# Patient Record
Sex: FEMALE | Race: WHITE | NOT HISPANIC OR LATINO | Employment: OTHER | URBAN - METROPOLITAN AREA
[De-identification: names, ages, dates, MRNs, and addresses within clinical notes are randomized per-mention and may not be internally consistent; named-entity substitution may affect disease eponyms.]

---

## 2017-01-06 DIAGNOSIS — G56.03 CARPAL TUNNEL SYNDROME ON BOTH SIDES: ICD-10-CM

## 2017-01-08 RX ORDER — GABAPENTIN 300 MG/1
CAPSULE ORAL
Qty: 30 CAPSULE | Refills: 0 | OUTPATIENT
Start: 2017-01-08

## 2017-01-13 RX ORDER — FLUTICASONE PROPIONATE 50 MCG
SPRAY, SUSPENSION (ML) NASAL
Qty: 16 G | Refills: 12 | Status: SHIPPED | OUTPATIENT
Start: 2017-01-13 | End: 2018-05-08

## 2017-01-28 DIAGNOSIS — G56.03 CARPAL TUNNEL SYNDROME ON BOTH SIDES: ICD-10-CM

## 2017-01-28 RX ORDER — GABAPENTIN 300 MG/1
CAPSULE ORAL
Qty: 90 CAPSULE | Refills: 0 | Status: SHIPPED | OUTPATIENT
Start: 2017-01-28 | End: 2017-02-16 | Stop reason: SDUPTHER

## 2017-02-14 ENCOUNTER — OFFICE VISIT (OUTPATIENT)
Dept: FAMILY MEDICINE | Facility: CLINIC | Age: 32
End: 2017-02-14
Payer: COMMERCIAL

## 2017-02-14 VITALS
WEIGHT: 161.19 LBS | SYSTOLIC BLOOD PRESSURE: 110 MMHG | TEMPERATURE: 99 F | DIASTOLIC BLOOD PRESSURE: 86 MMHG | HEIGHT: 60 IN | BODY MASS INDEX: 31.64 KG/M2 | HEART RATE: 108 BPM

## 2017-02-14 DIAGNOSIS — G93.5 CHIARI MALFORMATION TYPE I: ICD-10-CM

## 2017-02-14 DIAGNOSIS — F98.8 ADD (ATTENTION DEFICIT DISORDER): Primary | ICD-10-CM

## 2017-02-14 DIAGNOSIS — G95.0 SYRINGOMYELIA: ICD-10-CM

## 2017-02-14 DIAGNOSIS — F34.1 DYSTHYMIC DISORDER: ICD-10-CM

## 2017-02-14 PROCEDURE — 99999 PR PBB SHADOW E&M-EST. PATIENT-LVL III: CPT | Mod: PBBFAC,,, | Performed by: FAMILY MEDICINE

## 2017-02-14 PROCEDURE — 99213 OFFICE O/P EST LOW 20 MIN: CPT | Mod: S$GLB,,, | Performed by: FAMILY MEDICINE

## 2017-02-14 RX ORDER — DEXTROAMPHETAMINE SACCHARATE, AMPHETAMINE ASPARTATE MONOHYDRATE, DEXTROAMPHETAMINE SULFATE AND AMPHETAMINE SULFATE 5; 5; 5; 5 MG/1; MG/1; MG/1; MG/1
20 CAPSULE, EXTENDED RELEASE ORAL EVERY MORNING
Qty: 30 CAPSULE | Refills: 0 | Status: SHIPPED | OUTPATIENT
Start: 2017-02-14 | End: 2017-05-16 | Stop reason: SDUPTHER

## 2017-02-14 RX ORDER — DEXTROAMPHETAMINE SACCHARATE, AMPHETAMINE ASPARTATE MONOHYDRATE, DEXTROAMPHETAMINE SULFATE AND AMPHETAMINE SULFATE 5; 5; 5; 5 MG/1; MG/1; MG/1; MG/1
20 CAPSULE, EXTENDED RELEASE ORAL EVERY MORNING
Qty: 30 CAPSULE | Refills: 0 | Status: SHIPPED | OUTPATIENT
Start: 2017-03-17 | End: 2017-05-16 | Stop reason: SDUPTHER

## 2017-02-14 RX ORDER — DEXTROAMPHETAMINE SACCHARATE, AMPHETAMINE ASPARTATE MONOHYDRATE, DEXTROAMPHETAMINE SULFATE AND AMPHETAMINE SULFATE 5; 5; 5; 5 MG/1; MG/1; MG/1; MG/1
20 CAPSULE, EXTENDED RELEASE ORAL EVERY MORNING
Qty: 30 CAPSULE | Refills: 0 | Status: SHIPPED | OUTPATIENT
Start: 2017-04-16 | End: 2017-05-16 | Stop reason: SDUPTHER

## 2017-02-14 NOTE — PATIENT INSTRUCTIONS
Follow with neurologist          ===========================================  In 3 months, you can EMAIL me for 3 more prescriptions.     In 6 months, feel free to see my NURSE PRACTITIONER NEGRA LOFTON for refills     Since your medication is a  controlled substance, we have to legally see patients FACE TO FACE EVERY 6 MONTHS. This is the same with any other controlled substance that we prescribe. It is for the safety of our patients and the safety of my medical license.    Please let me know if you have any side effects - including palpitations, weight loss, chest pain, elevated blood pressure. Please do not increase the dosage without discussing it with me first.   If you are female & are contemplating pregnancy or become pregnant, stop this medication. It can affect a fetus.      Of course if you are seen for another ailment in the meantime, we can discuss your medicine at that visit, which will suffice.     Please contact me if you have any other questions. Feel free to make your appointments online.  ============================================

## 2017-02-14 NOTE — PROGRESS NOTES
Subjective:      Patient ID: Can Wright is a 31 y.o. female.    Chief Complaint: Medication Management (adderall)    Presents today to discuss ADD medications. Adderall XR 20 mg daily, she was able to stop taking Lunesta    It works well for focus, concentration, completing tasks.    She follows with psychiatrist for anxiety . They prescribe lexapro 20 daily. Takes nortriptyline for insomnia. She takes clorazepate prn severe anxiety    Denies any side effects of palpitations, chest pain, shortness of breath, weight loss, decreased appetite, sweating    She states she had a recent MRI brain with her neurologist for syringomeylia & chaiari I malformation. No recnet headachse.     Current Outpatient Prescriptions on File Prior to Visit   Medication Sig    celecoxib (CELEBREX) 200 MG capsule Take 1 capsule (200 mg total) by mouth once daily. With zantac to prevent stomach ulcer, stop if stomach upset    clorazepate (TRANXENE) 7.5 MG Tab Take 7.5 mg by mouth every 8 (eight) hours as needed.    escitalopram oxalate (LEXAPRO) 20 MG tablet TAKE 1 TABLET (20 MG TOTAL) BY MOUTH ONCE DAILY.    fluticasone (FLONASE) 50 mcg/actuation nasal spray USE 2 SPRAYS IN EACH NOSTRIL DAILY    gabapentin (NEURONTIN) 300 MG capsule TAKE 1 CAPSULE (300 MG TOTAL) BY MOUTH 3 TIMES DAILY.    MICROGESTIN 1/20, 21, 1-20 mg-mcg per tablet     nortriptyline (PAMELOR) 50 MG capsule TAKE 1 CAPSULE (50 MG TOTAL) BY MOUTH EVERY EVENING.     dextroamphetamine-amphetamine (ADDERALL XR) 20 MG 24 hr capsule Take 1 capsule (20 mg total) by mouth every morning.     Past Medical History   Diagnosis Date    ADD (attention deficit disorder)     Allergy     Carpal tunnel syndrome on both sides 2012     celebrex qd with zantac, Bilateral injections Dr Talbert, [place] expensive    Chiari I malformation     Dysthymic disorder      7/2015 - she will see psychiatrist for med mgmt    Hyperlipidemia     Syringomyelia      tramadol prn,   [place], steroids worked initially, tx Celebrex, Neurontin, Tyl #3 (helps prn), Dr Samia braswell recommended surgery     No past surgical history on file.  Social History     Social History Narrative    , lives with mom - who has Fibromyalgia & macular degeneration, (dad passed young) single, no children, nosmoker ETOH 1-2 x / mo     Family History   Problem Relation Age of Onset    Hyperlipidemia Mother     Fibromyalgia Mother     Arthritis Mother     Depression Father     Cancer Maternal Uncle      liver    Arthritis Maternal Grandmother     Heart disease Maternal Grandfather      Vitals:    02/14/17 1044   BP: 110/86   Pulse: 108   Temp: 99.2 °F (37.3 °C)   Weight: 73.1 kg (161 lb 2.5 oz)   Height: 5' (1.524 m)   PainSc: 0-No pain     Objective:   Physical Exam   Cardiovascular: Normal rate, regular rhythm and normal heart sounds.    Pulmonary/Chest: Effort normal and breath sounds normal. No respiratory distress.   Psychiatric: She has a normal mood and affect. Her behavior is normal. Judgment and thought content normal.     Assessment:     1. ADD (attention deficit disorder)    2. Dysthymic disorder    3. Chiari malformation type I    4. Syringomyelia      Plan:     Orders Placed This Encounter    dextroamphetamine-amphetamine (ADDERALL XR) 20 MG 24 hr capsule    dextroamphetamine-amphetamine (ADDERALL XR) 20 MG 24 hr capsule    dextroamphetamine-amphetamine (ADDERALL XR) 20 MG 24 hr capsule       Patient Instructions   Follow with neurologist & psychiatrist for  pamelor & tranzene    ===========================================  In 3 months, you can EMAIL me for 3 more prescriptions.     In 6 months, feel free to see my NURSE PRACTITIONER NEGRA LOFTON for refills     Since your medication is a  controlled substance, we have to legally see patients FACE TO FACE EVERY 6 MONTHS. This is the same with any other controlled substance that we prescribe. It is for the safety of our patients and  the safety of my medical license.    Please let me know if you have any side effects - including palpitations, weight loss, chest pain, elevated blood pressure. Please do not increase the dosage without discussing it with me first.   If you are female & are contemplating pregnancy or become pregnant, stop this medication. It can affect a fetus.      Of course if you are seen for another ailment in the meantime, we can discuss your medicine at that visit, which will suffice.     Please contact me if you have any other questions. Feel free to make your appointments online.  ============================================

## 2017-02-14 NOTE — MR AVS SNAPSHOT
[facility]  [address] [facility], [address]  [facility] [address]  Phone: [phone]  Fax: [phone]                  Can Wright   2017 10:40 AM   Office Visit    Description:  Female : 1985   Provider:  Bertha Witt MD   Department:  [facility]           Reason for Visit     Medication Management           Diagnoses this Visit        Comments    ADD (attention deficit disorder)    -  Primary     Dysthymic disorder         Chiari malformation type I         Syringomyelia                To Do List           Goals (5 Years of Data)     None       These Medications        Disp Refills Start End    dextroamphetamine-amphetamine (ADDERALL XR) 20 MG 24 hr capsule 30 capsule 0 2017 3/16/2017    Take 1 capsule (20 mg total) by mouth every morning. - Oral    Pharmacy: [facility]/pharmacy #[id number] - [place] - [address] Ph #: [phone]       dextroamphetamine-amphetamine (ADDERALL XR) 20 MG 24 hr capsule 30 capsule 0 3/17/2017 4/15/2017    Take 1 capsule (20 mg total) by mouth every morning. - Oral    Pharmacy: [facility]/pharmacy #[id number] - [place] - [address] Ph #: [phone]       dextroamphetamine-amphetamine (ADDERALL XR) 20 MG 24 hr capsule 30 capsule 0 2017 5/15/2017    Take 1 capsule (20 mg total) by mouth every morning. - Oral    Pharmacy: [facility]/pharmacy #[id number] - [place] - [address] Ph #: [phone]         [facility]     [facility] -  Assistance  Registered nurses in the [facility] provide clinical advisement, health education, appointment booking, and other advisory services.  Call for this free service at [phone].             Medications           Message regarding Medications     Verify the changes and/or additions to your medication regime listed below are the same as discussed with your clinician today.  If any of these changes or additions are  incorrect, please notify your healthcare provider.        START taking these NEW medications        Refills    dextroamphetamine-amphetamine (ADDERALL XR) 20 MG 24 hr capsule 0    Sig: Take 1 capsule (20 mg total) by mouth every morning.    Class: Print    Route: Oral    dextroamphetamine-amphetamine (ADDERALL XR) 20 MG 24 hr capsule 0    Starting on: 3/17/2017    Sig: Take 1 capsule (20 mg total) by mouth every morning.    Class: Print    Route: Oral    dextroamphetamine-amphetamine (ADDERALL XR) 20 MG 24 hr capsule 0    Starting on: 4/16/2017    Sig: Take 1 capsule (20 mg total) by mouth every morning.    Class: Print    Route: Oral      STOP taking these medications     eszopiclone 3 mg Tab Take 3 mg by mouth every evening.    dextroamphetamine-amphetamine (ADDERALL XR) 30 MG 24 hr capsule Take 1 capsule (30 mg total) by mouth every morning.    dextroamphetamine-amphetamine (ADDERALL XR) 30 MG 24 hr capsule Take 1 capsule (30 mg total) by mouth every morning.    dextroamphetamine-amphetamine (ADDERALL XR) 30 MG 24 hr capsule Take 1 capsule (30 mg total) by mouth every morning.    dextroamphetamine-amphetamine (ADDERALL XR) 30 MG 24 hr capsule Take 1 capsule (30 mg total) by mouth every morning.    dextroamphetamine-amphetamine (ADDERALL XR) 30 MG 24 hr capsule Take 1 capsule (30 mg total) by mouth every morning.    dextroamphetamine-amphetamine (ADDERALL XR) 30 MG 24 hr capsule Take 1 capsule (30 mg total) by mouth every morning.    dextroamphetamine-amphetamine (ADDERALL XR) 30 MG 24 hr capsule Take 1 capsule (30 mg total) by mouth every morning.    dextroamphetamine-amphetamine (ADDERALL XR) 30 MG 24 hr capsule Take 1 capsule (30 mg total) by mouth every morning.    dextroamphetamine-amphetamine (ADDERALL XR) 30 MG 24 hr capsule Take 1 capsule (30 mg total) by mouth every morning.    dextroamphetamine-amphetamine (ADDERALL XR) 30 MG 24 hr capsule Take 1 capsule (30 mg total) by mouth every morning.     dextroamphetamine-amphetamine (ADDERALL XR) 30 MG 24 hr capsule Take 1 capsule (30 mg total) by mouth every morning.    dextroamphetamine-amphetamine (ADDERALL XR) 30 MG 24 hr capsule Take 1 capsule (30 mg total) by mouth every morning.    dextroamphetamine-amphetamine (ADDERALL XR) 30 MG 24 hr capsule Take 1 capsule (30 mg total) by mouth every morning.           Verify that the below list of medications is an accurate representation of the medications you are currently taking.  If none reported, the list may be blank. If incorrect, please contact your healthcare provider. Carry this list with you in case of emergency.           Current Medications     celecoxib (CELEBREX) 200 MG capsule Take 1 capsule (200 mg total) by mouth once daily. With zantac to prevent stomach ulcer, stop if stomach upset    clorazepate (TRANXENE) 7.5 MG Tab Take 7.5 mg by mouth every 8 (eight) hours as needed.    escitalopram oxalate (LEXAPRO) 20 MG tablet TAKE 1 TABLET (20 MG TOTAL) BY MOUTH ONCE DAILY.    fluticasone (FLONASE) 50 mcg/actuation nasal spray USE 2 SPRAYS IN EACH NOSTRIL DAILY    gabapentin (NEURONTIN) 300 MG capsule TAKE 1 CAPSULE (300 MG TOTAL) BY MOUTH 3 TIMES DAILY.    MICROGESTIN 1/20, 21, 1-20 mg-mcg per tablet     nortriptyline (PAMELOR) 50 MG capsule TAKE 1 CAPSULE (50 MG TOTAL) BY MOUTH EVERY EVENING.    dextroamphetamine-amphetamine (ADDERALL XR) 20 MG 24 hr capsule Take 1 capsule (20 mg total) by mouth every morning.    dextroamphetamine-amphetamine (ADDERALL XR) 20 MG 24 hr capsule Starting on Mar 17, 2017. Take 1 capsule (20 mg total) by mouth every morning.    dextroamphetamine-amphetamine (ADDERALL XR) 20 MG 24 hr capsule Starting on Apr 16, 2017. Take 1 capsule (20 mg total) by mouth every morning.           Clinical Reference Information           Your Vitals Were     BP Pulse Temp Height Weight Last Period    110/86 (BP Location: Left arm) 108 99.2 °F (37.3 °C) 5' (1.524 m) 73.1 kg (161 lb 2.5 oz)  01/31/2017 (Exact Date)    BMI                31.47 kg/m2          Blood Pressure          Most Recent Value    BP  110/86      Allergies as of 2/14/2017     Wellbutrin [Bupropion Hcl]    Augmentin [Amoxicillin-pot Clavulanate]      Immunizations Administered on Date of Encounter - 2/14/2017     None      Instructions    Follow with neurologist          ===========================================  In 3 months, you can EMAIL me for 3 more prescriptions.     In 6 months, feel free to see my NURSE PRACTITIONER NEGRA LOFTON for refills     Since your medication is a  controlled substance, we have to legally see patients FACE TO FACE EVERY 6 MONTHS. This is the same with any other controlled substance that we prescribe. It is for the safety of our patients and the safety of my medical license.    Please let me know if you have any side effects - including palpitations, weight loss, chest pain, elevated blood pressure. Please do not increase the dosage without discussing it with me first.   If you are female & are contemplating pregnancy or become pregnant, stop this medication. It can affect a fetus.      Of course if you are seen for another ailment in the meantime, we can discuss your medicine at that visit, which will suffice.     Please contact me if you have any other questions. Feel free to make your appointments online.  ============================================         Language Assistance Services     ATTENTION: Language assistance services are available, free of charge. Please call [phone].      ATENCIÓN: Si habla español, tiene a bunch disposición servicios gratuitos de asistencia lingüística. Llame al [phone].     ISAI Ý: N?u b?n nói Ti?ng Vi?t, có các d?ch v? h? tr? ngôn ng? mi?n phí dành cho b?n. G?i s? [phone].         [facility] complies with applicable Federal civil rights laws and does not discriminate on the basis of race, color, national origin, age, disability,  or sex.

## 2017-02-16 DIAGNOSIS — G56.03 CARPAL TUNNEL SYNDROME ON BOTH SIDES: ICD-10-CM

## 2017-02-17 RX ORDER — GABAPENTIN 300 MG/1
CAPSULE ORAL
Qty: 90 CAPSULE | Refills: 0 | Status: SHIPPED | OUTPATIENT
Start: 2017-02-17 | End: 2017-03-11 | Stop reason: SDUPTHER

## 2017-03-11 DIAGNOSIS — G56.03 CARPAL TUNNEL SYNDROME ON BOTH SIDES: ICD-10-CM

## 2017-03-13 RX ORDER — GABAPENTIN 300 MG/1
CAPSULE ORAL
Qty: 90 CAPSULE | Refills: 6 | Status: SHIPPED | OUTPATIENT
Start: 2017-03-13 | End: 2017-08-01 | Stop reason: SDUPTHER

## 2017-03-13 RX ORDER — NORTRIPTYLINE HYDROCHLORIDE 50 MG/1
CAPSULE ORAL
Qty: 30 CAPSULE | Refills: 1 | Status: SHIPPED | OUTPATIENT
Start: 2017-03-13 | End: 2017-04-26 | Stop reason: SDUPTHER

## 2017-03-13 RX ORDER — CELECOXIB 200 MG/1
CAPSULE ORAL
Qty: 30 CAPSULE | Refills: 9 | Status: SHIPPED | OUTPATIENT
Start: 2017-03-13 | End: 2019-12-30

## 2017-04-27 RX ORDER — NORTRIPTYLINE HYDROCHLORIDE 50 MG/1
CAPSULE ORAL
Qty: 30 CAPSULE | Refills: 12 | Status: SHIPPED | OUTPATIENT
Start: 2017-04-27 | End: 2018-01-23

## 2017-05-16 NOTE — TELEPHONE ENCOUNTER
----- Message from Swati Azevedo sent at 2017 12:36 PM CDT -----  Contact: call  [phone]  RX request - refill or new RX.  Is this a refill or new RX:  refill  RX name and strength: dextroamphetamine-amphetamine (ADDERALL XR) 20 MG 24 hr capsule ()  Directions:   Is this a 30 day or 90 day RX: 3 single month rx's    Pharmacy name and phone #: [facility]/pharmacy #[id number] - [place] - [address] [phone] (Phone)   Comments:  Please let patient if she has to  or if she can  at pharmacy

## 2017-05-17 RX ORDER — DEXTROAMPHETAMINE SACCHARATE, AMPHETAMINE ASPARTATE MONOHYDRATE, DEXTROAMPHETAMINE SULFATE AND AMPHETAMINE SULFATE 5; 5; 5; 5 MG/1; MG/1; MG/1; MG/1
20 CAPSULE, EXTENDED RELEASE ORAL EVERY MORNING
Qty: 30 CAPSULE | Refills: 0 | Status: SHIPPED | OUTPATIENT
Start: 2017-06-16 | End: 2017-07-15

## 2017-05-17 RX ORDER — DEXTROAMPHETAMINE SACCHARATE, AMPHETAMINE ASPARTATE MONOHYDRATE, DEXTROAMPHETAMINE SULFATE AND AMPHETAMINE SULFATE 5; 5; 5; 5 MG/1; MG/1; MG/1; MG/1
20 CAPSULE, EXTENDED RELEASE ORAL EVERY MORNING
Qty: 30 CAPSULE | Refills: 0 | Status: SHIPPED | OUTPATIENT
Start: 2017-05-17 | End: 2017-06-15

## 2017-05-17 RX ORDER — DEXTROAMPHETAMINE SACCHARATE, AMPHETAMINE ASPARTATE MONOHYDRATE, DEXTROAMPHETAMINE SULFATE AND AMPHETAMINE SULFATE 5; 5; 5; 5 MG/1; MG/1; MG/1; MG/1
20 CAPSULE, EXTENDED RELEASE ORAL EVERY MORNING
Qty: 30 CAPSULE | Refills: 0 | Status: SHIPPED | OUTPATIENT
Start: 2017-07-16 | End: 2018-05-08

## 2017-05-17 NOTE — TELEPHONE ENCOUNTER
Voice mail msg/pt May, June & July refills ready for .  Due next 6 month f/u appt August.  We recommend that pt schedule her August appt when picking up today's refills.    Refills placed at  for .

## 2017-06-20 RX ORDER — ESCITALOPRAM OXALATE 20 MG/1
TABLET ORAL
Qty: 30 TABLET | Refills: 11 | Status: SHIPPED | OUTPATIENT
Start: 2017-06-20 | End: 2018-06-29 | Stop reason: SDUPTHER

## 2017-08-01 DIAGNOSIS — G56.03 CARPAL TUNNEL SYNDROME ON BOTH SIDES: ICD-10-CM

## 2017-08-01 RX ORDER — GABAPENTIN 300 MG/1
CAPSULE ORAL
Qty: 90 CAPSULE | Refills: 6 | Status: SHIPPED | OUTPATIENT
Start: 2017-08-01 | End: 2018-01-19 | Stop reason: SDUPTHER

## 2018-01-19 DIAGNOSIS — G56.03 CARPAL TUNNEL SYNDROME ON BOTH SIDES: ICD-10-CM

## 2018-01-23 RX ORDER — GABAPENTIN 300 MG/1
CAPSULE ORAL
Qty: 90 CAPSULE | Refills: 0 | Status: SHIPPED | OUTPATIENT
Start: 2018-01-23 | End: 2018-02-15 | Stop reason: SDUPTHER

## 2018-01-23 RX ORDER — NORTRIPTYLINE HYDROCHLORIDE 50 MG/1
CAPSULE ORAL
Qty: 30 CAPSULE | Refills: 0 | Status: SHIPPED | OUTPATIENT
Start: 2018-01-23 | End: 2018-05-11 | Stop reason: SDUPTHER

## 2018-02-14 DIAGNOSIS — G56.03 CARPAL TUNNEL SYNDROME ON BOTH SIDES: ICD-10-CM

## 2018-02-14 RX ORDER — GABAPENTIN 300 MG/1
CAPSULE ORAL
Qty: 90 CAPSULE | Refills: 0 | OUTPATIENT
Start: 2018-02-14

## 2018-02-14 RX ORDER — NORTRIPTYLINE HYDROCHLORIDE 50 MG/1
CAPSULE ORAL
Qty: 30 CAPSULE | Refills: 0 | OUTPATIENT
Start: 2018-02-14

## 2018-02-15 DIAGNOSIS — G56.03 CARPAL TUNNEL SYNDROME ON BOTH SIDES: ICD-10-CM

## 2018-02-15 RX ORDER — GABAPENTIN 300 MG/1
CAPSULE ORAL
Qty: 90 CAPSULE | Refills: 0 | Status: SHIPPED | OUTPATIENT
Start: 2018-02-15 | End: 2018-03-11 | Stop reason: SDUPTHER

## 2018-02-15 RX ORDER — NORTRIPTYLINE HYDROCHLORIDE 50 MG/1
CAPSULE ORAL
Qty: 30 CAPSULE | Refills: 0 | OUTPATIENT
Start: 2018-02-15

## 2018-02-15 NOTE — TELEPHONE ENCOUNTER
She was initially prescribed Nortriptyline from her psychiatrist for insomnia. With the heart risks, I can evaluate her at her appt whether to continue this    Last seen by me Feb 2017. Needs EPI or PHYS with fasting labs prior. I can refill gabapentin until her appt

## 2018-02-16 NOTE — TELEPHONE ENCOUNTER
Dr. Witt's msg left as voice mail msg for pt.  Call to schedule annual exam and fasting labs (earliest available mid April).  Also, earlier EP appt if pt would like appt to discuss Nortriptyline refill now.

## 2018-03-11 DIAGNOSIS — G56.03 CARPAL TUNNEL SYNDROME ON BOTH SIDES: ICD-10-CM

## 2018-03-12 RX ORDER — GABAPENTIN 300 MG/1
CAPSULE ORAL
Qty: 90 CAPSULE | Refills: 0 | Status: SHIPPED | OUTPATIENT
Start: 2018-03-12 | End: 2018-04-04 | Stop reason: SDUPTHER

## 2018-04-04 DIAGNOSIS — G56.03 CARPAL TUNNEL SYNDROME ON BOTH SIDES: ICD-10-CM

## 2018-04-04 RX ORDER — GABAPENTIN 300 MG/1
CAPSULE ORAL
Qty: 90 CAPSULE | Refills: 0 | Status: SHIPPED | OUTPATIENT
Start: 2018-04-04 | End: 2018-05-01 | Stop reason: SDUPTHER

## 2018-04-05 DIAGNOSIS — Z00.00 ANNUAL PHYSICAL EXAM: Primary | ICD-10-CM

## 2018-04-10 ENCOUNTER — TELEPHONE (OUTPATIENT)
Dept: FAMILY MEDICINE | Facility: CLINIC | Age: 33
End: 2018-04-10

## 2018-04-10 DIAGNOSIS — Z00.00 ROUTINE GENERAL MEDICAL EXAMINATION AT A HEALTH CARE FACILITY: Primary | ICD-10-CM

## 2018-04-10 NOTE — TELEPHONE ENCOUNTER
----- Message from Swati Azevedo sent at 4/10/2018 11:05 AM CDT -----  Doctor appointment and lab have been scheduled.  Please link lab orders to the lab appointment.  Date of doctor appointment:  4/19/18  Physical or EP:  epp  Date of lab appointment:  04/16/18  Comments:

## 2018-04-28 DIAGNOSIS — G56.03 CARPAL TUNNEL SYNDROME ON BOTH SIDES: ICD-10-CM

## 2018-04-30 RX ORDER — GABAPENTIN 300 MG/1
CAPSULE ORAL
Qty: 90 CAPSULE | Refills: 0 | OUTPATIENT
Start: 2018-04-30

## 2018-04-30 NOTE — TELEPHONE ENCOUNTER
Our records indicate that you are overdue for your Annual Physical with Dr. Witt, along with fasting lab work prior. Please call our office at [phone] to schedule or you can schedule via [facility].     This appointment will need to be scheduled prior to further medication refills being sent.     If already scheduled, please disregard.

## 2018-05-01 DIAGNOSIS — G56.03 CARPAL TUNNEL SYNDROME ON BOTH SIDES: ICD-10-CM

## 2018-05-01 NOTE — TELEPHONE ENCOUNTER
Pt has upcoming annual exam 5/8/18 with fasting labs 5/4.    Orders entered and linked for routine labs.

## 2018-05-02 RX ORDER — GABAPENTIN 300 MG/1
CAPSULE ORAL
Qty: 90 CAPSULE | Refills: 0 | Status: SHIPPED | OUTPATIENT
Start: 2018-05-02 | End: 2018-05-08

## 2018-05-02 RX ORDER — GABAPENTIN 300 MG/1
300 CAPSULE ORAL 3 TIMES DAILY
Qty: 90 CAPSULE | Refills: 0 | Status: SHIPPED | OUTPATIENT
Start: 2018-05-02 | End: 2018-05-08 | Stop reason: SDUPTHER

## 2018-05-04 ENCOUNTER — LAB VISIT (OUTPATIENT)
Dept: LAB | Facility: HOSPITAL | Age: 33
End: 2018-05-04
Attending: FAMILY MEDICINE
Payer: COMMERCIAL

## 2018-05-04 DIAGNOSIS — Z00.00 ANNUAL PHYSICAL EXAM: ICD-10-CM

## 2018-05-04 LAB
ALBUMIN SERPL BCP-MCNC: 3.1 G/DL
ALP SERPL-CCNC: 68 U/L
ALT SERPL W/O P-5'-P-CCNC: 15 U/L
ANION GAP SERPL CALC-SCNC: 8 MMOL/L
AST SERPL-CCNC: 20 U/L
BASOPHILS # BLD AUTO: 0.05 K/UL
BASOPHILS NFR BLD: 0.5 %
BILIRUB SERPL-MCNC: 0.2 MG/DL
BUN SERPL-MCNC: 11 MG/DL
CALCIUM SERPL-MCNC: 9 MG/DL
CHLORIDE SERPL-SCNC: 106 MMOL/L
CHOLEST SERPL-MCNC: 210 MG/DL
CHOLEST/HDLC SERPL: 3.9 {RATIO}
CO2 SERPL-SCNC: 24 MMOL/L
CREAT SERPL-MCNC: 0.7 MG/DL
DIFFERENTIAL METHOD: ABNORMAL
EOSINOPHIL # BLD AUTO: 0.2 K/UL
EOSINOPHIL NFR BLD: 2.1 %
ERYTHROCYTE [DISTWIDTH] IN BLOOD BY AUTOMATED COUNT: 13.2 %
EST. GFR  (AFRICAN AMERICAN): >60 ML/MIN/1.73 M^2
EST. GFR  (NON AFRICAN AMERICAN): >60 ML/MIN/1.73 M^2
GLUCOSE SERPL-MCNC: 75 MG/DL
HCT VFR BLD AUTO: 39.7 %
HDLC SERPL-MCNC: 54 MG/DL
HDLC SERPL: 25.7 %
HGB BLD-MCNC: 12.6 G/DL
IMM GRANULOCYTES # BLD AUTO: 0.04 K/UL
IMM GRANULOCYTES NFR BLD AUTO: 0.4 %
LDLC SERPL CALC-MCNC: 127.6 MG/DL
LYMPHOCYTES # BLD AUTO: 4 K/UL
LYMPHOCYTES NFR BLD: 37.6 %
MCH RBC QN AUTO: 28.1 PG
MCHC RBC AUTO-ENTMCNC: 31.7 G/DL
MCV RBC AUTO: 89 FL
MONOCYTES # BLD AUTO: 0.5 K/UL
MONOCYTES NFR BLD: 4.9 %
NEUTROPHILS # BLD AUTO: 5.8 K/UL
NEUTROPHILS NFR BLD: 54.5 %
NONHDLC SERPL-MCNC: 156 MG/DL
NRBC BLD-RTO: 0 /100 WBC
PLATELET # BLD AUTO: 423 K/UL
PMV BLD AUTO: 10.2 FL
POTASSIUM SERPL-SCNC: 4.3 MMOL/L
PROT SERPL-MCNC: 6.8 G/DL
RBC # BLD AUTO: 4.48 M/UL
SODIUM SERPL-SCNC: 138 MMOL/L
TRIGL SERPL-MCNC: 142 MG/DL
WBC # BLD AUTO: 10.63 K/UL

## 2018-05-04 PROCEDURE — 80061 LIPID PANEL: CPT

## 2018-05-04 PROCEDURE — 85025 COMPLETE CBC W/AUTO DIFF WBC: CPT

## 2018-05-04 PROCEDURE — 80053 COMPREHEN METABOLIC PANEL: CPT

## 2018-05-04 PROCEDURE — 36415 COLL VENOUS BLD VENIPUNCTURE: CPT | Mod: PO

## 2018-05-08 ENCOUNTER — OFFICE VISIT (OUTPATIENT)
Dept: FAMILY MEDICINE | Facility: CLINIC | Age: 33
End: 2018-05-08
Payer: COMMERCIAL

## 2018-05-08 VITALS
WEIGHT: 177.5 LBS | HEIGHT: 61 IN | TEMPERATURE: 99 F | RESPIRATION RATE: 20 BRPM | BODY MASS INDEX: 33.51 KG/M2 | SYSTOLIC BLOOD PRESSURE: 110 MMHG | DIASTOLIC BLOOD PRESSURE: 80 MMHG

## 2018-05-08 DIAGNOSIS — G95.0 SYRINGOMYELIA: ICD-10-CM

## 2018-05-08 DIAGNOSIS — F34.1 DYSTHYMIC DISORDER: ICD-10-CM

## 2018-05-08 DIAGNOSIS — F90.0 ATTENTION DEFICIT HYPERACTIVITY DISORDER (ADHD), PREDOMINANTLY INATTENTIVE TYPE: ICD-10-CM

## 2018-05-08 DIAGNOSIS — G56.03 CARPAL TUNNEL SYNDROME ON BOTH SIDES: ICD-10-CM

## 2018-05-08 DIAGNOSIS — Z28.39 IMMUNIZATION DEFICIENCY: ICD-10-CM

## 2018-05-08 DIAGNOSIS — Z00.00 ROUTINE GENERAL MEDICAL EXAMINATION AT A HEALTH CARE FACILITY: Primary | ICD-10-CM

## 2018-05-08 DIAGNOSIS — G93.5 CHIARI MALFORMATION TYPE I: ICD-10-CM

## 2018-05-08 PROCEDURE — 90471 IMMUNIZATION ADMIN: CPT | Mod: S$GLB,,, | Performed by: FAMILY MEDICINE

## 2018-05-08 PROCEDURE — 99395 PREV VISIT EST AGE 18-39: CPT | Mod: 25,S$GLB,, | Performed by: FAMILY MEDICINE

## 2018-05-08 PROCEDURE — 99999 PR PBB SHADOW E&M-EST. PATIENT-LVL III: CPT | Mod: PBBFAC,,, | Performed by: FAMILY MEDICINE

## 2018-05-08 PROCEDURE — 90715 TDAP VACCINE 7 YRS/> IM: CPT | Mod: S$GLB,,, | Performed by: FAMILY MEDICINE

## 2018-05-08 RX ORDER — DEXTROAMPHETAMINE SACCHARATE, AMPHETAMINE ASPARTATE MONOHYDRATE, DEXTROAMPHETAMINE SULFATE AND AMPHETAMINE SULFATE 5; 5; 5; 5 MG/1; MG/1; MG/1; MG/1
20 CAPSULE, EXTENDED RELEASE ORAL EVERY MORNING
Qty: 30 CAPSULE | Refills: 0 | Status: SHIPPED | OUTPATIENT
Start: 2018-05-08 | End: 2018-08-13

## 2018-05-08 RX ORDER — DEXTROAMPHETAMINE SACCHARATE, AMPHETAMINE ASPARTATE MONOHYDRATE, DEXTROAMPHETAMINE SULFATE AND AMPHETAMINE SULFATE 5; 5; 5; 5 MG/1; MG/1; MG/1; MG/1
20 CAPSULE, EXTENDED RELEASE ORAL EVERY MORNING
Qty: 30 CAPSULE | Refills: 0 | Status: SHIPPED | OUTPATIENT
Start: 2018-07-08 | End: 2018-08-13 | Stop reason: SDUPTHER

## 2018-05-08 RX ORDER — ZOLPIDEM TARTRATE 5 MG/1
5 TABLET ORAL NIGHTLY PRN
Qty: 10 TABLET | Refills: 0 | Status: SHIPPED | OUTPATIENT
Start: 2018-05-08 | End: 2018-05-30 | Stop reason: SDUPTHER

## 2018-05-08 RX ORDER — ALPRAZOLAM 0.25 MG/1
TABLET ORAL
Qty: 10 TABLET | Refills: 0 | Status: SHIPPED | OUTPATIENT
Start: 2018-05-08 | End: 2018-06-26 | Stop reason: SDUPTHER

## 2018-05-08 RX ORDER — GABAPENTIN 300 MG/1
300 CAPSULE ORAL 3 TIMES DAILY
Qty: 90 CAPSULE | Refills: 0
Start: 2018-05-08 | End: 2019-01-30

## 2018-05-08 RX ORDER — DEXTROAMPHETAMINE SACCHARATE, AMPHETAMINE ASPARTATE MONOHYDRATE, DEXTROAMPHETAMINE SULFATE AND AMPHETAMINE SULFATE 5; 5; 5; 5 MG/1; MG/1; MG/1; MG/1
20 CAPSULE, EXTENDED RELEASE ORAL EVERY MORNING
Qty: 30 CAPSULE | Refills: 0 | Status: SHIPPED | OUTPATIENT
Start: 2018-06-08 | End: 2018-08-13 | Stop reason: SDUPTHER

## 2018-05-08 NOTE — PATIENT INSTRUCTIONS
Due for  Vaccines  - tetanus      Check out [email or web address]  - LISTEN TO HIS PODCAST ON EATING FOR ADHD    ===========================================  In 3 months, you can EMAIL me for 3 more prescriptions to send ELECTRONICALLY to your REGULAR PHARMACY    In 6 months, feel free to make an appointment with me  for refills    Since your medication is a  controlled substance, we have to legally see patients FACE TO FACE EVERY 6 MONTHS. This is the same with any other controlled substance that we prescribe. It is for the safety of our patients and the safety of my medical license.    Please let me know if you have any side effects - including palpitations, weight loss, chest pain, elevated blood pressure. Please do not increase the dosage without discussing it with me first.     If you are female & are contemplating pregnancy or become pregnant, STOP this medication. It can affect a fetus.     Of course if you are seen for another ailment in the meantime, we can discuss your medicine at that visit, which will suffice.     Please contact me if you have any other questions. Feel free to make your appointments online.  ============================================      Follow with GYN    RECOMMENDATIONS FOR FEMALES    Your #1 MEDICINE is DAILY EXERCISE - 15-20 minutes of huffing & puffing EVERY DAY.     Prevent the #1 cause of death- cardiovascular disease (HEART ATTACK & STROKE) by checking for normal blood pressure, cholesterol, sugars, & by not smoking.     VACCINES: Yearly FLU shot,     I recommend  high fiber (5 fresh fruits or vegetables daily), low fat diet and aerobic  exercise (huffing/ puffing/ sweating for 20 min straight at least 4 days a week)    Follow up yearly with mammogram (every 2 years) , fasting lipids, CMP, CBC, TSH prior.   ==============================================================

## 2018-05-08 NOTE — PROGRESS NOTES
Subjective:      Patient ID: Can Wright is a 32 y.o. female.    Chief Complaint: Annual Exam    Here today for general exam.     She is having congestion, post nasal drip, no cough, no fever.  Symptoms started a few days ago.  Using Flonase at night and DayQuil.     She is traveling to [place] with her boyfriend in the near future.  She is nervous about flying and jet lag, requesting medication    Presents today to discuss ADD medication  - adderall xr 20    It works well for focus, concentration, completing tasks.    Denies any side effects of palpitations, chest pain, shortness of breath, weight loss, decreased appetite, sweating      No results found for: HGBA1C  No results found for: MICALBCREAT  Lab Results   Component Value Date    LDLCALC 127.6 05/04/2018    LDLCALC 134.0 05/23/2012    CHOL 210 (H) 05/04/2018    HDL 54 05/04/2018    TRIG 142 05/04/2018       Lab Results   Component Value Date     05/04/2018    K 4.3 05/04/2018     05/04/2018    CO2 24 05/04/2018    GLU 75 05/04/2018    BUN 11 05/04/2018    CREATININE 0.7 05/04/2018    CALCIUM 9.0 05/04/2018    PROT 6.8 05/04/2018    ALBUMIN 3.1 (L) 05/04/2018    BILITOT 0.2 05/04/2018    ALKPHOS 68 05/04/2018    AST 20 05/04/2018    ALT 15 05/04/2018    ANIONGAP 8 05/04/2018    ESTGFRAFRICA >60.0 05/04/2018    EGFRNONAA >60.0 05/04/2018    WBC 10.63 05/04/2018    HGB 12.6 05/04/2018    HCT 39.7 05/04/2018    MCV 89 05/04/2018     (H) 05/04/2018    TSH 1.35 08/10/2011         Current Outpatient Prescriptions on File Prior to Visit   Medication Sig    celecoxib (CELEBREX) 200 MG capsule TAKE 1 CAPSULE ORALLY ONCE DAILY WITH ZANTAC TO PREVENT STOMACH ULCER, STOP IF STOMACH UPSET    escitalopram oxalate (LEXAPRO) 20 MG tablet TAKE 1 TABLET (20 MG TOTAL) BY MOUTH ONCE DAILY.    gabapentin (NEURONTIN) 300 MG capsule TAKE 1 CAPSULE (300 MG TOTAL) BY MOUTH 3 TIMES DAILY.    MICROGESTIN 1/20, 21, 1-20 mg-mcg per tablet      "nortriptyline (PAMELOR) 50 MG capsule TAKE 1 CAPSULE BY MOUTH EVERY EVENING.     Past Medical History:   Diagnosis Date    ADD (attention deficit disorder)     Allergy     Carpal tunnel syndrome on both sides 2012    celebrex qd with zantac, Bilateral injections Dr Talbert, Celebrex expensive    Chiari I malformation     Dysthymic disorder     lexapro    Hyperlipidemia     Syringomyelia     tramadol prn, Dr Lyon, steroids worked initially, tx Celebrex, Neurontin, Tyl #3 (helps prn), pamarpita, Dr Gracia recommended surgery     No past surgical history on file.  Social History     Social History Narrative    , lives with mom - who has Fibromyalgia & macular degeneration, (dad passed young) single, no children, nosmoker ETOH 1-2 x / mo     Family History   Problem Relation Age of Onset    Hyperlipidemia Mother     Fibromyalgia Mother     Arthritis Mother     Depression Father     Cancer Maternal Uncle         liver    Arthritis Maternal Grandmother     Heart disease Maternal Grandfather      Vitals:    05/08/18 1422 05/08/18 1456   BP: (!) 124/102 110/80   Resp: 20    Temp: 98.9 °F (37.2 °C)    Weight: 80.5 kg (177 lb 7.5 oz)    Height: 5' 1" (1.549 m)    PainSc:   2      Objective:   Physical Exam   Constitutional: She is oriented to person, place, and time. She appears well-developed and well-nourished. No distress.   HENT:   Head: Normocephalic and atraumatic.   Right Ear: Tympanic membrane and external ear normal.   Left Ear: Tympanic membrane and external ear normal.   Nose: Nose normal. Right sinus exhibits no maxillary sinus tenderness and no frontal sinus tenderness. Left sinus exhibits no maxillary sinus tenderness and no frontal sinus tenderness.   Mouth/Throat: Oropharynx is clear and moist and mucous membranes are normal. No oropharyngeal exudate.   Eyes: EOM are normal. Pupils are equal, round, and reactive to light.   Neck: Normal range of motion. Neck supple. No thyromegaly " present.   Cardiovascular: Normal rate, regular rhythm, normal heart sounds and intact distal pulses.    No murmur heard.  Pulmonary/Chest: Effort normal and breath sounds normal. No respiratory distress. She has no wheezes. She has no rales.   Abdominal: Soft. Bowel sounds are normal. She exhibits no distension and no mass. There is no tenderness. There is no rebound and no guarding.   Musculoskeletal: She exhibits no edema.   Lymphadenopathy:     She has no cervical adenopathy.   Neurological: She is alert and oriented to person, place, and time.   Skin: Skin is warm and dry.   Psychiatric: She has a normal mood and affect. Her behavior is normal. Judgment and thought content normal.   Nursing note and vitals reviewed.    Assessment:     1. Routine general medical examination at a health care facility    2. Attention deficit hyperactivity disorder (ADHD), predominantly inattentive type    3. Syringomyelia    4. Chiari malformation type I    5. Dysthymic disorder    6. Carpal tunnel syndrome on both sides    7. Immunization deficiency      Plan:     Orders Placed This Encounter    (In Office Administered) Tdap Vaccine    gabapentin (NEURONTIN) 300 MG capsule    dextroamphetamine-amphetamine (ADDERALL XR) 20 MG 24 hr capsule    dextroamphetamine-amphetamine (ADDERALL XR) 20 MG 24 hr capsule    dextroamphetamine-amphetamine (ADDERALL XR) 20 MG 24 hr capsule    ALPRAZolam (XANAX) 0.25 MG tablet    zolpidem (AMBIEN) 5 MG Tab     flonase twice a day    Travelling to [place] with her boyfriend & requesting medication for flight anxiety    Patient Instructions   Due for  Vaccines  - tetanus      Check out [email or web address]  - LISTEN TO HIS PODCAST ON EATING FOR ADHD    ===========================================  In 3 months, you can EMAIL me for 3 more prescriptions to send ELECTRONICALLY to your REGULAR PHARMACY    In 6 months, feel free to make an appointment with me  for refills    Since your medication is a   controlled substance, we have to legally see patients FACE TO FACE EVERY 6 MONTHS. This is the same with any other controlled substance that we prescribe. It is for the safety of our patients and the safety of my medical license.    Please let me know if you have any side effects - including palpitations, weight loss, chest pain, elevated blood pressure. Please do not increase the dosage without discussing it with me first.     If you are female & are contemplating pregnancy or become pregnant, STOP this medication. It can affect a fetus.     Of course if you are seen for another ailment in the meantime, we can discuss your medicine at that visit, which will suffice.     Please contact me if you have any other questions. Feel free to make your appointments online.  ============================================      Follow with GYN    RECOMMENDATIONS FOR FEMALES    Your #1 MEDICINE is DAILY EXERCISE - 15-20 minutes of huffing & puffing EVERY DAY.     Prevent the #1 cause of death- cardiovascular disease (HEART ATTACK & STROKE) by checking for normal blood pressure, cholesterol, sugars, & by not smoking.     VACCINES: Yearly FLU shot,     I recommend  high fiber (5 fresh fruits or vegetables daily), low fat diet and aerobic  exercise (huffing/ puffing/ sweating for 20 min straight at least 4 days a week)    Follow up yearly with mammogram (every 2 years) , fasting lipids, CMP, CBC, TSH prior.   ==============================================================                                  Answers for HPI/ROS submitted by the patient on 5/7/2018   activity change: No  unexpected weight change: No  neck pain: No  hearing loss: No  rhinorrhea: No  trouble swallowing: No  eye discharge: No  visual disturbance: No  chest tightness: No  wheezing: No  chest pain: No  palpitations: No  blood in stool: No  constipation: No  vomiting: No  diarrhea: No  polydipsia: No  polyuria: No  difficulty urinating: No  hematuria:  No  menstrual problem: No  dysuria: No  joint swelling: No  arthralgias: No  headaches: No  weakness: No  confusion: No  dysphoric mood: No

## 2018-05-08 NOTE — PROGRESS NOTES
Two patient identifiers used, allergies reviewed, vaccine confirmed.  Tdap vaccine administered IM left deltoid.  Pt tolerated well, no redness or bruising at injection site.  Pt advised to remain in clinic 15 mins following injection for observation.

## 2018-05-11 RX ORDER — NORTRIPTYLINE HYDROCHLORIDE 50 MG/1
CAPSULE ORAL
Qty: 90 CAPSULE | Refills: 1 | Status: SHIPPED | OUTPATIENT
Start: 2018-05-11 | End: 2018-10-02 | Stop reason: SDUPTHER

## 2018-05-25 DIAGNOSIS — G56.03 CARPAL TUNNEL SYNDROME ON BOTH SIDES: ICD-10-CM

## 2018-05-25 RX ORDER — GABAPENTIN 300 MG/1
CAPSULE ORAL
Qty: 90 CAPSULE | Refills: 11 | Status: SHIPPED | OUTPATIENT
Start: 2018-05-25 | End: 2019-01-30

## 2018-05-30 ENCOUNTER — PATIENT MESSAGE (OUTPATIENT)
Dept: FAMILY MEDICINE | Facility: CLINIC | Age: 33
End: 2018-05-30

## 2018-05-30 RX ORDER — ZOLPIDEM TARTRATE 5 MG/1
5 TABLET ORAL NIGHTLY PRN
Qty: 10 TABLET | Refills: 0 | Status: SHIPPED | OUTPATIENT
Start: 2018-05-30 | End: 2018-06-26 | Stop reason: SDUPTHER

## 2018-06-26 RX ORDER — ALPRAZOLAM 0.25 MG/1
TABLET ORAL
Qty: 10 TABLET | Refills: 0 | OUTPATIENT
Start: 2018-06-26

## 2018-06-26 RX ORDER — ALPRAZOLAM 0.25 MG/1
TABLET ORAL
Qty: 10 TABLET | Refills: 0 | Status: SHIPPED | OUTPATIENT
Start: 2018-06-26 | End: 2019-12-30

## 2018-06-26 RX ORDER — ZOLPIDEM TARTRATE 5 MG/1
TABLET ORAL
Qty: 10 TABLET | Refills: 0 | Status: SHIPPED | OUTPATIENT
Start: 2018-06-26 | End: 2019-12-30

## 2018-06-29 RX ORDER — ESCITALOPRAM OXALATE 20 MG/1
TABLET ORAL
Qty: 30 TABLET | Refills: 11 | Status: SHIPPED | OUTPATIENT
Start: 2018-06-29 | End: 2019-06-26 | Stop reason: SDUPTHER

## 2018-07-10 RX ORDER — ALPRAZOLAM 0.25 MG/1
TABLET ORAL
Qty: 10 TABLET | Refills: 0 | OUTPATIENT
Start: 2018-07-10

## 2018-07-10 RX ORDER — ZOLPIDEM TARTRATE 5 MG/1
TABLET ORAL
Qty: 10 TABLET | Refills: 0 | OUTPATIENT
Start: 2018-07-10

## 2018-07-26 RX ORDER — ZOLPIDEM TARTRATE 5 MG/1
TABLET ORAL
Qty: 10 TABLET | Refills: 0 | OUTPATIENT
Start: 2018-07-26

## 2018-08-06 ENCOUNTER — TELEPHONE (OUTPATIENT)
Dept: OBSTETRICS AND GYNECOLOGY | Facility: CLINIC | Age: 33
End: 2018-08-06

## 2018-08-06 NOTE — TELEPHONE ENCOUNTER
----- Message from Gaby White sent at 8/6/2018  9:55 AM CDT -----  Contact: PT Portal Request  Message below from pt via portal.         I'm an  and I found out late last night that I needed to cover a court appearance this morning. I had to cancel my appointment today at 8:30. Please let me know if you have anything else this week. Thank you.

## 2018-08-13 RX ORDER — DEXTROAMPHETAMINE SACCHARATE, AMPHETAMINE ASPARTATE MONOHYDRATE, DEXTROAMPHETAMINE SULFATE AND AMPHETAMINE SULFATE 5; 5; 5; 5 MG/1; MG/1; MG/1; MG/1
20 CAPSULE, EXTENDED RELEASE ORAL EVERY MORNING
Qty: 30 CAPSULE | Refills: 0 | Status: SHIPPED | OUTPATIENT
Start: 2018-10-13 | End: 2020-05-05 | Stop reason: SDUPTHER

## 2018-08-13 RX ORDER — DEXTROAMPHETAMINE SACCHARATE, AMPHETAMINE ASPARTATE MONOHYDRATE, DEXTROAMPHETAMINE SULFATE AND AMPHETAMINE SULFATE 5; 5; 5; 5 MG/1; MG/1; MG/1; MG/1
20 CAPSULE, EXTENDED RELEASE ORAL EVERY MORNING
Qty: 30 CAPSULE | Refills: 0 | Status: SHIPPED | OUTPATIENT
Start: 2018-09-13 | End: 2018-10-12

## 2018-08-13 RX ORDER — DEXTROAMPHETAMINE SACCHARATE, AMPHETAMINE ASPARTATE MONOHYDRATE, DEXTROAMPHETAMINE SULFATE AND AMPHETAMINE SULFATE 5; 5; 5; 5 MG/1; MG/1; MG/1; MG/1
20 CAPSULE, EXTENDED RELEASE ORAL EVERY MORNING
Qty: 30 CAPSULE | Refills: 0 | Status: SHIPPED | OUTPATIENT
Start: 2018-08-13 | End: 2018-09-12

## 2018-10-02 RX ORDER — NORTRIPTYLINE HYDROCHLORIDE 50 MG/1
CAPSULE ORAL
Qty: 90 CAPSULE | Refills: 1 | Status: SHIPPED | OUTPATIENT
Start: 2018-10-02 | End: 2019-02-13 | Stop reason: SDUPTHER

## 2018-12-27 RX ORDER — GABAPENTIN 300 MG/1
CAPSULE ORAL
Qty: 90 CAPSULE | Refills: 1 | Status: SHIPPED | OUTPATIENT
Start: 2018-12-27 | End: 2019-01-30 | Stop reason: SDUPTHER

## 2019-01-30 RX ORDER — GABAPENTIN 300 MG/1
CAPSULE ORAL
Qty: 90 CAPSULE | Refills: 1 | Status: SHIPPED | OUTPATIENT
Start: 2019-01-30 | End: 2019-03-21 | Stop reason: SDUPTHER

## 2019-02-13 RX ORDER — NORTRIPTYLINE HYDROCHLORIDE 50 MG/1
CAPSULE ORAL
Qty: 90 CAPSULE | Refills: 1 | Status: SHIPPED | OUTPATIENT
Start: 2019-02-13 | End: 2019-06-17 | Stop reason: SDUPTHER

## 2019-03-21 RX ORDER — GABAPENTIN 300 MG/1
CAPSULE ORAL
Qty: 90 CAPSULE | Refills: 1 | Status: SHIPPED | OUTPATIENT
Start: 2019-03-21 | End: 2019-05-16 | Stop reason: SDUPTHER

## 2019-05-16 RX ORDER — GABAPENTIN 300 MG/1
CAPSULE ORAL
Qty: 90 CAPSULE | Refills: 0 | Status: SHIPPED | OUTPATIENT
Start: 2019-05-16 | End: 2019-06-05 | Stop reason: SDUPTHER

## 2019-06-05 RX ORDER — GABAPENTIN 300 MG/1
CAPSULE ORAL
Qty: 90 CAPSULE | Refills: 0 | Status: SHIPPED | OUTPATIENT
Start: 2019-06-05 | End: 2019-06-26 | Stop reason: SDUPTHER

## 2019-06-17 ENCOUNTER — TELEPHONE (OUTPATIENT)
Dept: PRIMARY CARE CLINIC | Facility: CLINIC | Age: 34
End: 2019-06-17

## 2019-06-17 DIAGNOSIS — Z00.00 ANNUAL PHYSICAL EXAM: Primary | ICD-10-CM

## 2019-06-17 RX ORDER — NORTRIPTYLINE HYDROCHLORIDE 50 MG/1
CAPSULE ORAL
Qty: 90 CAPSULE | Refills: 0 | Status: SHIPPED | OUTPATIENT
Start: 2019-06-17 | End: 2019-08-19 | Stop reason: SDUPTHER

## 2019-06-26 RX ORDER — GABAPENTIN 300 MG/1
CAPSULE ORAL
Qty: 90 CAPSULE | Refills: 1 | Status: SHIPPED | OUTPATIENT
Start: 2019-06-26 | End: 2019-08-07 | Stop reason: SDUPTHER

## 2019-06-26 RX ORDER — ESCITALOPRAM OXALATE 20 MG/1
TABLET ORAL
Qty: 30 TABLET | Refills: 1 | Status: SHIPPED | OUTPATIENT
Start: 2019-06-26 | End: 2019-09-04 | Stop reason: SDUPTHER

## 2019-06-26 NOTE — TELEPHONE ENCOUNTER
Phone msg left for pt Gabapentin and Escitalopram refills have been authorized.  Pt has upcoming annual exam 8/29/19.  Fasting labs should be drawn prior to annual exam.  Call [phone] to schedule fasting labs at least 3 days prior to appt.

## 2019-08-07 RX ORDER — GABAPENTIN 300 MG/1
300 CAPSULE ORAL 3 TIMES DAILY
Qty: 90 CAPSULE | Refills: 1 | Status: SHIPPED | OUTPATIENT
Start: 2019-08-07 | End: 2019-09-26 | Stop reason: SDUPTHER

## 2019-08-20 RX ORDER — NORTRIPTYLINE HYDROCHLORIDE 50 MG/1
CAPSULE ORAL
Qty: 90 CAPSULE | Refills: 0 | Status: SHIPPED | OUTPATIENT
Start: 2019-08-20 | End: 2019-10-14 | Stop reason: SDUPTHER

## 2019-09-04 RX ORDER — ESCITALOPRAM OXALATE 20 MG/1
20 TABLET ORAL DAILY
Qty: 30 TABLET | Refills: 0 | Status: SHIPPED | OUTPATIENT
Start: 2019-09-04 | End: 2019-09-30 | Stop reason: SDUPTHER

## 2019-09-04 NOTE — TELEPHONE ENCOUNTER
Phone msg left for pt Lexaprox refill has been authorized. Time for medication follow up appt, no labs needed.  Call [phone] to schedule.

## 2019-09-26 RX ORDER — GABAPENTIN 300 MG/1
CAPSULE ORAL
Qty: 90 CAPSULE | Refills: 0 | Status: SHIPPED | OUTPATIENT
Start: 2019-09-26 | End: 2019-10-14 | Stop reason: SDUPTHER

## 2019-09-26 NOTE — TELEPHONE ENCOUNTER
Phone msg left for pt Gabapentin refill authorized, but pt past due for annual exam and fasting labs.  Call [phone] to schedule both appts.

## 2019-09-30 RX ORDER — ESCITALOPRAM OXALATE 20 MG/1
TABLET ORAL
Qty: 30 TABLET | Refills: 0 | Status: SHIPPED | OUTPATIENT
Start: 2019-09-30 | End: 2019-10-30 | Stop reason: SDUPTHER

## 2019-10-14 RX ORDER — NORTRIPTYLINE HYDROCHLORIDE 50 MG/1
CAPSULE ORAL
Qty: 90 CAPSULE | Refills: 3 | Status: SHIPPED | OUTPATIENT
Start: 2019-10-14 | End: 2020-06-03 | Stop reason: SDUPTHER

## 2019-10-14 RX ORDER — GABAPENTIN 300 MG/1
CAPSULE ORAL
Qty: 90 CAPSULE | Refills: 3 | Status: SHIPPED | OUTPATIENT
Start: 2019-10-14 | End: 2019-12-26

## 2019-10-30 RX ORDER — ESCITALOPRAM OXALATE 20 MG/1
TABLET ORAL
Qty: 90 TABLET | Refills: 2 | Status: SHIPPED | OUTPATIENT
Start: 2019-10-30 | End: 2020-05-15

## 2019-12-26 RX ORDER — GABAPENTIN 300 MG/1
CAPSULE ORAL
Qty: 90 CAPSULE | Refills: 3 | Status: SHIPPED | OUTPATIENT
Start: 2019-12-26 | End: 2020-04-02

## 2019-12-26 NOTE — TELEPHONE ENCOUNTER
----- Message from Radha Hector sent at 12/26/2019 12:13 PM CST -----  Contact: self   Doctor appointment and lab have been scheduled.  Please link lab orders to the lab appointment.  Date of doctor appointment:  12/31  Date of lab appointment:  12/31  Physical or EP: physical  Comments:

## 2019-12-30 ENCOUNTER — DOCUMENTATION ONLY (OUTPATIENT)
Dept: PRIMARY CARE CLINIC | Facility: CLINIC | Age: 34
End: 2019-12-30

## 2019-12-30 NOTE — PROGRESS NOTES
Patient Review of Clinical Information     Problems   This clinical information was not verified.   Medications   This clinical information was not verified, but there are updates pending review:   No Longer Applicable Medications Start Date Reported By  Comments   ALPRAZolam 0.25 MG tablet 6/26/2018 Can Wright No rx   celecoxib 200 MG capsule 3/13/2017 Can Wright Stomach issues   Microgestin 1/20 (21) 1-20 mg-mcg per tablet 5/13/2015 Can Wright No rx currently   zolpidem 5 MG Tab 6/26/2018 Can Wright No rx   Allergies   This clinical information was not verified.

## 2019-12-31 ENCOUNTER — LAB VISIT (OUTPATIENT)
Dept: LAB | Facility: HOSPITAL | Age: 34
End: 2019-12-31

## 2019-12-31 DIAGNOSIS — Z00.00 ANNUAL PHYSICAL EXAM: ICD-10-CM

## 2019-12-31 LAB
ALBUMIN SERPL BCP-MCNC: 3.6 G/DL (ref 3.5–5.2)
ALP SERPL-CCNC: 109 U/L (ref 55–135)
ALT SERPL W/O P-5'-P-CCNC: 16 U/L (ref 10–44)
ANION GAP SERPL CALC-SCNC: 9 MMOL/L (ref 8–16)
AST SERPL-CCNC: 19 U/L (ref 10–40)
BASOPHILS # BLD AUTO: 0.07 K/UL (ref 0–0.2)
BASOPHILS NFR BLD: 0.7 % (ref 0–1.9)
BILIRUB SERPL-MCNC: 0.4 MG/DL (ref 0.1–1)
BUN SERPL-MCNC: 13 MG/DL (ref 6–20)
CALCIUM SERPL-MCNC: 9.9 MG/DL (ref 8.7–10.5)
CHLORIDE SERPL-SCNC: 102 MMOL/L (ref 95–110)
CHOLEST SERPL-MCNC: 273 MG/DL (ref 120–199)
CHOLEST/HDLC SERPL: 4.6 {RATIO} (ref 2–5)
CO2 SERPL-SCNC: 26 MMOL/L (ref 23–29)
CREAT SERPL-MCNC: 0.8 MG/DL (ref 0.5–1.4)
DIFFERENTIAL METHOD: ABNORMAL
EOSINOPHIL # BLD AUTO: 0.1 K/UL (ref 0–0.5)
EOSINOPHIL NFR BLD: 1.3 % (ref 0–8)
ERYTHROCYTE [DISTWIDTH] IN BLOOD BY AUTOMATED COUNT: 13.2 % (ref 11.5–14.5)
EST. GFR  (AFRICAN AMERICAN): >60 ML/MIN/1.73 M^2
EST. GFR  (NON AFRICAN AMERICAN): >60 ML/MIN/1.73 M^2
GLUCOSE SERPL-MCNC: 84 MG/DL (ref 70–110)
HCT VFR BLD AUTO: 44.7 % (ref 37–48.5)
HDLC SERPL-MCNC: 59 MG/DL (ref 40–75)
HDLC SERPL: 21.6 % (ref 20–50)
HGB BLD-MCNC: 13.7 G/DL (ref 12–16)
IMM GRANULOCYTES # BLD AUTO: 0.05 K/UL (ref 0–0.04)
IMM GRANULOCYTES NFR BLD AUTO: 0.5 % (ref 0–0.5)
LDLC SERPL CALC-MCNC: 155 MG/DL (ref 63–159)
LYMPHOCYTES # BLD AUTO: 3.8 K/UL (ref 1–4.8)
LYMPHOCYTES NFR BLD: 36.9 % (ref 18–48)
MCH RBC QN AUTO: 27.7 PG (ref 27–31)
MCHC RBC AUTO-ENTMCNC: 30.6 G/DL (ref 32–36)
MCV RBC AUTO: 91 FL (ref 82–98)
MONOCYTES # BLD AUTO: 0.6 K/UL (ref 0.3–1)
MONOCYTES NFR BLD: 5.5 % (ref 4–15)
NEUTROPHILS # BLD AUTO: 5.7 K/UL (ref 1.8–7.7)
NEUTROPHILS NFR BLD: 55.1 % (ref 38–73)
NONHDLC SERPL-MCNC: 214 MG/DL
NRBC BLD-RTO: 0 /100 WBC
PLATELET # BLD AUTO: 409 K/UL (ref 150–350)
PMV BLD AUTO: 9.9 FL (ref 9.2–12.9)
POTASSIUM SERPL-SCNC: 4.6 MMOL/L (ref 3.5–5.1)
PROT SERPL-MCNC: 7.5 G/DL (ref 6–8.4)
RBC # BLD AUTO: 4.94 M/UL (ref 4–5.4)
SODIUM SERPL-SCNC: 137 MMOL/L (ref 136–145)
TRIGL SERPL-MCNC: 295 MG/DL (ref 30–150)
WBC # BLD AUTO: 10.34 K/UL (ref 3.9–12.7)

## 2019-12-31 PROCEDURE — 36415 COLL VENOUS BLD VENIPUNCTURE: CPT | Mod: PN

## 2019-12-31 PROCEDURE — 80061 LIPID PANEL: CPT

## 2019-12-31 PROCEDURE — 85025 COMPLETE CBC W/AUTO DIFF WBC: CPT

## 2019-12-31 PROCEDURE — 80053 COMPREHEN METABOLIC PANEL: CPT

## 2020-01-02 ENCOUNTER — TELEPHONE (OUTPATIENT)
Dept: PRIMARY CARE CLINIC | Facility: CLINIC | Age: 35
End: 2020-01-02

## 2020-01-02 NOTE — TELEPHONE ENCOUNTER
Pt cancelled her PHYS appt , but did have labs drawn.     I last saw her May 2018    Please offer Follow Up Med appt to review her labs, or can offer PHYS since pap smear is due

## 2020-01-02 NOTE — TELEPHONE ENCOUNTER
LMOR labs drawn 12/31/19, but annual exam appt was cancelled.  Please call our office to reschedule your appointment so labs can be discussed.

## 2020-03-12 RX ORDER — GABAPENTIN 300 MG/1
CAPSULE ORAL
Qty: 90 CAPSULE | Refills: 3 | OUTPATIENT
Start: 2020-03-12

## 2020-03-20 ENCOUNTER — PATIENT MESSAGE (OUTPATIENT)
Dept: PRIMARY CARE CLINIC | Facility: CLINIC | Age: 35
End: 2020-03-20

## 2020-04-02 RX ORDER — GABAPENTIN 300 MG/1
CAPSULE ORAL
Qty: 90 CAPSULE | Refills: 3 | Status: SHIPPED | OUTPATIENT
Start: 2020-04-02 | End: 2020-06-03 | Stop reason: SDUPTHER

## 2020-05-04 ENCOUNTER — TELEPHONE (OUTPATIENT)
Dept: PRIMARY CARE CLINIC | Facility: CLINIC | Age: 35
End: 2020-05-04

## 2020-05-04 DIAGNOSIS — Z00.00 ANNUAL PHYSICAL EXAM: Primary | ICD-10-CM

## 2020-05-04 DIAGNOSIS — Z11.4 SCREENING FOR HIV WITHOUT PRESENCE OF RISK FACTORS: ICD-10-CM

## 2020-05-04 NOTE — TELEPHONE ENCOUNTER
----- Message from Lele Lyon sent at 5/4/2020 11:37 AM CDT -----  Contact: Can- [phone]  Caller is requesting an earlier appointment than what we can offer.   Did you offer to schedule the next available appt and put the patient on the wait list:  Yes  When is the first available appointment: 8/19/2020  Preference of timeframe to be scheduled:  June at the latest  Symptoms: ADHD refill and physical  Would the patient prefer a call back or a response via [facility]:  Call back (both)  Additional Information:  Pt needs to be seen earlier than August. Please call to schedule.

## 2020-05-04 NOTE — TELEPHONE ENCOUNTER
Spoke with patient and advised that she will need to keep her annual exam in August.  She can have an earlier virtual visit with one of Dr. Witt's colleagues to discuss the resumption of her adderall.  Virtual visit scheduled for tomorrow with Dr. Lovell.  Fasting lab appointment also scheduled for August.

## 2020-05-05 ENCOUNTER — TELEPHONE (OUTPATIENT)
Dept: PRIMARY CARE CLINIC | Facility: CLINIC | Age: 35
End: 2020-05-05

## 2020-05-05 ENCOUNTER — OFFICE VISIT (OUTPATIENT)
Dept: PRIMARY CARE CLINIC | Facility: CLINIC | Age: 35
End: 2020-05-05
Payer: MEDICAID

## 2020-05-05 DIAGNOSIS — F90.0 ATTENTION DEFICIT HYPERACTIVITY DISORDER (ADHD), PREDOMINANTLY INATTENTIVE TYPE: Primary | ICD-10-CM

## 2020-05-05 PROCEDURE — 99213 PR OFFICE/OUTPT VISIT, EST, LEVL III, 20-29 MIN: ICD-10-PCS | Mod: 95,,, | Performed by: FAMILY MEDICINE

## 2020-05-05 PROCEDURE — 99213 OFFICE O/P EST LOW 20 MIN: CPT | Mod: 95,,, | Performed by: FAMILY MEDICINE

## 2020-05-05 RX ORDER — DEXTROAMPHETAMINE SACCHARATE, AMPHETAMINE ASPARTATE MONOHYDRATE, DEXTROAMPHETAMINE SULFATE AND AMPHETAMINE SULFATE 5; 5; 5; 5 MG/1; MG/1; MG/1; MG/1
20 CAPSULE, EXTENDED RELEASE ORAL EVERY MORNING
Qty: 30 CAPSULE | Refills: 0 | Status: SHIPPED | OUTPATIENT
Start: 2020-05-05 | End: 2020-05-29 | Stop reason: SDUPTHER

## 2020-05-05 NOTE — TELEPHONE ENCOUNTER
This patient will need a follow up appointment in one month with Dr. Witt, her PCP for follow up of ADD and physical exam, including pap.  thanks

## 2020-05-05 NOTE — PROGRESS NOTES
Subjective:       Patient ID: Can Wright is a 34 y.o. female.    Chief Complaint: No chief complaint on file.    The patient location is: home  The chief complaint leading to consultation is: refill ADD medication  Visit type: audiovisual  Total time spent with patient: 20min  Each patient to whom he or she provides medical services by telemedicine is:  (1) informed of the relationship between the physician and patient and the respective role of any other health care provider with respect to management of the patient; and (2) notified that he or she may decline to receive medical services by telemedicine and may withdraw from such care at any time.    Notes: .Patient presents for followup of previously well-documented diagnosis of attention deficit disorder.  She has not been taking her Adderall for several months and would like to restart medication at her previous dosage.  She denies any known side effects from the medication in the past.  She is an , presently working from home    Review of Systems   Constitutional: Negative for activity change, appetite change, fatigue and unexpected weight change.   Respiratory: Negative for chest tightness and shortness of breath.    Cardiovascular: Negative for chest pain, palpitations and leg swelling.   Neurological: Negative for dizziness, tremors, weakness, light-headedness and headaches.   Psychiatric/Behavioral: Negative for agitation, confusion, decreased concentration, self-injury and sleep disturbance. The patient is not nervous/anxious and is not hyperactive.        Objective:      Physical Exam   Constitutional: She is oriented to person, place, and time. She appears well-developed and well-nourished. No distress.   Eyes: Pupils are equal, round, and reactive to light. EOM are normal.   Neurological: She is alert and oriented to person, place, and time.   Psychiatric: She has a normal mood and affect. Her behavior is normal. Judgment and thought  content normal.       Assessment:       1. Attention deficit hyperactivity disorder (ADHD), predominantly inattentive type        Plan:       1.  Restart medication  2.  She will need f/u with PCP in one month for follow up and physical exam, including pap

## 2020-05-15 RX ORDER — ESCITALOPRAM OXALATE 20 MG/1
TABLET ORAL
Qty: 90 TABLET | Refills: 0 | Status: SHIPPED | OUTPATIENT
Start: 2020-05-15 | End: 2020-08-12

## 2020-05-29 RX ORDER — DEXTROAMPHETAMINE SACCHARATE, AMPHETAMINE ASPARTATE MONOHYDRATE, DEXTROAMPHETAMINE SULFATE AND AMPHETAMINE SULFATE 5; 5; 5; 5 MG/1; MG/1; MG/1; MG/1
20 CAPSULE, EXTENDED RELEASE ORAL EVERY MORNING
Qty: 30 CAPSULE | Refills: 0 | Status: SHIPPED | OUTPATIENT
Start: 2020-05-29 | End: 2020-06-30 | Stop reason: SDUPTHER

## 2020-06-30 RX ORDER — DEXTROAMPHETAMINE SACCHARATE, AMPHETAMINE ASPARTATE MONOHYDRATE, DEXTROAMPHETAMINE SULFATE AND AMPHETAMINE SULFATE 5; 5; 5; 5 MG/1; MG/1; MG/1; MG/1
20 CAPSULE, EXTENDED RELEASE ORAL EVERY MORNING
Qty: 30 CAPSULE | Refills: 0 | Status: SHIPPED | OUTPATIENT
Start: 2020-07-30 | End: 2020-07-06 | Stop reason: SDUPTHER

## 2020-06-30 RX ORDER — DEXTROAMPHETAMINE SACCHARATE, AMPHETAMINE ASPARTATE MONOHYDRATE, DEXTROAMPHETAMINE SULFATE AND AMPHETAMINE SULFATE 5; 5; 5; 5 MG/1; MG/1; MG/1; MG/1
20 CAPSULE, EXTENDED RELEASE ORAL EVERY MORNING
Qty: 30 CAPSULE | Refills: 0 | Status: SHIPPED | OUTPATIENT
Start: 2020-08-29 | End: 2020-07-06 | Stop reason: SDUPTHER

## 2020-06-30 RX ORDER — DEXTROAMPHETAMINE SACCHARATE, AMPHETAMINE ASPARTATE MONOHYDRATE, DEXTROAMPHETAMINE SULFATE AND AMPHETAMINE SULFATE 5; 5; 5; 5 MG/1; MG/1; MG/1; MG/1
20 CAPSULE, EXTENDED RELEASE ORAL EVERY MORNING
Qty: 30 CAPSULE | Refills: 0 | Status: SHIPPED | OUTPATIENT
Start: 2020-06-30 | End: 2020-07-06 | Stop reason: SDUPTHER

## 2020-07-03 DIAGNOSIS — F90.0 ATTENTION DEFICIT HYPERACTIVITY DISORDER (ADHD), PREDOMINANTLY INATTENTIVE TYPE: Primary | ICD-10-CM

## 2020-07-06 NOTE — TELEPHONE ENCOUNTER
LOV 05/05/2020 (Dr. Area - ADHD)  Appointment scheduled for 08/19/2020    Spoke with the pharmacist, they advised that the prescriptions must have the Dx code on the Rx to be filled since the pt has Medicaid.     Please resend Rx(s)

## 2020-07-07 DIAGNOSIS — F90.0 ATTENTION DEFICIT HYPERACTIVITY DISORDER (ADHD), PREDOMINANTLY INATTENTIVE TYPE: ICD-10-CM

## 2020-07-07 RX ORDER — DEXTROAMPHETAMINE SACCHARATE, AMPHETAMINE ASPARTATE MONOHYDRATE, DEXTROAMPHETAMINE SULFATE AND AMPHETAMINE SULFATE 5; 5; 5; 5 MG/1; MG/1; MG/1; MG/1
20 CAPSULE, EXTENDED RELEASE ORAL EVERY MORNING
Qty: 30 CAPSULE | Refills: 0 | Status: SHIPPED | OUTPATIENT
Start: 2020-08-05 | End: 2020-07-07 | Stop reason: SDUPTHER

## 2020-07-07 RX ORDER — DEXTROAMPHETAMINE SACCHARATE, AMPHETAMINE ASPARTATE MONOHYDRATE, DEXTROAMPHETAMINE SULFATE AND AMPHETAMINE SULFATE 5; 5; 5; 5 MG/1; MG/1; MG/1; MG/1
20 CAPSULE, EXTENDED RELEASE ORAL EVERY MORNING
Qty: 30 CAPSULE | Refills: 0 | Status: SHIPPED | OUTPATIENT
Start: 2020-08-05 | End: 2020-09-04

## 2020-07-07 RX ORDER — DEXTROAMPHETAMINE SACCHARATE, AMPHETAMINE ASPARTATE MONOHYDRATE, DEXTROAMPHETAMINE SULFATE AND AMPHETAMINE SULFATE 5; 5; 5; 5 MG/1; MG/1; MG/1; MG/1
20 CAPSULE, EXTENDED RELEASE ORAL EVERY MORNING
Qty: 30 CAPSULE | Refills: 0 | Status: SHIPPED | OUTPATIENT
Start: 2020-07-07 | End: 2020-08-06

## 2020-07-07 RX ORDER — DEXTROAMPHETAMINE SACCHARATE, AMPHETAMINE ASPARTATE MONOHYDRATE, DEXTROAMPHETAMINE SULFATE AND AMPHETAMINE SULFATE 5; 5; 5; 5 MG/1; MG/1; MG/1; MG/1
20 CAPSULE, EXTENDED RELEASE ORAL EVERY MORNING
Qty: 30 CAPSULE | Refills: 0 | Status: SHIPPED | OUTPATIENT
Start: 2020-09-04 | End: 2020-07-07 | Stop reason: SDUPTHER

## 2020-07-07 RX ORDER — DEXTROAMPHETAMINE SACCHARATE, AMPHETAMINE ASPARTATE MONOHYDRATE, DEXTROAMPHETAMINE SULFATE AND AMPHETAMINE SULFATE 5; 5; 5; 5 MG/1; MG/1; MG/1; MG/1
20 CAPSULE, EXTENDED RELEASE ORAL EVERY MORNING
Qty: 30 CAPSULE | Refills: 0 | Status: SHIPPED | OUTPATIENT
Start: 2020-09-04 | End: 2020-10-19 | Stop reason: SDUPTHER

## 2020-07-07 RX ORDER — DEXTROAMPHETAMINE SACCHARATE, AMPHETAMINE ASPARTATE MONOHYDRATE, DEXTROAMPHETAMINE SULFATE AND AMPHETAMINE SULFATE 5; 5; 5; 5 MG/1; MG/1; MG/1; MG/1
20 CAPSULE, EXTENDED RELEASE ORAL EVERY MORNING
Qty: 30 CAPSULE | Refills: 0 | Status: SHIPPED | OUTPATIENT
Start: 2020-07-07 | End: 2020-07-07 | Stop reason: SDUPTHER

## 2020-07-14 LAB — HPV MRNA E6/E7: NOT DETECTED

## 2020-07-16 LAB — HPV MRNA E6/E7: NOT DETECTED

## 2020-08-05 ENCOUNTER — PATIENT OUTREACH (OUTPATIENT)
Dept: ADMINISTRATIVE | Facility: HOSPITAL | Age: 35
End: 2020-08-05

## 2020-08-05 DIAGNOSIS — Z11.59 NEED FOR HEPATITIS C SCREENING TEST: Primary | ICD-10-CM

## 2020-08-05 NOTE — PROGRESS NOTES
Pre-visit chart review completed.  Immunizations reviewed and updated.    Patient due for the following    Hepatitis C Screening     HIV Screening     Cervical Cancer Screening       Labs scheduled  Hep c ordered and linked to upcoming lab appointment.

## 2020-08-12 RX ORDER — ESCITALOPRAM OXALATE 20 MG/1
TABLET ORAL
Qty: 90 TABLET | Refills: 3 | Status: SHIPPED | OUTPATIENT
Start: 2020-08-12 | End: 2021-07-22

## 2020-08-14 ENCOUNTER — LAB VISIT (OUTPATIENT)
Dept: LAB | Facility: HOSPITAL | Age: 35
End: 2020-08-14
Attending: FAMILY MEDICINE
Payer: MEDICAID

## 2020-08-14 DIAGNOSIS — Z11.4 SCREENING FOR HIV WITHOUT PRESENCE OF RISK FACTORS: ICD-10-CM

## 2020-08-14 DIAGNOSIS — Z00.00 ANNUAL PHYSICAL EXAM: ICD-10-CM

## 2020-08-14 DIAGNOSIS — Z11.59 NEED FOR HEPATITIS C SCREENING TEST: ICD-10-CM

## 2020-08-14 LAB
ALBUMIN SERPL BCP-MCNC: 3.6 G/DL (ref 3.5–5.2)
ALP SERPL-CCNC: 82 U/L (ref 55–135)
ALT SERPL W/O P-5'-P-CCNC: 12 U/L (ref 10–44)
ANION GAP SERPL CALC-SCNC: 9 MMOL/L (ref 8–16)
AST SERPL-CCNC: 16 U/L (ref 10–40)
BASOPHILS # BLD AUTO: 0.05 K/UL (ref 0–0.2)
BASOPHILS NFR BLD: 0.4 % (ref 0–1.9)
BILIRUB SERPL-MCNC: 0.4 MG/DL (ref 0.1–1)
BUN SERPL-MCNC: 9 MG/DL (ref 6–20)
CALCIUM SERPL-MCNC: 9.3 MG/DL (ref 8.7–10.5)
CHLORIDE SERPL-SCNC: 103 MMOL/L (ref 95–110)
CHOLEST SERPL-MCNC: 243 MG/DL (ref 120–199)
CHOLEST/HDLC SERPL: 5.1 {RATIO} (ref 2–5)
CO2 SERPL-SCNC: 24 MMOL/L (ref 23–29)
CREAT SERPL-MCNC: 0.8 MG/DL (ref 0.5–1.4)
DIFFERENTIAL METHOD: ABNORMAL
EOSINOPHIL # BLD AUTO: 0.1 K/UL (ref 0–0.5)
EOSINOPHIL NFR BLD: 1.1 % (ref 0–8)
ERYTHROCYTE [DISTWIDTH] IN BLOOD BY AUTOMATED COUNT: 13.4 % (ref 11.5–14.5)
EST. GFR  (AFRICAN AMERICAN): >60 ML/MIN/1.73 M^2
EST. GFR  (NON AFRICAN AMERICAN): >60 ML/MIN/1.73 M^2
GLUCOSE SERPL-MCNC: 93 MG/DL (ref 70–110)
HCT VFR BLD AUTO: 43.3 % (ref 37–48.5)
HDLC SERPL-MCNC: 48 MG/DL (ref 40–75)
HDLC SERPL: 19.8 % (ref 20–50)
HGB BLD-MCNC: 13.6 G/DL (ref 12–16)
IMM GRANULOCYTES # BLD AUTO: 0.05 K/UL (ref 0–0.04)
IMM GRANULOCYTES NFR BLD AUTO: 0.4 % (ref 0–0.5)
LDLC SERPL CALC-MCNC: 133.4 MG/DL (ref 63–159)
LYMPHOCYTES # BLD AUTO: 3.1 K/UL (ref 1–4.8)
LYMPHOCYTES NFR BLD: 27 % (ref 18–48)
MCH RBC QN AUTO: 27.6 PG (ref 27–31)
MCHC RBC AUTO-ENTMCNC: 31.4 G/DL (ref 32–36)
MCV RBC AUTO: 88 FL (ref 82–98)
MONOCYTES # BLD AUTO: 0.5 K/UL (ref 0.3–1)
MONOCYTES NFR BLD: 4.2 % (ref 4–15)
NEUTROPHILS # BLD AUTO: 7.6 K/UL (ref 1.8–7.7)
NEUTROPHILS NFR BLD: 66.9 % (ref 38–73)
NONHDLC SERPL-MCNC: 195 MG/DL
NRBC BLD-RTO: 0 /100 WBC
PLATELET # BLD AUTO: 441 K/UL (ref 150–350)
PMV BLD AUTO: 9.9 FL (ref 9.2–12.9)
POTASSIUM SERPL-SCNC: 4 MMOL/L (ref 3.5–5.1)
PROT SERPL-MCNC: 7.8 G/DL (ref 6–8.4)
RBC # BLD AUTO: 4.92 M/UL (ref 4–5.4)
SODIUM SERPL-SCNC: 136 MMOL/L (ref 136–145)
TRIGL SERPL-MCNC: 308 MG/DL (ref 30–150)
WBC # BLD AUTO: 11.39 K/UL (ref 3.9–12.7)

## 2020-08-14 PROCEDURE — 80061 LIPID PANEL: CPT

## 2020-08-14 PROCEDURE — 80053 COMPREHEN METABOLIC PANEL: CPT

## 2020-08-14 PROCEDURE — 36415 COLL VENOUS BLD VENIPUNCTURE: CPT | Mod: PO

## 2020-08-14 PROCEDURE — 86703 HIV-1/HIV-2 1 RESULT ANTBDY: CPT

## 2020-08-14 PROCEDURE — 86803 HEPATITIS C AB TEST: CPT

## 2020-08-14 PROCEDURE — 85025 COMPLETE CBC W/AUTO DIFF WBC: CPT

## 2020-08-17 LAB — HIV 1+2 AB+HIV1 P24 AG SERPL QL IA: NEGATIVE

## 2020-08-18 LAB — HCV AB SERPL QL IA: NEGATIVE

## 2020-10-05 ENCOUNTER — PATIENT MESSAGE (OUTPATIENT)
Dept: PRIMARY CARE CLINIC | Facility: CLINIC | Age: 35
End: 2020-10-05

## 2020-10-05 RX ORDER — GABAPENTIN 300 MG/1
300 CAPSULE ORAL 3 TIMES DAILY
Qty: 90 CAPSULE | Refills: 3 | Status: SHIPPED | OUTPATIENT
Start: 2020-10-05 | End: 2021-01-11 | Stop reason: SDUPTHER

## 2020-10-05 NOTE — TELEPHONE ENCOUNTER
AV 05/05/2020 ADHD  LAV 05/08/2018    Spoke with Elina at [facility].  Medicaid does not cover gabapentin, it's $88.

## 2020-10-19 DIAGNOSIS — F90.0 ATTENTION DEFICIT HYPERACTIVITY DISORDER (ADHD), PREDOMINANTLY INATTENTIVE TYPE: ICD-10-CM

## 2020-10-20 RX ORDER — DEXTROAMPHETAMINE SACCHARATE, AMPHETAMINE ASPARTATE MONOHYDRATE, DEXTROAMPHETAMINE SULFATE AND AMPHETAMINE SULFATE 5; 5; 5; 5 MG/1; MG/1; MG/1; MG/1
20 CAPSULE, EXTENDED RELEASE ORAL EVERY MORNING
Qty: 30 CAPSULE | Refills: 0 | Status: SHIPPED | OUTPATIENT
Start: 2020-10-20 | End: 2020-11-19

## 2020-10-23 ENCOUNTER — PATIENT OUTREACH (OUTPATIENT)
Dept: ADMINISTRATIVE | Facility: HOSPITAL | Age: 35
End: 2020-10-23

## 2020-10-23 NOTE — PROGRESS NOTES
Pre-visit chart review completed.  Immunizations reviewed and updated.    Patient due for the following    Cervical Cancer Screening     Influenza Vaccine (1)

## 2020-10-23 NOTE — PROGRESS NOTES
Pre-visit chart review completed.  Immunizations reviewed and updated.    Patient due for the following    Cervical Cancer Screening     Influenza Vaccine (1)        My [facility] msg sent to patient regarding gyn exam

## 2020-11-03 ENCOUNTER — OFFICE VISIT (OUTPATIENT)
Dept: PRIMARY CARE CLINIC | Facility: CLINIC | Age: 35
End: 2020-11-03
Payer: MEDICAID

## 2020-11-03 VITALS
SYSTOLIC BLOOD PRESSURE: 135 MMHG | BODY MASS INDEX: 35.88 KG/M2 | DIASTOLIC BLOOD PRESSURE: 84 MMHG | HEIGHT: 61 IN | TEMPERATURE: 98 F | OXYGEN SATURATION: 98 % | HEART RATE: 96 BPM | WEIGHT: 190.06 LBS

## 2020-11-03 DIAGNOSIS — G93.5 CHIARI MALFORMATION TYPE I: ICD-10-CM

## 2020-11-03 DIAGNOSIS — Z00.00 ROUTINE GENERAL MEDICAL EXAMINATION AT A HEALTH CARE FACILITY: Primary | ICD-10-CM

## 2020-11-03 DIAGNOSIS — F90.0 ATTENTION DEFICIT HYPERACTIVITY DISORDER (ADHD), PREDOMINANTLY INATTENTIVE TYPE: ICD-10-CM

## 2020-11-03 DIAGNOSIS — G95.0 SYRINGOMYELIA: ICD-10-CM

## 2020-11-03 DIAGNOSIS — F34.1 DYSTHYMIC DISORDER: ICD-10-CM

## 2020-11-03 PROCEDURE — 99214 OFFICE O/P EST MOD 30 MIN: CPT | Mod: PBBFAC,PN | Performed by: FAMILY MEDICINE

## 2020-11-03 PROCEDURE — 99395 PREV VISIT EST AGE 18-39: CPT | Mod: S$PBB,,, | Performed by: FAMILY MEDICINE

## 2020-11-03 PROCEDURE — 99999 PR PBB SHADOW E&M-EST. PATIENT-LVL IV: ICD-10-PCS | Mod: PBBFAC,,, | Performed by: FAMILY MEDICINE

## 2020-11-03 PROCEDURE — 99395 PR PREVENTIVE VISIT,EST,18-39: ICD-10-PCS | Mod: S$PBB,,, | Performed by: FAMILY MEDICINE

## 2020-11-03 PROCEDURE — 99999 PR PBB SHADOW E&M-EST. PATIENT-LVL IV: CPT | Mod: PBBFAC,,, | Performed by: FAMILY MEDICINE

## 2020-11-03 RX ORDER — DEXTROAMPHETAMINE SACCHARATE, AMPHETAMINE ASPARTATE MONOHYDRATE, DEXTROAMPHETAMINE SULFATE AND AMPHETAMINE SULFATE 5; 5; 5; 5 MG/1; MG/1; MG/1; MG/1
20 CAPSULE, EXTENDED RELEASE ORAL EVERY MORNING
Qty: 30 CAPSULE | Refills: 0 | Status: SHIPPED | OUTPATIENT
Start: 2020-12-20 | End: 2021-01-18

## 2020-11-03 RX ORDER — DEXTROAMPHETAMINE SACCHARATE, AMPHETAMINE ASPARTATE MONOHYDRATE, DEXTROAMPHETAMINE SULFATE AND AMPHETAMINE SULFATE 5; 5; 5; 5 MG/1; MG/1; MG/1; MG/1
20 CAPSULE, EXTENDED RELEASE ORAL EVERY MORNING
Qty: 30 CAPSULE | Refills: 0 | Status: SHIPPED | OUTPATIENT
Start: 2021-01-20 | End: 2021-01-20 | Stop reason: SDUPTHER

## 2020-11-03 RX ORDER — NORETHINDRONE ACETATE AND ETHINYL ESTRADIOL, ETHINYL ESTRADIOL AND FERROUS FUMARATE 1MG-10(24)
KIT ORAL
COMMUNITY
Start: 2020-07-15 | End: 2022-01-27 | Stop reason: ALTCHOICE

## 2020-11-03 RX ORDER — PROPRANOLOL HYDROCHLORIDE 10 MG/1
10 TABLET ORAL 2 TIMES DAILY PRN
Qty: 30 TABLET | Refills: 11 | Status: SHIPPED | OUTPATIENT
Start: 2020-11-03 | End: 2021-04-13

## 2020-11-03 RX ORDER — DEXTROAMPHETAMINE SACCHARATE, AMPHETAMINE ASPARTATE MONOHYDRATE, DEXTROAMPHETAMINE SULFATE AND AMPHETAMINE SULFATE 5; 5; 5; 5 MG/1; MG/1; MG/1; MG/1
20 CAPSULE, EXTENDED RELEASE ORAL EVERY MORNING
Qty: 30 CAPSULE | Refills: 0 | Status: SHIPPED | OUTPATIENT
Start: 2020-11-20 | End: 2020-12-20

## 2020-11-03 NOTE — PROGRESS NOTES
Subjective:      Patient ID: Can Wright is a 35 y.o. female.    Chief Complaint: Annual Exam    Here today for general exam.     Also for ADD medication  - Adderall XR 20mg qd    It works well for focus, concentration, completing tasks.    Denies any side effects of palpitations, chest pain, shortness of breath, weight loss, decreased appetite, sweating    Since her last visit with me, she has seen gynecologist Dr. Zamora at     For syringomyelia, she follows with Neurologist & is tx with gabapentin & nortriptyline    Denies any nausea vomiting constipation diarrhea, blood in stool,    With spaghetti, occassianal heartburn      Current Outpatient Medications:     dextroamphetamine-amphetamine (ADDERALL XR) 20 MG 24 hr capsule, Take 1 capsule (20 mg total) by mouth every morning. ICD-10: F90.0 - Attention deficit hyperactivity disorder (ADHD), predominantly inattentive type, Disp: 30 capsule, Rfl: 0    escitalopram oxalate (LEXAPRO) 20 MG tablet, TAKE 1 TABLET BY MOUTH EVERY DAY, Disp: 90 tablet, Rfl: 3    gabapentin (NEURONTIN) 300 MG capsule, Take 1 capsule (300 mg total) by mouth 3 (three) times daily., Disp: 90 capsule, Rfl: 3    norethindrone-e.estradioL-iron (LO LOESTRIN FE) 1 mg-10 mcg (24)/10 mcg (2) Tab, , Disp: , Rfl:     nortriptyline (PAMELOR) 50 MG capsule, Take 1 capsule (50 mg total) by mouth every evening., Disp: 90 capsule, Rfl: 3    [START ON 11/20/2020] dextroamphetamine-amphetamine (ADDERALL XR) 20 MG 24 hr capsule, Take 1 capsule (20 mg total) by mouth every morning., Disp: 30 capsule, Rfl: 0    [START ON 12/20/2020] dextroamphetamine-amphetamine (ADDERALL XR) 20 MG 24 hr capsule, Take 1 capsule (20 mg total) by mouth every morning., Disp: 30 capsule, Rfl: 0    [START ON 1/20/2021] dextroamphetamine-amphetamine (ADDERALL XR) 20 MG 24 hr capsule, Take 1 capsule (20 mg total) by mouth every morning., Disp: 30 capsule, Rfl: 0    propranoloL (INDERAL) 10 MG tablet, Take 1  tablet (10 mg total) by mouth 2 (two) times daily as needed (anxiety)., Disp: 30 tablet, Rfl: 11    No results found for: HGBA1C  No results found for: MICALBCREAT  Lab Results   Component Value Date    LDLCALC 133.4 08/14/2020    LDLCALC 155.0 12/31/2019    CHOL 243 (H) 08/14/2020    HDL 48 08/14/2020    TRIG 308 (H) 08/14/2020       Lab Results   Component Value Date     08/14/2020    K 4.0 08/14/2020     08/14/2020    CO2 24 08/14/2020    GLU 93 08/14/2020    BUN 9 08/14/2020    CREATININE 0.8 08/14/2020    CALCIUM 9.3 08/14/2020    PROT 7.8 08/14/2020    ALBUMIN 3.6 08/14/2020    BILITOT 0.4 08/14/2020    ALKPHOS 82 08/14/2020    AST 16 08/14/2020    ALT 12 08/14/2020    ANIONGAP 9 08/14/2020    ESTGFRAFRICA >60.0 08/14/2020    EGFRNONAA >60.0 08/14/2020    WBC 11.39 08/14/2020    HGB 13.6 08/14/2020    HGB 13.7 12/31/2019    HCT 43.3 08/14/2020    MCV 88 08/14/2020     (H) 08/14/2020    TSH 1.35 08/10/2011    HEPCAB Negative 08/14/2020       No results found for: LH, FSH, TOTALTESTOST, PROGESTERONE, ESTRADIOL, [id number], [id number], FERRITIN, IRON, TRANSFERRIN, TIBC, FESATURATED, ZINC      Past Medical History:   Diagnosis Date    ADD (attention deficit disorder)     Allergy     Carpal tunnel syndrome on both sides 2012    celebrex qd with zantac, Bilateral injections Dr Talbert, Celebrex expensive    Chiari I malformation     Dysthymic disorder     lexapro    Hyperlipidemia     Syringomyelia     tramadol prn, Dr Lyon, steroids worked initially, tx Celebrex, Neurontin, Tyl #3 (helps prn), pamelor, Dr Gracia recommended surgery     No past surgical history on file.  Social History     Social History Narrative    , boyfriend works Security has 10 yo dtr    lives with mom - who has Fibromyalgia & macular degeneration, (dad passed young) single, no children, nosmoker ETOH 1-2 x / mo    GYN Naoh     Family History   Problem Relation Age of Onset    Hyperlipidemia Mother   "   Fibromyalgia Mother     Arthritis Mother     Depression Father     Arthritis Maternal Grandmother     Heart disease Maternal Grandfather     Cancer Maternal Uncle         liver     Vitals:    11/03/20 1046 11/03/20 1058   BP: (!) 130/102 135/84   Pulse: (!) 111 96   Temp: 97.7 °F (36.5 °C)    SpO2: 98%    Weight: 86.2 kg (190 lb 0.6 oz)    Height: 5' 1" (1.549 m)    PainSc:   1      Objective:   Physical Exam  Constitutional:       Appearance: She is well-developed.   HENT:      Head: Normocephalic and atraumatic.      Nose:      Comments: Wearing mask due to current COVID 19 pandemic, Nose & mouth exam deferred  Eyes:      Pupils: Pupils are equal, round, and reactive to light.   Neck:      Musculoskeletal: Neck supple.      Thyroid: No thyromegaly.   Cardiovascular:      Rate and Rhythm: Normal rate and regular rhythm.      Heart sounds: Normal heart sounds. No murmur.   Pulmonary:      Effort: Pulmonary effort is normal.      Breath sounds: Normal breath sounds. No wheezing.   Abdominal:      General: Bowel sounds are normal. There is no distension.      Palpations: Abdomen is soft. There is no mass.      Tenderness: There is no abdominal tenderness. There is no guarding or rebound.   Lymphadenopathy:      Cervical: No cervical adenopathy.   Skin:     General: Skin is warm and dry.   Neurological:      Mental Status: She is alert and oriented to person, place, and time.   Psychiatric:         Behavior: Behavior normal.       Assessment:     1. Routine general medical examination at a health care facility    2. Attention deficit hyperactivity disorder (ADHD), predominantly inattentive type    3. Dysthymic disorder    4. Syringomyelia    5. Chiari malformation type I      Plan:     Orders Placed This Encounter    dextroamphetamine-amphetamine (ADDERALL XR) 20 MG 24 hr capsule    dextroamphetamine-amphetamine (ADDERALL XR) 20 MG 24 hr capsule    dextroamphetamine-amphetamine (ADDERALL XR) 20 MG 24 hr " capsule    propranoloL (INDERAL) 10 MG tablet     In 6 MONTHS,  feel free to make a VIRTUAL VIDEO appointment with me for refills.      ===========================================  In 3 MONTHS, you can EMAIL me for 3 more prescriptions to send ELECTRONICALLY to your REGULAR PHARMACY    Once a YEAR (every other 6 MONTHS), I would like to see you in the office to check blood pressure & perform a physical exam.    Please let me know if you have any side effects - including palpitations, weight loss, chest pain, elevated blood pressure. Please do not increase the dosage without discussing it with me first.     If you are female & are contemplating pregnancy or become pregnant, STOP this medication. It can affect a fetus.     Of course if you are seen for another ailment in the meantime, we can discuss your medicine at that visit, which will suffice.     Please contact me if you have any other questions. Feel free to make your appointments online.    Check out [email or web address]  - LISTEN TO HIS PODCAST ON EATING FOR ADHD  ============================================    Follow with GYN for female health & cancer prevention    Due for  Vaccines  - at your pharmacy    Continue medications. Follow with specialists  ==============================  RECOMMENDATIONS FOR FEMALES  ==============================  Your #1 MEDICINE is DAILY EXERCISE - 15-20 minutes of huffing & puffing EVERY DAY.     Prevent the #1 cause of death- cardiovascular disease (HEART ATTACK & STROKE) by checking for normal blood pressure, cholesterol, sugars, & by not smoking.     VACCINES: Yearly FLU shot,    I recommend  high fiber (5 fresh fruits or vegetables daily), low fat diet and aerobic  exercise (huffing/ puffing/ sweating for 20 min straight at least 4 days a week)    Follow up yearly with mammogram, fasting lipids, CMP, CBC prior.   ==============================================================      There are no Patient Instructions on file for  this visit.

## 2020-11-20 ENCOUNTER — PATIENT MESSAGE (OUTPATIENT)
Dept: PRIMARY CARE CLINIC | Facility: CLINIC | Age: 35
End: 2020-11-20

## 2020-11-23 RX ORDER — FLUTICASONE PROPIONATE 50 MCG
1 SPRAY, SUSPENSION (ML) NASAL DAILY
Qty: 16 G | Refills: 12 | Status: SHIPPED | OUTPATIENT
Start: 2020-11-23 | End: 2022-01-25

## 2020-12-21 ENCOUNTER — PATIENT OUTREACH (OUTPATIENT)
Dept: ADMINISTRATIVE | Facility: HOSPITAL | Age: 35
End: 2020-12-21

## 2020-12-21 NOTE — LETTER
AUTHORIZATION FOR RELEASE OF   CONFIDENTIAL INFORMATION    Dear Noah,    We are seeing Can Wright, date of birth 1985, in the clinic at [facility]. Bertha Witt MD is the patient's PCP. Can Wright has an outstanding lab/procedure at the time we reviewed her chart. In order to help keep her health information updated, she has authorized us to request the following medical record(s):        (  )  MAMMOGRAM                                      (  )  COLONOSCOPY      ( X )  PAP SMEAR                                          (  )  OUTSIDE LAB RESULTS     (  )  DEXA SCAN                                          (  )  EYE EXAM            (  )  FOOT EXAM                                          (  )  ENTIRE RECORD     (  )  OUTSIDE IMMUNIZATIONS                 (  )  _______________         Please fax records to [facility], Tara Berner, MD, [phone]     If you have any questions, please contact Jessica at [phone].           Patient Name: Can Wright  : 1985  Patient Phone #: [phone]

## 2020-12-23 ENCOUNTER — PATIENT OUTREACH (OUTPATIENT)
Dept: ADMINISTRATIVE | Facility: HOSPITAL | Age: 35
End: 2020-12-23

## 2021-01-08 ENCOUNTER — PATIENT OUTREACH (OUTPATIENT)
Dept: ADMINISTRATIVE | Facility: HOSPITAL | Age: 36
End: 2021-01-08

## 2021-01-20 ENCOUNTER — PATIENT MESSAGE (OUTPATIENT)
Dept: PRIMARY CARE CLINIC | Facility: CLINIC | Age: 36
End: 2021-01-20

## 2021-01-20 DIAGNOSIS — F90.0 ATTENTION DEFICIT HYPERACTIVITY DISORDER (ADHD), PREDOMINANTLY INATTENTIVE TYPE: Primary | ICD-10-CM

## 2021-01-20 RX ORDER — DEXTROAMPHETAMINE SACCHARATE, AMPHETAMINE ASPARTATE MONOHYDRATE, DEXTROAMPHETAMINE SULFATE AND AMPHETAMINE SULFATE 5; 5; 5; 5 MG/1; MG/1; MG/1; MG/1
20 CAPSULE, EXTENDED RELEASE ORAL EVERY MORNING
Qty: 30 CAPSULE | Refills: 0 | Status: SHIPPED | OUTPATIENT
Start: 2021-01-20 | End: 2021-02-17 | Stop reason: SDUPTHER

## 2021-03-18 ENCOUNTER — PATIENT MESSAGE (OUTPATIENT)
Dept: PRIMARY CARE CLINIC | Facility: CLINIC | Age: 36
End: 2021-03-18

## 2021-05-03 ENCOUNTER — PATIENT MESSAGE (OUTPATIENT)
Dept: PRIMARY CARE CLINIC | Facility: CLINIC | Age: 36
End: 2021-05-03

## 2021-05-03 RX ORDER — GABAPENTIN 400 MG/1
400 CAPSULE ORAL 3 TIMES DAILY
Qty: 90 CAPSULE | Refills: 11 | Status: SHIPPED | OUTPATIENT
Start: 2021-05-03 | End: 2022-03-09 | Stop reason: SDUPTHER

## 2021-06-03 ENCOUNTER — TELEPHONE (OUTPATIENT)
Dept: DERMATOLOGY | Facility: CLINIC | Age: 36
End: 2021-06-03

## 2021-06-07 RX ORDER — DEXTROAMPHETAMINE SACCHARATE, AMPHETAMINE ASPARTATE MONOHYDRATE, DEXTROAMPHETAMINE SULFATE AND AMPHETAMINE SULFATE 5; 5; 5; 5 MG/1; MG/1; MG/1; MG/1
20 CAPSULE, EXTENDED RELEASE ORAL EVERY MORNING
Qty: 30 CAPSULE | Refills: 0 | Status: SHIPPED | OUTPATIENT
Start: 2021-06-07 | End: 2021-07-09 | Stop reason: SDUPTHER

## 2021-10-13 ENCOUNTER — PATIENT MESSAGE (OUTPATIENT)
Dept: PRIMARY CARE CLINIC | Facility: CLINIC | Age: 36
End: 2021-10-13

## 2021-11-02 RX ORDER — NORTRIPTYLINE HYDROCHLORIDE 50 MG/1
50 CAPSULE ORAL NIGHTLY
Qty: 90 CAPSULE | Refills: 0 | Status: SHIPPED | OUTPATIENT
Start: 2021-11-02 | End: 2021-11-30

## 2021-11-02 RX ORDER — NORTRIPTYLINE HYDROCHLORIDE 50 MG/1
50 CAPSULE ORAL NIGHTLY
Qty: 90 CAPSULE | Refills: 3 | OUTPATIENT
Start: 2021-11-02

## 2021-11-09 ENCOUNTER — PATIENT MESSAGE (OUTPATIENT)
Dept: PRIMARY CARE CLINIC | Facility: CLINIC | Age: 36
End: 2021-11-09
Payer: MEDICAID

## 2021-11-09 ENCOUNTER — TELEPHONE (OUTPATIENT)
Dept: PRIMARY CARE CLINIC | Facility: CLINIC | Age: 36
End: 2021-11-09
Payer: MEDICAID

## 2021-11-09 DIAGNOSIS — F90.0 ATTENTION DEFICIT HYPERACTIVITY DISORDER (ADHD), PREDOMINANTLY INATTENTIVE TYPE: ICD-10-CM

## 2021-11-09 RX ORDER — DEXTROAMPHETAMINE SACCHARATE, AMPHETAMINE ASPARTATE MONOHYDRATE, DEXTROAMPHETAMINE SULFATE AND AMPHETAMINE SULFATE 5; 5; 5; 5 MG/1; MG/1; MG/1; MG/1
20 CAPSULE, EXTENDED RELEASE ORAL EVERY MORNING
Qty: 30 CAPSULE | Refills: 0 | OUTPATIENT
Start: 2021-11-09 | End: 2021-12-08

## 2021-11-10 ENCOUNTER — OFFICE VISIT (OUTPATIENT)
Dept: PRIMARY CARE CLINIC | Facility: CLINIC | Age: 36
End: 2021-11-10
Payer: MEDICAID

## 2021-11-10 ENCOUNTER — TELEPHONE (OUTPATIENT)
Dept: PRIMARY CARE CLINIC | Facility: CLINIC | Age: 36
End: 2021-11-10

## 2021-11-10 ENCOUNTER — PATIENT MESSAGE (OUTPATIENT)
Dept: PRIMARY CARE CLINIC | Facility: CLINIC | Age: 36
End: 2021-11-10

## 2021-11-10 VITALS — DIASTOLIC BLOOD PRESSURE: 72 MMHG | SYSTOLIC BLOOD PRESSURE: 125 MMHG

## 2021-11-10 DIAGNOSIS — F90.0 ATTENTION DEFICIT HYPERACTIVITY DISORDER (ADHD), PREDOMINANTLY INATTENTIVE TYPE: Primary | ICD-10-CM

## 2021-11-10 DIAGNOSIS — G95.0 SYRINGOMYELIA: ICD-10-CM

## 2021-11-10 DIAGNOSIS — F34.1 DYSTHYMIC DISORDER: ICD-10-CM

## 2021-11-10 DIAGNOSIS — Z00.00 ROUTINE GENERAL MEDICAL EXAMINATION AT A HEALTH CARE FACILITY: ICD-10-CM

## 2021-11-10 DIAGNOSIS — G93.5 CHIARI MALFORMATION TYPE I: ICD-10-CM

## 2021-11-10 PROCEDURE — 99213 PR OFFICE/OUTPT VISIT, EST, LEVL III, 20-29 MIN: ICD-10-PCS | Mod: 95,,, | Performed by: FAMILY MEDICINE

## 2021-11-10 PROCEDURE — 99213 OFFICE O/P EST LOW 20 MIN: CPT | Mod: 95,,, | Performed by: FAMILY MEDICINE

## 2021-11-10 RX ORDER — DEXTROAMPHETAMINE SACCHARATE, AMPHETAMINE ASPARTATE MONOHYDRATE, DEXTROAMPHETAMINE SULFATE AND AMPHETAMINE SULFATE 5; 5; 5; 5 MG/1; MG/1; MG/1; MG/1
20 CAPSULE, EXTENDED RELEASE ORAL EVERY MORNING
Qty: 30 CAPSULE | Refills: 0 | Status: SHIPPED | OUTPATIENT
Start: 2021-11-15 | End: 2021-12-08 | Stop reason: SDUPTHER

## 2021-11-30 RX ORDER — NORTRIPTYLINE HYDROCHLORIDE 50 MG/1
50 CAPSULE ORAL NIGHTLY
Qty: 90 CAPSULE | Refills: 3 | Status: SHIPPED | OUTPATIENT
Start: 2021-11-30 | End: 2022-06-23

## 2021-12-08 DIAGNOSIS — F90.0 ATTENTION DEFICIT HYPERACTIVITY DISORDER (ADHD), PREDOMINANTLY INATTENTIVE TYPE: ICD-10-CM

## 2021-12-08 RX ORDER — DEXTROAMPHETAMINE SACCHARATE, AMPHETAMINE ASPARTATE MONOHYDRATE, DEXTROAMPHETAMINE SULFATE AND AMPHETAMINE SULFATE 5; 5; 5; 5 MG/1; MG/1; MG/1; MG/1
20 CAPSULE, EXTENDED RELEASE ORAL EVERY MORNING
Qty: 30 CAPSULE | Refills: 0 | Status: SHIPPED | OUTPATIENT
Start: 2021-12-08 | End: 2022-01-10 | Stop reason: SDUPTHER

## 2021-12-10 ENCOUNTER — PATIENT MESSAGE (OUTPATIENT)
Dept: PRIMARY CARE CLINIC | Facility: CLINIC | Age: 36
End: 2021-12-10
Payer: MEDICAID

## 2021-12-13 ENCOUNTER — LAB VISIT (OUTPATIENT)
Dept: LAB | Facility: HOSPITAL | Age: 36
End: 2021-12-13
Attending: FAMILY MEDICINE
Payer: MEDICAID

## 2021-12-13 DIAGNOSIS — Z00.00 ROUTINE GENERAL MEDICAL EXAMINATION AT A HEALTH CARE FACILITY: ICD-10-CM

## 2021-12-13 LAB
ALBUMIN SERPL BCP-MCNC: 3.2 G/DL (ref 3.5–5.2)
ALP SERPL-CCNC: 66 U/L (ref 55–135)
ALT SERPL W/O P-5'-P-CCNC: 11 U/L (ref 10–44)
ANION GAP SERPL CALC-SCNC: 13 MMOL/L (ref 8–16)
AST SERPL-CCNC: 14 U/L (ref 10–40)
BASOPHILS # BLD AUTO: 0.05 K/UL (ref 0–0.2)
BASOPHILS NFR BLD: 0.4 % (ref 0–1.9)
BILIRUB SERPL-MCNC: 0.4 MG/DL (ref 0.1–1)
BUN SERPL-MCNC: 12 MG/DL (ref 6–20)
CALCIUM SERPL-MCNC: 9.1 MG/DL (ref 8.7–10.5)
CHLORIDE SERPL-SCNC: 103 MMOL/L (ref 95–110)
CHOLEST SERPL-MCNC: 226 MG/DL (ref 120–199)
CHOLEST/HDLC SERPL: 4.8 {RATIO} (ref 2–5)
CO2 SERPL-SCNC: 21 MMOL/L (ref 23–29)
CREAT SERPL-MCNC: 0.8 MG/DL (ref 0.5–1.4)
DIFFERENTIAL METHOD: ABNORMAL
EOSINOPHIL # BLD AUTO: 0.1 K/UL (ref 0–0.5)
EOSINOPHIL NFR BLD: 0.9 % (ref 0–8)
ERYTHROCYTE [DISTWIDTH] IN BLOOD BY AUTOMATED COUNT: 14.1 % (ref 11.5–14.5)
EST. GFR  (AFRICAN AMERICAN): >60 ML/MIN/1.73 M^2
EST. GFR  (NON AFRICAN AMERICAN): >60 ML/MIN/1.73 M^2
GLUCOSE SERPL-MCNC: 86 MG/DL (ref 70–110)
HCT VFR BLD AUTO: 42.2 % (ref 37–48.5)
HDLC SERPL-MCNC: 47 MG/DL (ref 40–75)
HDLC SERPL: 20.8 % (ref 20–50)
HGB BLD-MCNC: 13 G/DL (ref 12–16)
IMM GRANULOCYTES # BLD AUTO: 0.04 K/UL (ref 0–0.04)
IMM GRANULOCYTES NFR BLD AUTO: 0.3 % (ref 0–0.5)
LDLC SERPL CALC-MCNC: 121 MG/DL (ref 63–159)
LYMPHOCYTES # BLD AUTO: 3.3 K/UL (ref 1–4.8)
LYMPHOCYTES NFR BLD: 27.9 % (ref 18–48)
MCH RBC QN AUTO: 27.4 PG (ref 27–31)
MCHC RBC AUTO-ENTMCNC: 30.8 G/DL (ref 32–36)
MCV RBC AUTO: 89 FL (ref 82–98)
MONOCYTES # BLD AUTO: 0.5 K/UL (ref 0.3–1)
MONOCYTES NFR BLD: 4.6 % (ref 4–15)
NEUTROPHILS # BLD AUTO: 7.8 K/UL (ref 1.8–7.7)
NEUTROPHILS NFR BLD: 65.9 % (ref 38–73)
NONHDLC SERPL-MCNC: 179 MG/DL
NRBC BLD-RTO: 0 /100 WBC
PLATELET # BLD AUTO: 419 K/UL (ref 150–450)
PMV BLD AUTO: 9.7 FL (ref 9.2–12.9)
POTASSIUM SERPL-SCNC: 4.3 MMOL/L (ref 3.5–5.1)
PROT SERPL-MCNC: 7.3 G/DL (ref 6–8.4)
RBC # BLD AUTO: 4.74 M/UL (ref 4–5.4)
SODIUM SERPL-SCNC: 137 MMOL/L (ref 136–145)
TRIGL SERPL-MCNC: 290 MG/DL (ref 30–150)
TSH SERPL DL<=0.005 MIU/L-ACNC: 2.43 UIU/ML (ref 0.4–4)
WBC # BLD AUTO: 11.84 K/UL (ref 3.9–12.7)

## 2021-12-13 PROCEDURE — 80061 LIPID PANEL: CPT | Performed by: FAMILY MEDICINE

## 2021-12-13 PROCEDURE — 84443 ASSAY THYROID STIM HORMONE: CPT | Performed by: FAMILY MEDICINE

## 2021-12-13 PROCEDURE — 36415 COLL VENOUS BLD VENIPUNCTURE: CPT | Mod: PO | Performed by: FAMILY MEDICINE

## 2021-12-13 PROCEDURE — 85025 COMPLETE CBC W/AUTO DIFF WBC: CPT | Performed by: FAMILY MEDICINE

## 2021-12-13 PROCEDURE — 80053 COMPREHEN METABOLIC PANEL: CPT | Performed by: FAMILY MEDICINE

## 2022-01-10 DIAGNOSIS — F90.0 ATTENTION DEFICIT HYPERACTIVITY DISORDER (ADHD), PREDOMINANTLY INATTENTIVE TYPE: ICD-10-CM

## 2022-01-10 RX ORDER — DEXTROAMPHETAMINE SACCHARATE, AMPHETAMINE ASPARTATE MONOHYDRATE, DEXTROAMPHETAMINE SULFATE AND AMPHETAMINE SULFATE 5; 5; 5; 5 MG/1; MG/1; MG/1; MG/1
20 CAPSULE, EXTENDED RELEASE ORAL EVERY MORNING
Qty: 30 CAPSULE | Refills: 0 | Status: CANCELLED | OUTPATIENT
Start: 2022-01-10 | End: 2022-02-08

## 2022-01-11 NOTE — TELEPHONE ENCOUNTER
No new care gaps identified.  Powered by [employer]. Reference number: [id number].   1/10/2022 9:59:20 PM CST

## 2022-01-12 DIAGNOSIS — F90.0 ATTENTION DEFICIT HYPERACTIVITY DISORDER (ADHD), PREDOMINANTLY INATTENTIVE TYPE: ICD-10-CM

## 2022-01-12 RX ORDER — DEXTROAMPHETAMINE SACCHARATE, AMPHETAMINE ASPARTATE MONOHYDRATE, DEXTROAMPHETAMINE SULFATE AND AMPHETAMINE SULFATE 5; 5; 5; 5 MG/1; MG/1; MG/1; MG/1
20 CAPSULE, EXTENDED RELEASE ORAL EVERY MORNING
Qty: 30 CAPSULE | Refills: 0 | Status: SHIPPED | OUTPATIENT
Start: 2022-01-12 | End: 2022-02-09 | Stop reason: SDUPTHER

## 2022-01-12 RX ORDER — DEXTROAMPHETAMINE SACCHARATE, AMPHETAMINE ASPARTATE MONOHYDRATE, DEXTROAMPHETAMINE SULFATE AND AMPHETAMINE SULFATE 5; 5; 5; 5 MG/1; MG/1; MG/1; MG/1
20 CAPSULE, EXTENDED RELEASE ORAL EVERY MORNING
Qty: 30 CAPSULE | Refills: 0 | Status: CANCELLED | OUTPATIENT
Start: 2022-01-10 | End: 2022-02-08

## 2022-01-12 NOTE — TELEPHONE ENCOUNTER
No new care gaps identified.  Powered by [email or web address]. Reference number: [id number].   1/12/2022 12:17:08 PM CST

## 2022-01-20 NOTE — TELEPHONE ENCOUNTER
No new care gaps identified.  Powered by [employer]. Reference number: [id number].   1/20/2022 9:05:09 AM CST

## 2022-01-22 ENCOUNTER — PATIENT MESSAGE (OUTPATIENT)
Dept: PRIMARY CARE CLINIC | Facility: CLINIC | Age: 37
End: 2022-01-22
Payer: MEDICAID

## 2022-01-25 RX ORDER — FLUTICASONE PROPIONATE 50 MCG
SPRAY, SUSPENSION (ML) NASAL
Qty: 48 ML | Refills: 3 | Status: ON HOLD | OUTPATIENT
Start: 2022-01-25 | End: 2023-05-01 | Stop reason: HOSPADM

## 2022-01-25 NOTE — TELEPHONE ENCOUNTER
Refill Authorization Note   Can Wright  is requesting a refill authorization.  Brief Assessment and Rationale for Refill:  Approve     Medication Therapy Plan:       Medication Reconciliation Completed: No   Comments:   --->Care Gap information included below if applicable.   Orders Placed This Encounter    fluticasone propionate (FLONASE) 50 mcg/actuation nasal spray      Requested Prescriptions   Signed Prescriptions Disp Refills    fluticasone propionate (FLONASE) 50 mcg/actuation nasal spray 48 mL 3     Sig: INSTILL ONE SPRAY INTO EACH NOSTRIL ONCE DAILY       Ear, Nose, and Throat: Nasal Preparations - Corticosteroids Passed - 1/20/2022  9:04 AM        Passed - Patient is at least 18 years old        Passed - Negative Pregnancy Status Check        Passed - Valid encounter within last 15 months     Recent Visits  Date Type Provider Dept   11/10/21 Office Visit Bertha Witt MD [facility]   11/03/20 Office Visit Bertha Witt MD [facility]   Showing recent visits within past 720 days and meeting all other requirements  Future Appointments  No visits were found meeting these conditions.  Showing future appointments within next 150 days and meeting all other requirements      Future Appointments              In 2 days Bertha Witt MD [facility] - [facility], [place]                    Appointments  past 12m or future 3m with PCP    Date Provider   Last Visit   11/10/2021 Bertha Witt MD   Next Visit   1/27/2022 Bertha Witt MD   ED visits in past 90 days: 0     Note composed:12:58 PM 01/25/2022

## 2022-01-27 ENCOUNTER — OFFICE VISIT (OUTPATIENT)
Dept: PRIMARY CARE CLINIC | Facility: CLINIC | Age: 37
End: 2022-01-27
Payer: MEDICAID

## 2022-01-27 VITALS
BODY MASS INDEX: 36.8 KG/M2 | HEART RATE: 100 BPM | HEIGHT: 61 IN | SYSTOLIC BLOOD PRESSURE: 128 MMHG | OXYGEN SATURATION: 98 % | DIASTOLIC BLOOD PRESSURE: 88 MMHG | WEIGHT: 194.88 LBS | TEMPERATURE: 98 F

## 2022-01-27 DIAGNOSIS — U07.1 COVID-19 VIRUS INFECTION: ICD-10-CM

## 2022-01-27 DIAGNOSIS — F34.1 DYSTHYMIC DISORDER: ICD-10-CM

## 2022-01-27 DIAGNOSIS — F41.9 ANXIETY: ICD-10-CM

## 2022-01-27 DIAGNOSIS — G93.5 CHIARI MALFORMATION TYPE I: ICD-10-CM

## 2022-01-27 DIAGNOSIS — G44.039 EPISODIC PAROXYSMAL HEMICRANIA, NOT INTRACTABLE: ICD-10-CM

## 2022-01-27 DIAGNOSIS — G95.0 SYRINGOMYELIA: ICD-10-CM

## 2022-01-27 DIAGNOSIS — F90.2 ATTENTION DEFICIT HYPERACTIVITY DISORDER (ADHD), COMBINED TYPE: ICD-10-CM

## 2022-01-27 DIAGNOSIS — Z00.00 ROUTINE GENERAL MEDICAL EXAMINATION AT A HEALTH CARE FACILITY: Primary | ICD-10-CM

## 2022-01-27 PROCEDURE — 1160F RVW MEDS BY RX/DR IN RCRD: CPT | Mod: CPTII,,, | Performed by: FAMILY MEDICINE

## 2022-01-27 PROCEDURE — 3008F PR BODY MASS INDEX (BMI) DOCUMENTED: ICD-10-PCS | Mod: CPTII,,, | Performed by: FAMILY MEDICINE

## 2022-01-27 PROCEDURE — 99999 PR PBB SHADOW E&M-EST. PATIENT-LVL IV: CPT | Mod: PBBFAC,,, | Performed by: FAMILY MEDICINE

## 2022-01-27 PROCEDURE — 3074F SYST BP LT 130 MM HG: CPT | Mod: CPTII,,, | Performed by: FAMILY MEDICINE

## 2022-01-27 PROCEDURE — 3079F PR MOST RECENT DIASTOLIC BLOOD PRESSURE 80-89 MM HG: ICD-10-PCS | Mod: CPTII,,, | Performed by: FAMILY MEDICINE

## 2022-01-27 PROCEDURE — 99395 PR PREVENTIVE VISIT,EST,18-39: ICD-10-PCS | Mod: S$PBB,,, | Performed by: FAMILY MEDICINE

## 2022-01-27 PROCEDURE — 1160F PR REVIEW ALL MEDS BY PRESCRIBER/CLIN PHARMACIST DOCUMENTED: ICD-10-PCS | Mod: CPTII,,, | Performed by: FAMILY MEDICINE

## 2022-01-27 PROCEDURE — 3008F BODY MASS INDEX DOCD: CPT | Mod: CPTII,,, | Performed by: FAMILY MEDICINE

## 2022-01-27 PROCEDURE — 99214 OFFICE O/P EST MOD 30 MIN: CPT | Mod: PBBFAC,PN | Performed by: FAMILY MEDICINE

## 2022-01-27 PROCEDURE — 3079F DIAST BP 80-89 MM HG: CPT | Mod: CPTII,,, | Performed by: FAMILY MEDICINE

## 2022-01-27 PROCEDURE — 1159F MED LIST DOCD IN RCRD: CPT | Mod: CPTII,,, | Performed by: FAMILY MEDICINE

## 2022-01-27 PROCEDURE — 99999 PR PBB SHADOW E&M-EST. PATIENT-LVL IV: ICD-10-PCS | Mod: PBBFAC,,, | Performed by: FAMILY MEDICINE

## 2022-01-27 PROCEDURE — 3074F PR MOST RECENT SYSTOLIC BLOOD PRESSURE < 130 MM HG: ICD-10-PCS | Mod: CPTII,,, | Performed by: FAMILY MEDICINE

## 2022-01-27 PROCEDURE — 1159F PR MEDICATION LIST DOCUMENTED IN MEDICAL RECORD: ICD-10-PCS | Mod: CPTII,,, | Performed by: FAMILY MEDICINE

## 2022-01-27 PROCEDURE — 99395 PREV VISIT EST AGE 18-39: CPT | Mod: S$PBB,,, | Performed by: FAMILY MEDICINE

## 2022-01-27 RX ORDER — NORETHINDRONE ACETATE AND ETHINYL ESTRADIOL 1MG-20(21)
KIT ORAL
COMMUNITY
End: 2022-11-18

## 2022-01-27 NOTE — PATIENT INSTRUCTIONS
In 6 MONTHS,  feel free to make a VIRTUAL VIDEO appointment with me for refills.      ===========================================  In 3 MONTHS, you can EMAIL me for 3 more prescriptions to send ELECTRONICALLY to your REGULAR PHARMACY    Once a YEAR (every other 6 MONTHS), I would like to see you in the office to check blood pressure & perform a physical exam.    Please let me know if you have any side effects - including palpitations, weight loss, chest pain, elevated blood pressure. Please do not increase the dosage without discussing it with me first.     If you are female & are contemplating pregnancy or become pregnant, STOP this medication. It can affect a fetus.     Of course if you are seen for another ailment in the meantime, we can discuss your medicine at that visit, which will suffice.     Please contact me if you have any other questions. Feel free to make your appointments online.    ============================================  OTHER TIPS FOR ADD:  ============================================    FANTASTIC BOOK TO KEEP AT YOUR BEDSIDE- ADD Success Stories by Trevor Betancourt    Try stopping one of these for a week at a time & see how your brain responds -  Cut out sugar, carbohydrates, processed foods.     Spend 5- 10 min a day making a Daily To-Do list    Try the Pomodoro technique (look it up) of focused work for 25 min, then take 5 min break    Check out [email or web address]  - LISTEN TO HIS PODCAST ON EATING FOR ADHD    Get restful uninterrupted SLEEP every night     Get outside & walk/ exercise at least 10 minutes EVERY day.     On your phone, under settings, turn off ALL notifications.     Keep your phone turned face down when you're studying or working or charging it,  as not to be distracted on the task in front of you.     TRY THIS & SEE HOW PRODUCTIVE YOU ARE - Under Settings, screen time, limit social media & texting to ONE HOUR a day.     Be aware of your total screen time that your spend on your  "phone on a daily basis. If you're trying to complete a task, be aware that you were distracted the amount of time that you were on your phone.     Avoid alcohol, marijuana, vaping, tobacco, drugs - these all hit the "reward center" of your brain but ultimately depresses your brain function  ============================================  For headaches:    MAGNESIUM oxide supplementation 400 mg at bedtime  for headache prophylaxis    RIBOFLAVIN 100 mg twice daily    Increase water intake      "

## 2022-01-27 NOTE — PROGRESS NOTES
"Subjective:      Patient ID: Can Wright is a 36 y.o. female.    Chief Complaint: Annual Exam    Here today for general exam.     Presents today to discuss ADD medication - Adderall XR 20 mg qd    It works well for focus, concentration, completing tasks.    Denies any side effects of palpitations, chest pain, shortness of breath, weight loss, decreased appetite, sweating    The last time we spoke, she increase Neurontin 400 mg 3 times a day for carpal tunnel & arthritis.      Released from neuro  specialists Dr. Lyon & Samia after her symptoms resolved (syringomyelia, Chiari). She felt headaches were due to different stressful situations she was undergoing at the time.     , nortriptyline 50 mg nightly, Lexapro 20 q.d., propranolol 10 mg prn  to help with anxiety & sleep.     Had COVID 1/14 , no fever just aches & nasal sx. Every day since I'll have a headache, in & out all day, took Alleve or Excedrin migrane, on Sat it did increase to "migrane". That passed. Was having thick mucus plugs, thick green mucus, but getting better    Denies any chest pain, shortness of breath, nausea vomiting constipation diarrhea, blood in stool, heartburn      Current Outpatient Medications:     dextroamphetamine-amphetamine (ADDERALL XR) 20 MG 24 hr capsule, Take 1 capsule (20 mg total) by mouth every morning. Dx ADD F98.8, Disp: 30 capsule, Rfl: 0    EScitalopram oxalate (LEXAPRO) 20 MG tablet, TAKE 1 TABLET BY MOUTH EVERY DAY, Disp: 90 tablet, Rfl: 3    fluticasone propionate (FLONASE) 50 mcg/actuation nasal spray, INSTILL ONE SPRAY INTO EACH NOSTRIL ONCE DAILY, Disp: 48 mL, Rfl: 3    gabapentin (NEURONTIN) 400 MG capsule, Take 1 capsule (400 mg total) by mouth 3 (three) times daily., Disp: 90 capsule, Rfl: 11    norethindrone-ethinyl estradiol (JUNEL FE 1/20) 1 mg-20 mcg (21)/75 mg (7) per tablet, , Disp: , Rfl:     nortriptyline (PAMELOR) 50 MG capsule, Take 1 capsule (50 mg total) by mouth every evening., " Disp: 90 capsule, Rfl: 3    propranoloL (INDERAL) 10 MG tablet, TAKE 1 TABLET BY MOUTH TWICE A DAY AS NEEDED FOR ANXIETY, Disp: 90 tablet, Rfl: 3    No results found for: HGBA1C  No results found for: MICALBCREAT  Lab Results   Component Value Date    LDLCALC 121.0 12/13/2021    LDLCALC 133.4 08/14/2020    CHOL 226 (H) 12/13/2021    HDL 47 12/13/2021    TRIG 290 (H) 12/13/2021       Lab Results   Component Value Date     12/13/2021    K 4.3 12/13/2021     12/13/2021    CO2 21 (L) 12/13/2021    GLU 86 12/13/2021    BUN 12 12/13/2021    CREATININE 0.8 12/13/2021    CALCIUM 9.1 12/13/2021    PROT 7.3 12/13/2021    ALBUMIN 3.2 (L) 12/13/2021    BILITOT 0.4 12/13/2021    ALKPHOS 66 12/13/2021    AST 14 12/13/2021    ALT 11 12/13/2021    ANIONGAP 13 12/13/2021    ESTGFRAFRICA >60.0 12/13/2021    EGFRNONAA >60.0 12/13/2021    WBC 11.84 12/13/2021    HGB 13.0 12/13/2021    HGB 13.6 08/14/2020    HCT 42.2 12/13/2021    MCV 89 12/13/2021     12/13/2021    TSH 2.430 12/13/2021    HEPCAB Negative 08/14/2020       No results found for: LH, FSH, TOTALTESTOST, PROGESTERONE, ESTRADIOL, ATDTSMEV81CB, HAFJHRGQ89, FERRITIN, IRON, TRANSFERRIN, TIBC, FESATURATED, ZINC      Past Medical History:   Diagnosis Date    ADD (attention deficit disorder)     Allergy     Anxiety     , nortriptyline 50 mg nightly, Lexapro 20 q.d., propranolol 10 mg prn    Attention deficit hyperactivity disorder (ADHD), combined type 4/24/2014    Carpal tunnel syndrome on both sides 2012    Neurontin 400 mg 3 times a day for carpal tunnel & arthritis. Bilateral injections Dr Talbert, Celebrex expensive    Chiari I malformation     Dysthymic disorder     lexapro    Episodic paroxysmal hemicrania, not intractable 1/27/2022    Hyperlipidemia     Syringomyelia     asymptomatic, released from Dr Lyon, steroids worked initially, tx Celebrex, Neurontin, Tyl #3 (helps prn), pamhusseinor, Dr Gracia recommended surgery     History reviewed. No  "pertinent surgical history.  Social History     Social History Narrative    , boyfriend works Security has 10 yo dtr    lives with mom - who has Fibromyalgia & macular degeneration, (dad passed young) single, no children, nosmoker ETOH 1-2 x / mo    GYN Noah     Family History   Problem Relation Age of Onset    Hyperlipidemia Mother     Fibromyalgia Mother     Arthritis Mother     Depression Father     Arthritis Maternal Grandmother     Heart disease Maternal Grandfather     Cancer Maternal Uncle         liver     Vitals:    01/27/22 0930   BP: 128/88   Pulse: 100   Temp: 98.3 °F (36.8 °C)   SpO2: 98%   Weight: 88.4 kg (194 lb 14.2 oz)   Height: 5' 1" (1.549 m)   PainSc:   3     Objective:   Physical Exam  Constitutional:       Appearance: She is well-developed.   HENT:      Head: Normocephalic and atraumatic.      Nose:      Comments: Wearing mask due to current COVID 19 pandemic, Nose & mouth exam deferred  Eyes:      Pupils: Pupils are equal, round, and reactive to light.   Neck:      Thyroid: No thyromegaly.   Cardiovascular:      Rate and Rhythm: Normal rate and regular rhythm.      Heart sounds: Normal heart sounds. No murmur heard.      Pulmonary:      Effort: Pulmonary effort is normal.      Breath sounds: Normal breath sounds. No wheezing.   Abdominal:      General: Bowel sounds are normal. There is no distension.      Palpations: Abdomen is soft. There is no mass.      Tenderness: There is no abdominal tenderness. There is no guarding or rebound.   Musculoskeletal:      Cervical back: Neck supple.   Lymphadenopathy:      Cervical: No cervical adenopathy.   Skin:     General: Skin is warm and dry.   Neurological:      Mental Status: She is alert and oriented to person, place, and time.      Deep Tendon Reflexes: Abnormal reflex: .tgbadd.      Comments:   Cranial nerves III through XII grossly intact, neck is supple, Nontender Cervical spine,  Can touch chin to  chest and to both " shoulders     Psychiatric:         Behavior: Behavior normal.       Assessment:     1. Routine general medical examination at a health care facility    2. COVID-19 virus infection    3. Episodic paroxysmal hemicrania, not intractable    4. Attention deficit hyperactivity disorder (ADHD), combined type    5. Dysthymic disorder    6. Syringomyelia    7. Chiari malformation type I    8. Anxiety      Plan:        Follow with GYN for female health & cancer prevention    Due for  Vaccines  - at your pharmacy    Continue medications, stable    Follow with neurologist if symptoms flare up    ==============================  RECOMMENDATIONS FOR FEMALES  ==============================  Your #1 MEDICINE is DAILY EXERCISE - 15-20 minutes of huffing & puffing EVERY DAY.     Prevent the #1 cause of death- cardiovascular disease (HEART ATTACK & STROKE) by checking for normal blood pressure, cholesterol, sugars, & by not smoking.     VACCINES: Yearly FLU shot,   I recommend  high fiber (5 fresh fruits or vegetables daily), low fat diet and aerobic  exercise (huffing/ puffing/ sweating for 20 min straight at least 4 days a week)    Follow up yearly with mammogram, fasting lipids, CMP, CBC prior.   ==============================================================      Patient Instructions   In 6 MONTHS,  feel free to make a VIRTUAL VIDEO appointment with me for refills.      ===========================================  In 3 MONTHS, you can EMAIL me for 3 more prescriptions to send ELECTRONICALLY to your REGULAR PHARMACY    Once a YEAR (every other 6 MONTHS), I would like to see you in the office to check blood pressure & perform a physical exam.    Please let me know if you have any side effects - including palpitations, weight loss, chest pain, elevated blood pressure. Please do not increase the dosage without discussing it with me first.     If you are female & are contemplating pregnancy or become pregnant, STOP this medication. It  "can affect a fetus.     Of course if you are seen for another ailment in the meantime, we can discuss your medicine at that visit, which will suffice.     Please contact me if you have any other questions. Feel free to make your appointments online.    ============================================  OTHER TIPS FOR ADD:  ============================================    FANTASTIC BOOK TO KEEP AT YOUR BEDSIDE- ADD Success Stories by Trevor Betancourt    Try stopping one of these for a week at a time & see how your brain responds -  Cut out sugar, carbohydrates, processed foods.     Spend 5- 10 min a day making a Daily To-Do list    Try the PomodSymphogen technique (look it up) of focused work for 25 min, then take 5 min break    Check out [email or web address]  - LISTEN TO HIS PODCAST ON EATING FOR ADHD    Get restful uninterrupted SLEEP every night     Get outside & walk/ exercise at least 10 minutes EVERY day.     On your phone, under settings, turn off ALL notifications.     Keep your phone turned face down when you're studying or working or charging it,  as not to be distracted on the task in front of you.     TRY THIS & SEE HOW PRODUCTIVE YOU ARE - Under Settings, screen time, limit social media & texting to ONE HOUR a day.     Be aware of your total screen time that your spend on your phone on a daily basis. If you're trying to complete a task, be aware that you were distracted the amount of time that you were on your phone.     Avoid alcohol, marijuana, vaping, tobacco, drugs - these all hit the "reward center" of your brain but ultimately depresses your brain function  ============================================  For headaches:    MAGNESIUM oxide supplementation 400 mg at bedtime  for headache prophylaxis    RIBOFLAVIN 100 mg twice daily    Increase water intake                                  "

## 2022-02-09 DIAGNOSIS — F90.0 ATTENTION DEFICIT HYPERACTIVITY DISORDER (ADHD), PREDOMINANTLY INATTENTIVE TYPE: ICD-10-CM

## 2022-02-09 RX ORDER — DEXTROAMPHETAMINE SACCHARATE, AMPHETAMINE ASPARTATE MONOHYDRATE, DEXTROAMPHETAMINE SULFATE AND AMPHETAMINE SULFATE 5; 5; 5; 5 MG/1; MG/1; MG/1; MG/1
20 CAPSULE, EXTENDED RELEASE ORAL EVERY MORNING
Qty: 30 CAPSULE | Refills: 0 | Status: SHIPPED | OUTPATIENT
Start: 2022-02-16 | End: 2022-03-09 | Stop reason: SDUPTHER

## 2022-02-09 NOTE — TELEPHONE ENCOUNTER
No new care gaps identified.  Powered by [employer]. Reference number: [id number].   2/09/2022 9:45:56 AM CST

## 2022-02-21 ENCOUNTER — PATIENT MESSAGE (OUTPATIENT)
Dept: PRIMARY CARE CLINIC | Facility: CLINIC | Age: 37
End: 2022-02-21
Payer: MEDICAID

## 2022-02-21 PROBLEM — Z91.013 SHELLFISH ALLERGY: Status: ACTIVE | Noted: 2022-02-21

## 2022-02-21 RX ORDER — EPINEPHRINE 0.3 MG/.3ML
1 INJECTION SUBCUTANEOUS ONCE
Qty: 0.3 ML | Refills: 12 | Status: ON HOLD | OUTPATIENT
Start: 2022-02-21 | End: 2023-05-01 | Stop reason: HOSPADM

## 2022-03-14 NOTE — TELEPHONE ENCOUNTER
No new care gaps identified.  Powered by [facility] by [employer] Reference number: [id number].   3/14/2022 9:03:34 AM CDT

## 2022-03-18 RX ORDER — PROPRANOLOL HYDROCHLORIDE 10 MG/1
TABLET ORAL
Qty: 90 TABLET | Refills: 3 | Status: SHIPPED | OUTPATIENT
Start: 2022-03-18 | End: 2022-08-31

## 2022-03-18 NOTE — TELEPHONE ENCOUNTER
This Rx Request does not qualify for assessment with the ORC   Please review protocol details and the Care Due Message for extra clinical information    Reasons Rx Request may be deferred:  Indication is outside of protocol for ORC    Note composed:11:20 AM 03/18/2022

## 2022-05-05 DIAGNOSIS — F90.0 ATTENTION DEFICIT HYPERACTIVITY DISORDER (ADHD), PREDOMINANTLY INATTENTIVE TYPE: ICD-10-CM

## 2022-05-05 RX ORDER — DEXTROAMPHETAMINE SACCHARATE, AMPHETAMINE ASPARTATE MONOHYDRATE, DEXTROAMPHETAMINE SULFATE AND AMPHETAMINE SULFATE 5; 5; 5; 5 MG/1; MG/1; MG/1; MG/1
20 CAPSULE, EXTENDED RELEASE ORAL EVERY MORNING
Qty: 30 CAPSULE | Refills: 0 | Status: SHIPPED | OUTPATIENT
Start: 2022-05-05 | End: 2022-05-05 | Stop reason: SDUPTHER

## 2022-05-05 RX ORDER — DEXTROAMPHETAMINE SACCHARATE, AMPHETAMINE ASPARTATE MONOHYDRATE, DEXTROAMPHETAMINE SULFATE AND AMPHETAMINE SULFATE 5; 5; 5; 5 MG/1; MG/1; MG/1; MG/1
20 CAPSULE, EXTENDED RELEASE ORAL EVERY MORNING
Qty: 30 CAPSULE | Refills: 0 | Status: SHIPPED | OUTPATIENT
Start: 2022-05-05 | End: 2022-06-02 | Stop reason: SDUPTHER

## 2022-05-05 NOTE — TELEPHONE ENCOUNTER
No new care gaps identified.  [facility] Embedded Care Gaps. Reference number: [id number]. 5/05/2022   12:50:30 AM CDT

## 2022-06-23 RX ORDER — NORTRIPTYLINE HYDROCHLORIDE 50 MG/1
50 CAPSULE ORAL NIGHTLY
Qty: 90 CAPSULE | Refills: 2 | Status: SHIPPED | OUTPATIENT
Start: 2022-06-23 | End: 2022-11-18

## 2022-06-24 NOTE — TELEPHONE ENCOUNTER
Refill Authorization Note   Can Wright  is requesting a refill authorization.  Brief Assessment and Rationale for Refill:  Approve     Medication Therapy Plan:       Medication Reconciliation Completed: No   Comments:     No Care Gaps recommended.     Note composed:7:10 PM 06/23/2022

## 2022-06-24 NOTE — TELEPHONE ENCOUNTER
No new care gaps identified.  [facility] Embedded Care Gaps. Reference number: [id number]. 6/23/2022   7:08:35 PM CDT

## 2022-07-04 DIAGNOSIS — F90.0 ATTENTION DEFICIT HYPERACTIVITY DISORDER (ADHD), PREDOMINANTLY INATTENTIVE TYPE: ICD-10-CM

## 2022-07-04 NOTE — TELEPHONE ENCOUNTER
No new care gaps identified.  [facility] Embedded Care Gaps. Reference number: [id number]. 7/04/2022   9:20:39 AM YOSELINT

## 2022-07-05 RX ORDER — DEXTROAMPHETAMINE SACCHARATE, AMPHETAMINE ASPARTATE MONOHYDRATE, DEXTROAMPHETAMINE SULFATE AND AMPHETAMINE SULFATE 5; 5; 5; 5 MG/1; MG/1; MG/1; MG/1
20 CAPSULE, EXTENDED RELEASE ORAL EVERY MORNING
Qty: 30 CAPSULE | Refills: 0 | Status: SHIPPED | OUTPATIENT
Start: 2022-07-05 | End: 2022-07-28 | Stop reason: SDUPTHER

## 2022-07-05 NOTE — TELEPHONE ENCOUNTER
Overdue for ADD appt every 6 months  Can offer  VIRTUAL appt at the 6 months point (take BP prior to visit so I can enter it into chart)

## 2022-07-06 NOTE — TELEPHONE ENCOUNTER
My chart message sent to patient your refill request for Adderall was approved. You are due for ADD appointment  every 6 months   can offer virtual  appointment  at the 6 months point (take BP prior to visit so it can be enter into  the chart). Contact the clinic to schedule [phone].

## 2022-07-09 RX ORDER — ESCITALOPRAM OXALATE 20 MG/1
TABLET ORAL
Qty: 90 TABLET | Refills: 1 | Status: SHIPPED | OUTPATIENT
Start: 2022-07-09 | End: 2023-01-03

## 2022-07-09 NOTE — TELEPHONE ENCOUNTER
No new care gaps identified.  [facility] Embedded Care Gaps. Reference number: [id number]. 7/09/2022   12:02:47 AM DARYL

## 2022-07-09 NOTE — TELEPHONE ENCOUNTER
Refill Decision Note   Can Wright  is requesting a refill authorization.  Brief Assessment and Rationale for Refill:  Approve     Medication Therapy Plan:       Medication Reconciliation Completed: No   Comments:     No Care Gaps recommended.     Note composed:6:12 PM 07/09/2022

## 2022-07-25 ENCOUNTER — PATIENT MESSAGE (OUTPATIENT)
Dept: PRIMARY CARE CLINIC | Facility: CLINIC | Age: 37
End: 2022-07-25
Payer: MEDICAID

## 2022-07-25 RX ORDER — METHOCARBAMOL 750 MG/1
750 TABLET, FILM COATED ORAL 3 TIMES DAILY
Qty: 30 TABLET | Refills: 0 | Status: SHIPPED | OUTPATIENT
Start: 2022-07-25 | End: 2022-08-04

## 2022-07-28 ENCOUNTER — OFFICE VISIT (OUTPATIENT)
Dept: PRIMARY CARE CLINIC | Facility: CLINIC | Age: 37
End: 2022-07-28
Payer: MEDICAID

## 2022-07-28 DIAGNOSIS — F90.0 ATTENTION DEFICIT HYPERACTIVITY DISORDER (ADHD), PREDOMINANTLY INATTENTIVE TYPE: ICD-10-CM

## 2022-07-28 PROCEDURE — 99213 PR OFFICE/OUTPT VISIT, EST, LEVL III, 20-29 MIN: ICD-10-PCS | Mod: 95,,, | Performed by: FAMILY MEDICINE

## 2022-07-28 PROCEDURE — 1160F RVW MEDS BY RX/DR IN RCRD: CPT | Mod: CPTII,95,, | Performed by: FAMILY MEDICINE

## 2022-07-28 PROCEDURE — 99213 OFFICE O/P EST LOW 20 MIN: CPT | Mod: 95,,, | Performed by: FAMILY MEDICINE

## 2022-07-28 PROCEDURE — 1159F MED LIST DOCD IN RCRD: CPT | Mod: CPTII,95,, | Performed by: FAMILY MEDICINE

## 2022-07-28 PROCEDURE — 1159F PR MEDICATION LIST DOCUMENTED IN MEDICAL RECORD: ICD-10-PCS | Mod: CPTII,95,, | Performed by: FAMILY MEDICINE

## 2022-07-28 PROCEDURE — 1160F PR REVIEW ALL MEDS BY PRESCRIBER/CLIN PHARMACIST DOCUMENTED: ICD-10-PCS | Mod: CPTII,95,, | Performed by: FAMILY MEDICINE

## 2022-07-28 RX ORDER — DEXTROAMPHETAMINE SACCHARATE, AMPHETAMINE ASPARTATE MONOHYDRATE, DEXTROAMPHETAMINE SULFATE AND AMPHETAMINE SULFATE 5; 5; 5; 5 MG/1; MG/1; MG/1; MG/1
20 CAPSULE, EXTENDED RELEASE ORAL EVERY MORNING
Qty: 30 CAPSULE | Refills: 0 | Status: SHIPPED | OUTPATIENT
Start: 2022-10-05 | End: 2022-11-18

## 2022-07-28 RX ORDER — DEXTROAMPHETAMINE SACCHARATE, AMPHETAMINE ASPARTATE MONOHYDRATE, DEXTROAMPHETAMINE SULFATE AND AMPHETAMINE SULFATE 5; 5; 5; 5 MG/1; MG/1; MG/1; MG/1
20 CAPSULE, EXTENDED RELEASE ORAL EVERY MORNING
Qty: 30 CAPSULE | Refills: 0 | Status: SHIPPED | OUTPATIENT
Start: 2022-09-05 | End: 2022-11-18

## 2022-07-28 RX ORDER — DEXTROAMPHETAMINE SACCHARATE, AMPHETAMINE ASPARTATE MONOHYDRATE, DEXTROAMPHETAMINE SULFATE AND AMPHETAMINE SULFATE 5; 5; 5; 5 MG/1; MG/1; MG/1; MG/1
20 CAPSULE, EXTENDED RELEASE ORAL EVERY MORNING
Qty: 30 CAPSULE | Refills: 0 | Status: SHIPPED | OUTPATIENT
Start: 2022-08-05 | End: 2022-11-18

## 2022-07-28 NOTE — PROGRESS NOTES
Subjective:      Patient ID: Can Wright is a 36 y.o. female.    Chief Complaint: No chief complaint on file.    The patient location is: home  The chief complaint leading to consultation is: ADD med    Visit type: audiovisual    Face to Face time with patient:   20 minutes of total time spent on the encounter, which includes face to face time and non-face to face time preparing to see the patient (eg, review of tests), Obtaining and/or reviewing separately obtained history, Documenting clinical information in the electronic or other health record, Independently interpreting results (not separately reported) and communicating results to the patient/family/caregiver, or Care coordination (not separately reported).         Each patient to whom he or she provides medical services by telemedicine is:  (1) informed of the relationship between the physician and patient and the respective role of any other health care provider with respect to management of the patient; and (2) notified that he or she may decline to receive medical services by telemedicine and may withdraw from such care at any time.    Notes:   Presents today to discuss ADD medication  - Adderall XR 20 qd    It works well for focus, concentration, completing tasks.    Also taking Lexapro 20 mg daily    Denies any side effects of palpitations, chest pain, shortness of breath, weight loss, decreased appetite, sweating    Robaxin helped with low back pain, sciatica when moving.     Current Outpatient Medications   Medication Instructions     dextroamphetamine-amphetamine (ADDERALL XR) 20 MG 24 hr capsule 20 mg, Oral, Every morning              EPINEPHrine (EPIPEN) 0.3 mg, Intramuscular, Once    EScitalopram oxalate (LEXAPRO) 20 MG tablet TAKE 1 TABLET BY MOUTH EVERY DAY    fluticasone propionate (FLONASE) 50 mcg/actuation nasal spray INSTILL ONE SPRAY INTO EACH NOSTRIL ONCE DAILY    gabapentin (NEURONTIN) 400 mg, Oral, 3 times daily     methocarbamoL (ROBAXIN) 750 mg, Oral, 3 times daily    norethindrone-ethinyl estradiol (JUNEL FE 1/20) 1 mg-20 mcg (21)/75 mg (7) per tablet No dose, route, or frequency recorded.    nortriptyline (PAMELOR) 50 mg, Oral, Nightly    propranoloL (INDERAL) 10 MG tablet TAKE 1 TABLET BY MOUTH TWICE A DAY AS NEEDED FOR ANXIETY       No results found for: HGBA1C  No results found for: MICALBCREAT  Lab Results   Component Value Date    LDLCALC 121.0 12/13/2021    LDLCALC 133.4 08/14/2020    CHOL 226 (H) 12/13/2021    HDL 47 12/13/2021    TRIG 290 (H) 12/13/2021       Lab Results   Component Value Date     12/13/2021    K 4.3 12/13/2021     12/13/2021    CO2 21 (L) 12/13/2021    GLU 86 12/13/2021    BUN 12 12/13/2021    CREATININE 0.8 12/13/2021    CALCIUM 9.1 12/13/2021    PROT 7.3 12/13/2021    ALBUMIN 3.2 (L) 12/13/2021    BILITOT 0.4 12/13/2021    ALKPHOS 66 12/13/2021    AST 14 12/13/2021    ALT 11 12/13/2021    ANIONGAP 13 12/13/2021    ESTGFRAFRICA >60.0 12/13/2021    EGFRNONAA >60.0 12/13/2021    WBC 11.84 12/13/2021    HGB 13.0 12/13/2021    HGB 13.6 08/14/2020    HCT 42.2 12/13/2021    MCV 89 12/13/2021     12/13/2021    TSH 2.430 12/13/2021    HEPCAB Negative 08/14/2020       No results found for: LH, FSH, TOTALTESTOST, PROGESTERONE, ESTRADIOL, MIKLGHCF29KT, EZRPHBBN23, FERRITIN, IRON, TRANSFERRIN, TIBC, FESATURATED, ZINC      Past Medical History:   Diagnosis Date    ADD (attention deficit disorder)     Allergy     Anxiety     , nortriptyline 50 mg nightly, Lexapro 20 q.d., propranolol 10 mg prn    Attention deficit hyperactivity disorder (ADHD), combined type 04/24/2014    Carpal tunnel syndrome on both sides 2012    Neurontin 400 mg 3 times a day for carpal tunnel & arthritis. Bilateral injections Dr Talbert, Celebrex expensive    Chiari I malformation     Dysthymic disorder     lexapro    Episodic paroxysmal hemicrania, not intractable 01/27/2022    Hyperlipidemia     Shellfish  allergy     epi pen    Syringomyelia     asymptomatic, released from Dr Lyon, steroids worked initially, tx Celebrex, Neurontin, Tyl #3 (helps prn), pamelor, Dr Gracia recommended surgery     History reviewed. No pertinent surgical history.  Social History     Social History Narrative    , boyfriend works Security has 10 yo dtr    lives with mom - who has Fibromyalgia & macular degeneration, (dad passed young) single, no children, nosmoker ETOH 1-2 x / mo    GYN Noah     Family History   Problem Relation Age of Onset    Hyperlipidemia Mother     Fibromyalgia Mother     Arthritis Mother     Depression Father     Arthritis Maternal Grandmother     Heart disease Maternal Grandfather     Cancer Maternal Uncle         liver     There were no vitals filed for this visit.  Objective:   Physical Exam  Assessment:     1. Attention deficit hyperactivity disorder (ADHD), predominantly inattentive type      Plan:     Orders Placed This Encounter    dextroamphetamine-amphetamine (ADDERALL XR) 20 MG 24 hr capsule    dextroamphetamine-amphetamine (ADDERALL XR) 20 MG 24 hr capsule    dextroamphetamine-amphetamine (ADDERALL XR) 20 MG 24 hr capsule     Continue other meds    In 6 MONTHS,  feel free to make a VIRTUAL VIDEO appointment with me for refills.      ===========================================  In 3 MONTHS, you can EMAIL me for 3 more prescriptions to send ELECTRONICALLY to your REGULAR PHARMACY    Once a YEAR (every other 6 MONTHS), I would like to see you in the office to check blood pressure & perform a physical exam.    Please let me know if you have any side effects - including palpitations, weight loss, chest pain, elevated blood pressure. Please do not increase the dosage without discussing it with me first.     If you are female & are contemplating pregnancy or become pregnant, STOP this medication. It can affect a fetus.     Of course if you are seen for another ailment in the meantime, we  "can discuss your medicine at that visit, which will suffice.     Please contact me if you have any other questions. Feel free to make your appointments online.    ============================================  OTHER TIPS FOR ADD:  ============================================    FANTASTIC BOOK TO KEEP AT YOUR BEDSIDE= ADD Success Stories by Trevor Betancourt    Try stopping one of these for a week at a time & see how your brain responds -  Cut out sugar, carbohydrates, processed foods.     Spend 5- 10 min a day making a Daily To-Do list    Try the Pomodoro technique (look it up) of focused work for 25 min, then take 5 min break    Check out [email or web address]  - LISTEN TO HIS PODCAST ON EATING FOR ADHD    Get restful uninterrupted SLEEP every night     Get outside & walk/ exercise at least 10 minutes EVERY day.     On your phone, under settings, turn off ALL notifications.     Keep your phone turned face down when you're studying or working or charging it,  as not to be distracted on the task in front of you.     TRY THIS & SEE HOW PRODUCTIVE YOU ARE - Under Settings, screen time, limit social media & texting to ONE HOUR a day.     Be aware of your total screen time that your spend on your phone on a daily basis. If you're trying to complete a task, be aware that you were distracted the amount of time that you were on your phone.     Avoid alcohol, marijuana, vaping, tobacco, drugs - these all hit the "reward center" of your brain but ultimately depresses your brain function  ============================================    TELEMED  There are no Patient Instructions on file for this visit.                            Answers for HPI/ROS submitted by the patient on 7/28/2022  activity change: No  unexpected weight change: No  neck pain: No  hearing loss: No  rhinorrhea: No  trouble swallowing: No  eye discharge: No  visual disturbance: No  chest tightness: No  wheezing: No  chest pain: No  palpitations: No  blood in stool: " No  constipation: No  vomiting: No  diarrhea: No  polydipsia: No  polyuria: No  difficulty urinating: No  hematuria: No  menstrual problem: No  dysuria: No  joint swelling: No  arthralgias: No  headaches: No  weakness: No  confusion: No  dysphoric mood: No

## 2022-10-10 ENCOUNTER — TELEPHONE (OUTPATIENT)
Dept: OBSTETRICS AND GYNECOLOGY | Facility: CLINIC | Age: 37
End: 2022-10-10
Payer: MEDICAID

## 2022-10-10 DIAGNOSIS — Z34.90 PREGNANCY: Primary | ICD-10-CM

## 2022-10-18 ENCOUNTER — HOSPITAL ENCOUNTER (OUTPATIENT)
Dept: PERINATAL CARE | Facility: OTHER | Age: 37
Discharge: HOME | End: 2022-10-18
Attending: OBSTETRICS & GYNECOLOGY
Payer: MEDICAID

## 2022-10-18 ENCOUNTER — OFFICE VISIT (OUTPATIENT)
Dept: OBSTETRICS AND GYNECOLOGY | Facility: CLINIC | Age: 37
End: 2022-10-18
Payer: MEDICAID

## 2022-10-18 VITALS
BODY MASS INDEX: 37 KG/M2 | SYSTOLIC BLOOD PRESSURE: 128 MMHG | HEIGHT: 61 IN | DIASTOLIC BLOOD PRESSURE: 88 MMHG | WEIGHT: 196 LBS

## 2022-10-18 DIAGNOSIS — Z34.90 PREGNANCY: ICD-10-CM

## 2022-10-18 DIAGNOSIS — Z34.90 PREGNANCY, UNSPECIFIED GESTATIONAL AGE: Primary | ICD-10-CM

## 2022-10-18 PROCEDURE — 76801 OB US < 14 WKS SINGLE FETUS: CPT

## 2022-10-18 PROCEDURE — 87186 SC STD MICRODIL/AGAR DIL: CPT | Performed by: ADVANCED PRACTICE MIDWIFE

## 2022-10-18 PROCEDURE — 87491 CHLMYD TRACH DNA AMP PROBE: CPT | Performed by: ADVANCED PRACTICE MIDWIFE

## 2022-10-18 PROCEDURE — 1159F MED LIST DOCD IN RCRD: CPT | Mod: CPTII,,, | Performed by: ADVANCED PRACTICE MIDWIFE

## 2022-10-18 PROCEDURE — 99213 OFFICE O/P EST LOW 20 MIN: CPT | Mod: PBBFAC,25,TH | Performed by: ADVANCED PRACTICE MIDWIFE

## 2022-10-18 PROCEDURE — 76801 OB US < 14 WKS SINGLE FETUS: CPT | Mod: 26,,, | Performed by: OBSTETRICS & GYNECOLOGY

## 2022-10-18 PROCEDURE — 3079F DIAST BP 80-89 MM HG: CPT | Mod: CPTII,,, | Performed by: ADVANCED PRACTICE MIDWIFE

## 2022-10-18 PROCEDURE — 76801 US OB/GYN PROCEDURE (VIEWPOINT): ICD-10-PCS | Mod: 26,,, | Performed by: OBSTETRICS & GYNECOLOGY

## 2022-10-18 PROCEDURE — 87077 CULTURE AEROBIC IDENTIFY: CPT | Performed by: ADVANCED PRACTICE MIDWIFE

## 2022-10-18 PROCEDURE — 3079F PR MOST RECENT DIASTOLIC BLOOD PRESSURE 80-89 MM HG: ICD-10-PCS | Mod: CPTII,,, | Performed by: ADVANCED PRACTICE MIDWIFE

## 2022-10-18 PROCEDURE — 99999 PR PBB SHADOW E&M-EST. PATIENT-LVL III: CPT | Mod: PBBFAC,,, | Performed by: ADVANCED PRACTICE MIDWIFE

## 2022-10-18 PROCEDURE — 1159F PR MEDICATION LIST DOCUMENTED IN MEDICAL RECORD: ICD-10-PCS | Mod: CPTII,,, | Performed by: ADVANCED PRACTICE MIDWIFE

## 2022-10-18 PROCEDURE — 99204 OFFICE O/P NEW MOD 45 MIN: CPT | Mod: S$PBB,TH,, | Performed by: ADVANCED PRACTICE MIDWIFE

## 2022-10-18 PROCEDURE — 99999 PR PBB SHADOW E&M-EST. PATIENT-LVL III: ICD-10-PCS | Mod: PBBFAC,,, | Performed by: ADVANCED PRACTICE MIDWIFE

## 2022-10-18 PROCEDURE — 99204 PR OFFICE/OUTPT VISIT, NEW, LEVL IV, 45-59 MIN: ICD-10-PCS | Mod: S$PBB,TH,, | Performed by: ADVANCED PRACTICE MIDWIFE

## 2022-10-18 PROCEDURE — 87088 URINE BACTERIA CULTURE: CPT | Performed by: ADVANCED PRACTICE MIDWIFE

## 2022-10-18 PROCEDURE — 3074F PR MOST RECENT SYSTOLIC BLOOD PRESSURE < 130 MM HG: ICD-10-PCS | Mod: CPTII,,, | Performed by: ADVANCED PRACTICE MIDWIFE

## 2022-10-18 PROCEDURE — 3074F SYST BP LT 130 MM HG: CPT | Mod: CPTII,,, | Performed by: ADVANCED PRACTICE MIDWIFE

## 2022-10-18 PROCEDURE — 87086 URINE CULTURE/COLONY COUNT: CPT | Performed by: ADVANCED PRACTICE MIDWIFE

## 2022-10-18 PROCEDURE — 87591 N.GONORRHOEAE DNA AMP PROB: CPT | Performed by: ADVANCED PRACTICE MIDWIFE

## 2022-10-18 NOTE — PROGRESS NOTES
HISTORY OF PRESENT ILLNESS:    Can Wright is a 37 y.o. female, ,  Patient's last menstrual period was 2022.  for a routine exam complaining of amenorrhea.    Dating scan today with EGA 15lai1c, FAWAD 23    Past Medical History:   Diagnosis Date    ADD (attention deficit disorder)     Allergy     Anxiety     , nortriptyline 50 mg nightly, Lexapro 20 q.d., propranolol 10 mg prn    Attention deficit hyperactivity disorder (ADHD), combined type 2014    Carpal tunnel syndrome on both sides     Neurontin 400 mg 3 times a day for carpal tunnel & arthritis. Bilateral injections Dr Talbert, Celebrex expensive    Chiari I malformation     Dysthymic disorder     lexapro    Episodic paroxysmal hemicrania, not intractable 2022    Hyperlipidemia     Shellfish allergy     epi pen    Syringomyelia     asymptomatic, released from Dr Lyon, steroids worked initially, tx Celebrex, Neurontin, Tyl #3 (helps prn), pamarpita, Dr Gracia recommended surgery       History reviewed. No pertinent surgical history.    MEDICATIONS AND ALLERGIES:      Current Outpatient Medications:     EScitalopram oxalate (LEXAPRO) 20 MG tablet, TAKE 1 TABLET BY MOUTH EVERY DAY, Disp: 90 tablet, Rfl: 1    fluticasone propionate (FLONASE) 50 mcg/actuation nasal spray, INSTILL ONE SPRAY INTO EACH NOSTRIL ONCE DAILY, Disp: 48 mL, Rfl: 3    gabapentin (NEURONTIN) 400 MG capsule, Take 1 capsule (400 mg total) by mouth 3 (three) times daily., Disp: 90 capsule, Rfl: 11    nortriptyline (PAMELOR) 50 MG capsule, Take 1 capsule (50 mg total) by mouth every evening., Disp: 90 capsule, Rfl: 2    propranoloL (INDERAL) 10 MG tablet, TAKE 1 TABLET BY MOUTH TWICE A DAY AS NEEDED FOR ANXIETY, Disp: 60 tablet, Rfl: 1    dextroamphetamine-amphetamine (ADDERALL XR) 20 MG 24 hr capsule, Take 1 capsule (20 mg total) by mouth every morning. (Patient not taking: Reported on 10/18/2022), Disp: 30 capsule, Rfl: 0     dextroamphetamine-amphetamine (ADDERALL XR) 20 MG 24 hr capsule, Take 1 capsule (20 mg total) by mouth every morning. (Patient not taking: Reported on 10/18/2022), Disp: 30 capsule, Rfl: 0    dextroamphetamine-amphetamine (ADDERALL XR) 20 MG 24 hr capsule, Take 1 capsule (20 mg total) by mouth every morning. (Patient not taking: Reported on 10/18/2022), Disp: 30 capsule, Rfl: 0    EPINEPHrine (EPIPEN) 0.3 mg/0.3 mL AtIn, Inject 0.3 mLs (0.3 mg total) into the muscle once. for 1 dose, Disp: 0.3 mL, Rfl: 12    norethindrone-ethinyl estradiol (JUNEL FE 1/20) 1 mg-20 mcg (21)/75 mg (7) per tablet, , Disp: , Rfl:     Review of patient's allergies indicates:   Allergen Reactions    Wellbutrin [bupropion hcl] Anxiety     Worsened anxiety    Amoxicillin trihydrate      Other reaction(s): severe stomach pain    Aspirin      Other reaction(s): gli upset    Augmentin [amoxicillin-pot clavulanate] Nausea Only    Banana extract      Other reaction(s): swelling, itchy lips    Cat hair standardized allergenic extract      Other reaction(s): itchy eyes    Corn syrup      Other reaction(s): Unknown    Pollen extracts      Other reaction(s): sneezing, stuffy nose    Potassium clavulanate      Other reaction(s): severe stomach pain    Rice      Other reaction(s): Unknown    Shellfish derived      Other reaction(s): Anaphylaxis    Diamond      Other reaction(s): itchy throat       Family History   Problem Relation Age of Onset    Hyperlipidemia Mother     Fibromyalgia Mother     Arthritis Mother     Depression Father     Arthritis Maternal Grandmother     Heart disease Maternal Grandfather     Cancer Maternal Uncle         liver       Social History     Socioeconomic History    Marital status: Single   Tobacco Use    Smoking status: Former     Packs/day: 0.50     Years: 7.00     Pack years: 3.50     Types: Cigarettes     Start date: 6/2/2016    Smokeless tobacco: Never   Substance and Sexual Activity    Alcohol use: Yes      "Alcohol/week: 1.0 standard drink     Types: 1 Cans of beer per week    Drug use: No    Sexual activity: Yes     Birth control/protection: None       COMPREHENSIVE GYN HISTORY:  PAP History: Denies abnormal Paps.  Infection History: Denies STDs. Denies PID.  Benign History: Denies uterine fibroids. Denies ovarian cysts. Denies endometriosis. Denies other conditions.  Cancer History: Denies cervical cancer. Denies uterine cancer or hyperplasia. Denies ovarian cancer. Denies vulvar cancer or pre-cancer. Denies vaginal cancer or pre-cancer. Denies breast cancer. Denies colon cancer.  Sexual Activity History: Reports currently being sexually active  Menstrual History: None.  Contraception: None    ROS:  GENERAL: No weight changes. No swelling. No fatigue. No fever.  CARDIOVASCULAR: No chest pain. No shortness of breath. No leg cramps.   NEUROLOGICAL: No headaches. No vision changes.  BREASTS: No pain. No lumps. No discharge.  ABDOMEN: No pain. No nausea. No vomiting. No diarrhea. No constipation.  REPRODUCTIVE: No abnormal bleeding.   VULVA: No pain. No lesions. No itching.  VAGINA: No relaxation. No itching. No odor. No discharge. No lesions.  URINARY: No incontinence. No nocturia. No frequency. No dysuria.    /88   Ht 5' 1" (1.549 m)   Wt 88.9 kg (195 lb 15.8 oz)   LMP 08/01/2022   BMI 37.03 kg/m²     PE:  AFFECT: Alert and oriented X 3. Interactive during exam  GENERAL: Appearance well-nourished, well-developed, in no acute distress.  HEENT: WNL  TEETH: Good dentition.  LUNGS: Easy and unlabored  HEART: Regular rate and rhythm   ABDOMEN: Soft and nontender without masses or organomegally.  EXTREMITIES: No cyanosis, clubbing or edema.  SKIN: No lesions or rashes.    PROCEDURES:  UPT Positive      DIAGNOSIS:  Gyn exam  IUP with stated LMP of    PLAN:Routine prenatal care    MEDICATIONS PRESCRIBED:  PNV    LABS AND TESTS ORDERED:  New Ob Labs      NEW PREGNANCY COUNSELING  Patient was counseled today on:  - " Routine prenatal blood tests including HIV and anticipated course of prenatal care  - Prenatal vitamins and folic acid  - Weight gain, nutrition, and exercise  - Seafood and mercury  - Properly heating deli and prepared meats and avoiding unrefrigerated deli  meats, cheeses, and milk products,   - Avoiding cat litter and raw meats due to risk of Toxoplasmosis precautions   - Accuracy of the LMP-based FAWAD and the value of an early TV-u/s  - Aneuploidy and neural tube screening -- cffDNA, sequential screening, and AFP screen at 15 weeks  - OTC medication in the first trimester  - Harmful effects of smoking, etOH, and recreational drugs  - MFM u/s  at 18-20 weeks.  - Common complaints of pregnancy  - Seat belt use  - Childbirth classes and hospital facilities  - All questions were answered    TERATOLOGY COUNSELING:   Discussed indications and options for aneuploidy screening - pamphlets given and information to check cost of Materna 21     Reviewed and discussed all current medications- advised pt will consult  with Dr. Jeansonne re possible changes in medications.    - Pain and bleeding precautions given    - Return to clinic in 4 weeks    Mere Lacy NP    OB/GYN    Total time of 30 minutes, including face-to-face time and non-face-to-face time preparing to see the patient (eg, review of tests), obtaining and/or reviewing separately obtained history, documenting clinical information in the electronic or other health record, independently interpreting results, communicating results to the patient/family/caregiver, or care coordination.

## 2022-10-19 LAB
C TRACH DNA SPEC QL NAA+PROBE: NOT DETECTED
N GONORRHOEA DNA SPEC QL NAA+PROBE: NOT DETECTED

## 2022-10-20 ENCOUNTER — PATIENT MESSAGE (OUTPATIENT)
Dept: OBSTETRICS AND GYNECOLOGY | Facility: CLINIC | Age: 37
End: 2022-10-20
Payer: MEDICAID

## 2022-10-20 ENCOUNTER — TELEPHONE (OUTPATIENT)
Dept: OBSTETRICS AND GYNECOLOGY | Facility: CLINIC | Age: 37
End: 2022-10-20
Payer: MEDICAID

## 2022-10-20 LAB — BACTERIA UR CULT: ABNORMAL

## 2022-10-20 NOTE — TELEPHONE ENCOUNTER
Left VM that per Dr. Jeansonne it is OK to continue on her lexapro at her prescribed dose. Advised pt to call back if any questions or concerns.

## 2022-11-10 ENCOUNTER — PATIENT MESSAGE (OUTPATIENT)
Dept: OBSTETRICS AND GYNECOLOGY | Facility: CLINIC | Age: 37
End: 2022-11-10
Payer: MEDICAID

## 2022-11-18 ENCOUNTER — INITIAL PRENATAL (OUTPATIENT)
Dept: OBSTETRICS AND GYNECOLOGY | Facility: CLINIC | Age: 37
End: 2022-11-18
Payer: MEDICAID

## 2022-11-18 ENCOUNTER — PATIENT MESSAGE (OUTPATIENT)
Dept: ADMINISTRATIVE | Facility: OTHER | Age: 37
End: 2022-11-18
Payer: MEDICAID

## 2022-11-18 VITALS
WEIGHT: 196.63 LBS | DIASTOLIC BLOOD PRESSURE: 76 MMHG | SYSTOLIC BLOOD PRESSURE: 126 MMHG | BODY MASS INDEX: 37.16 KG/M2

## 2022-11-18 DIAGNOSIS — Z36.89 ENCOUNTER FOR FETAL ANATOMIC SURVEY: ICD-10-CM

## 2022-11-18 DIAGNOSIS — Z3A.15 15 WEEKS GESTATION OF PREGNANCY: Primary | ICD-10-CM

## 2022-11-18 PROCEDURE — 99213 PR OFFICE/OUTPT VISIT, EST, LEVL III, 20-29 MIN: ICD-10-PCS | Mod: TH,S$PBB,,

## 2022-11-18 PROCEDURE — 99999 PR PBB SHADOW E&M-EST. PATIENT-LVL III: ICD-10-PCS | Mod: PBBFAC,,,

## 2022-11-18 PROCEDURE — 99999 PR PBB SHADOW E&M-EST. PATIENT-LVL III: CPT | Mod: PBBFAC,,,

## 2022-11-18 PROCEDURE — 99213 OFFICE O/P EST LOW 20 MIN: CPT | Mod: TH,S$PBB,,

## 2022-11-18 PROCEDURE — 99213 OFFICE O/P EST LOW 20 MIN: CPT | Mod: PBBFAC,TH

## 2022-11-18 NOTE — PATIENT INSTRUCTIONS
[facility] DEL[facility]ERY PHONE NUMBER, [phone] (OPEN 24/7, LOCATED ON 6TH FLOOR OF HOSPITAL)  [address] PHONE NUMBER, [phone] (OPEN MON-FRI, 8a-5p)

## 2022-11-18 NOTE — PROGRESS NOTES
Here for initial OB appt at 15w4d. Reports bleeding gums. Discussed using sensitive toothpaste. Asking about Gabapentin, not currently taking. Also reports hx of chiari malformation type 1, no symptoms. Denies VB and cramping. Pain, bleeding, and PTL precautions given.    - getting MT21 today   - anatomy scan ordered   - connected mom ordered   F/U scheduled 4 weeks with Dr. Jeansonne

## 2022-11-22 ENCOUNTER — PATIENT MESSAGE (OUTPATIENT)
Dept: OBSTETRICS AND GYNECOLOGY | Facility: CLINIC | Age: 37
End: 2022-11-22
Payer: MEDICAID

## 2022-11-22 ENCOUNTER — PATIENT MESSAGE (OUTPATIENT)
Dept: MATERNAL FETAL MEDICINE | Facility: CLINIC | Age: 37
End: 2022-11-22
Payer: MEDICAID

## 2022-11-22 DIAGNOSIS — G93.5 CHIARI MALFORMATION TYPE I: Primary | ICD-10-CM

## 2022-11-22 DIAGNOSIS — O09.891 MEDICATION EXPOSURE DURING FIRST TRIMESTER OF PREGNANCY: ICD-10-CM

## 2022-11-22 RX ORDER — FOLIC ACID 1 MG/1
4 TABLET ORAL DAILY
Qty: 100 TABLET | Refills: 3 | Status: SHIPPED | OUTPATIENT
Start: 2022-11-22 | End: 2023-03-07

## 2022-11-23 ENCOUNTER — PATIENT MESSAGE (OUTPATIENT)
Dept: MATERNAL FETAL MEDICINE | Facility: CLINIC | Age: 37
End: 2022-11-23
Payer: MEDICAID

## 2022-11-25 ENCOUNTER — TELEPHONE (OUTPATIENT)
Dept: OBSTETRICS AND GYNECOLOGY | Facility: CLINIC | Age: 37
End: 2022-11-25
Payer: MEDICAID

## 2022-11-25 NOTE — TELEPHONE ENCOUNTER
Spoke with pt in regards to mt21 results. Pt wants gender to be a surprise spoke with her mom with gender. Pt v/u

## 2022-11-30 ENCOUNTER — PATIENT MESSAGE (OUTPATIENT)
Dept: MATERNAL FETAL MEDICINE | Facility: CLINIC | Age: 37
End: 2022-11-30
Payer: MEDICAID

## 2022-11-30 ENCOUNTER — PATIENT MESSAGE (OUTPATIENT)
Dept: OBSTETRICS AND GYNECOLOGY | Facility: CLINIC | Age: 37
End: 2022-11-30
Payer: MEDICAID

## 2022-12-19 ENCOUNTER — PATIENT MESSAGE (OUTPATIENT)
Dept: MATERNAL FETAL MEDICINE | Facility: CLINIC | Age: 37
End: 2022-12-19
Payer: MEDICAID

## 2022-12-19 PROBLEM — O09.891 MEDICATION EXPOSURE DURING FIRST TRIMESTER OF PREGNANCY: Status: ACTIVE | Noted: 2022-12-19

## 2022-12-19 NOTE — ASSESSMENT & PLAN NOTE
Patient taking adderall?     Reviewed that there is limited available evidence for determining risk of Adderall administration in pregnancy. Discussed that these medications can be continued if potential maternal benefits outweigh the potential fetal risk.     Available data from published epidemiologic studies and postmarketing repots of amphetamine use in pregnant women have not determined a drug-associated risk for major birth defects and miscarriage; however, due to decreasing placental perfusion from amphetamine-associated vasoconstriction and stimulation of uterine contractions, an increased risk of premature delivery and low birth weight have been reported in infants born to mothers with an amphetamine dependence. In addition, vasoconstriction of the placental blood vessels may increase the risk for intrauterine growth restriction. Withdrawal symptoms, including dysphoria, agitation, and lassitude, may occur in these infants

## 2022-12-19 NOTE — ASSESSMENT & PLAN NOTE
Patient was counseled regarding Chiari malformation and how it pertains to pregnancy.     Despite concern for increased maternal complications in this patient population, there are very few reports of serious complications or poor outcomes.  In asymptomatic patients, it is generally felt safe to proceed with vaginal delivery. Decompression prior to delivery is typically only considered for patients with significant symptoms. Ms. Wright denies a history of decompression surgery.     Patient currently asymptomatic. She states that after continued evaluation, providers felt her initial symptoms were due to stress and anxiety. She did not need any surgical or medical intervention and has not followed up with Neurosurgery.     There is a concern with increasing intracranial pressure throughout pregnancy, especially during labor and pushing/valsalva.  Given the malformation, there is also special consideration when it comes to anesthesia during labor and delivery.    Recommend updated imaging and Neurosurgery consultation in pregnancy to further guide delivery recommendations.     Recommendations:  · Primary OB provider to refer patient to Neurosurgery to establish care and for further recommendations moving forward during pregnancy.  · Would recommend patient have an anesthesia consultation in early 3rd trimester for labor and delivery planning.  · Neuroimaging per Neurosurgery.

## 2022-12-20 ENCOUNTER — PROCEDURE VISIT (OUTPATIENT)
Dept: MATERNAL FETAL MEDICINE | Facility: CLINIC | Age: 37
End: 2022-12-20
Payer: MEDICAID

## 2022-12-20 ENCOUNTER — LAB VISIT (OUTPATIENT)
Dept: LAB | Facility: HOSPITAL | Age: 37
End: 2022-12-20
Attending: OBSTETRICS & GYNECOLOGY
Payer: MEDICAID

## 2022-12-20 ENCOUNTER — OFFICE VISIT (OUTPATIENT)
Dept: MATERNAL FETAL MEDICINE | Facility: CLINIC | Age: 37
End: 2022-12-20
Payer: MEDICAID

## 2022-12-20 ENCOUNTER — RESEARCH ENCOUNTER (OUTPATIENT)
Dept: RESEARCH | Facility: OTHER | Age: 37
End: 2022-12-20
Payer: MEDICAID

## 2022-12-20 ENCOUNTER — ROUTINE PRENATAL (OUTPATIENT)
Dept: OBSTETRICS AND GYNECOLOGY | Facility: CLINIC | Age: 37
End: 2022-12-20
Payer: MEDICAID

## 2022-12-20 VITALS
SYSTOLIC BLOOD PRESSURE: 134 MMHG | WEIGHT: 193.81 LBS | DIASTOLIC BLOOD PRESSURE: 84 MMHG | BODY MASS INDEX: 36.62 KG/M2

## 2022-12-20 VITALS
WEIGHT: 194.88 LBS | BODY MASS INDEX: 36.8 KG/M2 | SYSTOLIC BLOOD PRESSURE: 125 MMHG | DIASTOLIC BLOOD PRESSURE: 76 MMHG | HEIGHT: 61 IN

## 2022-12-20 DIAGNOSIS — Z36.89 ENCOUNTER FOR FETAL ANATOMIC SURVEY: ICD-10-CM

## 2022-12-20 DIAGNOSIS — G93.5 CHIARI MALFORMATION TYPE I: ICD-10-CM

## 2022-12-20 DIAGNOSIS — O09.522 AMA (ADVANCED MATERNAL AGE) MULTIGRAVIDA 35+, SECOND TRIMESTER: ICD-10-CM

## 2022-12-20 DIAGNOSIS — O09.512 PRIMIGRAVIDA OF ADVANCED MATERNAL AGE IN SECOND TRIMESTER: ICD-10-CM

## 2022-12-20 DIAGNOSIS — O09.92 SUPERVISION OF HIGH RISK PREGNANCY IN SECOND TRIMESTER: ICD-10-CM

## 2022-12-20 DIAGNOSIS — O09.891 MEDICATION EXPOSURE DURING FIRST TRIMESTER OF PREGNANCY: ICD-10-CM

## 2022-12-20 DIAGNOSIS — O09.92 SUPERVISION OF HIGH RISK PREGNANCY IN SECOND TRIMESTER: Primary | ICD-10-CM

## 2022-12-20 DIAGNOSIS — O99.210 OBESITY IN PREGNANCY, ANTEPARTUM: ICD-10-CM

## 2022-12-20 DIAGNOSIS — Z36.2 ENCOUNTER FOR FOLLOW-UP ULTRASOUND OF FETAL ANATOMY: Primary | ICD-10-CM

## 2022-12-20 PROCEDURE — 99213 PR OFFICE/OUTPT VISIT, EST, LEVL III, 20-29 MIN: ICD-10-PCS | Mod: TH,S$PBB,, | Performed by: OBSTETRICS & GYNECOLOGY

## 2022-12-20 PROCEDURE — 76811 OB US DETAILED SNGL FETUS: CPT | Mod: PBBFAC | Performed by: OBSTETRICS & GYNECOLOGY

## 2022-12-20 PROCEDURE — 3008F PR BODY MASS INDEX (BMI) DOCUMENTED: ICD-10-PCS | Mod: CPTII,,, | Performed by: OBSTETRICS & GYNECOLOGY

## 2022-12-20 PROCEDURE — 76811 OB US DETAILED SNGL FETUS: CPT | Mod: 26,S$PBB,, | Performed by: OBSTETRICS & GYNECOLOGY

## 2022-12-20 PROCEDURE — 99205 PR OFFICE/OUTPT VISIT, NEW, LEVL V, 60-74 MIN: ICD-10-PCS | Mod: S$PBB,TH,25, | Performed by: OBSTETRICS & GYNECOLOGY

## 2022-12-20 PROCEDURE — 99999 PR PBB SHADOW E&M-EST. PATIENT-LVL III: CPT | Mod: PBBFAC,,, | Performed by: OBSTETRICS & GYNECOLOGY

## 2022-12-20 PROCEDURE — 3074F PR MOST RECENT SYSTOLIC BLOOD PRESSURE < 130 MM HG: ICD-10-PCS | Mod: CPTII,,, | Performed by: OBSTETRICS & GYNECOLOGY

## 2022-12-20 PROCEDURE — 1159F PR MEDICATION LIST DOCUMENTED IN MEDICAL RECORD: ICD-10-PCS | Mod: CPTII,,, | Performed by: OBSTETRICS & GYNECOLOGY

## 2022-12-20 PROCEDURE — 1159F MED LIST DOCD IN RCRD: CPT | Mod: CPTII,,, | Performed by: OBSTETRICS & GYNECOLOGY

## 2022-12-20 PROCEDURE — 3078F PR MOST RECENT DIASTOLIC BLOOD PRESSURE < 80 MM HG: ICD-10-PCS | Mod: CPTII,,, | Performed by: OBSTETRICS & GYNECOLOGY

## 2022-12-20 PROCEDURE — 99999 PR PBB SHADOW E&M-EST. PATIENT-LVL III: ICD-10-PCS | Mod: PBBFAC,,, | Performed by: OBSTETRICS & GYNECOLOGY

## 2022-12-20 PROCEDURE — 36415 COLL VENOUS BLD VENIPUNCTURE: CPT | Performed by: OBSTETRICS & GYNECOLOGY

## 2022-12-20 PROCEDURE — 82105 ALPHA-FETOPROTEIN SERUM: CPT | Performed by: OBSTETRICS & GYNECOLOGY

## 2022-12-20 PROCEDURE — 99213 OFFICE O/P EST LOW 20 MIN: CPT | Mod: PBBFAC,TH | Performed by: OBSTETRICS & GYNECOLOGY

## 2022-12-20 PROCEDURE — 99213 OFFICE O/P EST LOW 20 MIN: CPT | Mod: TH,S$PBB,, | Performed by: OBSTETRICS & GYNECOLOGY

## 2022-12-20 PROCEDURE — 99213 OFFICE O/P EST LOW 20 MIN: CPT | Mod: PBBFAC,27,TH,25 | Performed by: OBSTETRICS & GYNECOLOGY

## 2022-12-20 PROCEDURE — 3078F DIAST BP <80 MM HG: CPT | Mod: CPTII,,, | Performed by: OBSTETRICS & GYNECOLOGY

## 2022-12-20 PROCEDURE — 99205 OFFICE O/P NEW HI 60 MIN: CPT | Mod: S$PBB,TH,25, | Performed by: OBSTETRICS & GYNECOLOGY

## 2022-12-20 PROCEDURE — 76811 PR US, OB FETAL EVAL & EXAM, TRANSABDOM,FIRST GESTATION: ICD-10-PCS | Mod: 26,S$PBB,, | Performed by: OBSTETRICS & GYNECOLOGY

## 2022-12-20 PROCEDURE — 3008F BODY MASS INDEX DOCD: CPT | Mod: CPTII,,, | Performed by: OBSTETRICS & GYNECOLOGY

## 2022-12-20 PROCEDURE — 3074F SYST BP LT 130 MM HG: CPT | Mod: CPTII,,, | Performed by: OBSTETRICS & GYNECOLOGY

## 2022-12-20 NOTE — PROGRESS NOTES
MATERNAL-FETAL MEDICINE   CONSULT NOTE    Provider requesting consultation: Dr. Jeansonne    SUBJECTIVE:     Ms. Can Wright is a 37 y.o.  female with IUP at 20w1d who is seen in consultation by MFM for evaluation and management of:  Problem   [place] (Advanced Maternal Age) Multigravida 35+, Second Trimester   Medication Exposure During First Trimester of Pregnancy   Chiari Malformation Type I     Patient presents for initial consultation today. Chiari malformation diagnosed in  after presenting with symptoms of upper extremity and back pain. No surgical intervention was required, and patient is currently asymptomatic. Does not follow with Neurosurgery.     No additional complaints today. No contractions, LOF, VB.        Medication List with Changes/Refills   Current Medications    EPINEPHRINE (EPIPEN) 0.3 MG/0.3 ML ATIN    Inject 0.3 mLs (0.3 mg total) into the muscle once. for 1 dose    ESCITALOPRAM OXALATE (LEXAPRO) 20 MG TABLET    TAKE 1 TABLET BY MOUTH EVERY DAY    FLUTICASONE PROPIONATE (FLONASE) 50 MCG/ACTUATION NASAL SPRAY    INSTILL ONE SPRAY INTO EACH NOSTRIL ONCE DAILY    FOLIC ACID (FOLVITE) 1 MG TABLET    Take 4 tablets (4 mg total) by mouth once daily.    GABAPENTIN (NEURONTIN) 400 MG CAPSULE    Take 1 capsule (400 mg total) by mouth 3 (three) times daily.       Review of patient's allergies indicates:   Allergen Reactions    Wellbutrin [bupropion hcl] Anxiety     Worsened anxiety    Amoxicillin trihydrate      Other reaction(s): severe stomach pain    Aspirin      Other reaction(s): gli upset    Augmentin [amoxicillin-pot clavulanate] Nausea Only    Banana extract      Other reaction(s): swelling, itchy lips    Cat hair standardized allergenic extract      Other reaction(s): itchy eyes    Corn syrup      Other reaction(s): Unknown    Pollen extracts      Other reaction(s): sneezing, stuffy nose    Potassium clavulanate      Other reaction(s): severe stomach pain    [place]      Other  reaction(s): Unknown    Shellfish derived      Other reaction(s): Anaphylaxis    [place]      Other reaction(s): itchy throat       PMH:  Past Medical History:   Diagnosis Date    ADD (attention deficit disorder)     Allergy     Anxiety     , nortriptyline 50 mg nightly, Lexapro 20 q.d., propranolol 10 mg prn    Attention deficit hyperactivity disorder (ADHD), combined type 2014    Carpal tunnel syndrome on both sides     Neurontin 400 mg 3 times a day for carpal tunnel & arthritis. Bilateral injections Dr Talbert, Celebrex expensive    Chiari I malformation     Dysthymic disorder     lexapro    Episodic paroxysmal hemicrania, not intractable 2022    Hyperlipidemia     Shellfish allergy     epi pen    Syringomyelia     asymptomatic, released from Dr Lyon, steroids worked initially, tx Celebrex, Neurontin, Tyl #3 (helps prn), Dr Samia braswell recommended surgery       PObHx:  OB History    Para Term  AB Living   2       1     SAB IAB Ectopic Multiple Live Births   1              # Outcome Date GA Lbr [place]/2nd Weight Sex Delivery Anes PTL Lv   2 Current            1 SAB               Obstetric Comments   2020       PSH:  Past Surgical History:   Procedure Laterality Date    WISDOM TOOTH EXTRACTION         Family history:family history includes Arthritis in her maternal grandmother and mother; Cancer in her maternal uncle; Depression in her father; Fibromyalgia in her mother; Heart disease in her maternal grandfather; Hyperlipidemia in her mother.    Social history: reports that she has quit smoking. Her smoking use included cigarettes. She started smoking about 6 years ago. She has a 3.50 pack-year smoking history. She has never used smokeless tobacco. She reports current alcohol use of about 1.0 standard drink per week. She reports that she does not use drugs.    Genetic history:  The patient denies any inherited genetic diseases or birth defects in herself or her partner's  "personal history or family.    Objective:   /76 (BP Location: Left arm, Patient Position: Sitting, BP Method: X-Large (Automatic))   Ht 5' 1" (1.549 m)   Wt 88.4 kg (194 lb 14.2 oz)   LMP 2022   BMI 36.82 kg/m²     Ultrasound performed. See viewpoint for full ultrasound report.  A detailed fetal anatomic ultrasound examination was performed for the following high risk indication: Maternal Chiari 1 malformation.   No fetal structural malformations are identified; however, fetal imaging is incomplete today.   Fetal size today is consistent with established gestational age ( g).   Transabdominal cervical length is normal.   Placental location is posterior, low lying, 9mm placenta to cx.  Small simple cyst is seen in the left adnexa.    Significant labs/imaging:  Low risk [id number]    ASSESSMENT/PLAN:     37 y.o.  female with IUP at 20w1d     Chiari malformation type I  Patient was counseled regarding Chiari malformation and how it pertains to pregnancy.     Despite concern for increased maternal complications in this patient population, there are very few reports of serious complications or poor outcomes.  In asymptomatic patients, it is generally felt safe to proceed with vaginal delivery. Decompression prior to delivery is typically only considered for patients with significant symptoms. Ms. Wright denies a history of decompression surgery.     Patient currently asymptomatic. She states that after continued evaluation, providers felt her initial symptoms were due to stress and anxiety. She did not need any surgical or medical intervention and has not followed up with Neurosurgery.     There is a concern with increasing intracranial pressure throughout pregnancy, especially during labor and pushing/valsalva.  Given the malformation, there is also special consideration when it comes to anesthesia during labor and delivery.    Recommend updated imaging and Neurosurgery consultation in " pregnancy to further guide delivery recommendations.     Recommendations:  Referral to Neurosurgery to establish care and for further recommendations moving forward during pregnancy.  Would recommend patient have an anesthesia consultation in early 3rd trimester for labor and delivery planning.  Neuroimaging per Neurosurgery.    Medication exposure during first trimester of pregnancy  Patient taking Gabapentin for carpal tunnel pain. Discussed risks, benefits, alternatives. Patient will continue, but attempt to use as little as possible. Patient on nortriptyline and Lexapro for anxiety. Discussed r/b/a, patient working to discontinue nortriptyline. Will continue Lexapro.     SSRIs. There is an extensive literature regarding the risks of medications, particularly SSRIs, in pregnancy.  The literature is quite heterogenous, with conservative estimates demonstrating a 1.5-2.0-fold overall increased risk of fetal malformations. 4 more recent meta-analyses examining the risk of major malformations with first-trimester SSRI exposure found no statistically significant increased risk. A possible association between in utero SSRI antidepressant exposure and persistent pulmonary hypertension in the  (PPHN) has been suggested, however most recent study analysed 3.8 million pregnancies and found no increased risk for PPHN. Up to 30 % of infants exposed to SSRI/serotonin noradrenaline reuptake inhibitors (SNRIs) late in pregnancy develop a spectrum of symptoms, including irritability, respiratory distress, jitteriness, tachypnea, hypoglycemia, temperature instability, weak cry, poor tone, decreased feeding, and lethargy. Symptoms are transient and resolve with supportive care in days to weeks.    Gabapentin is considered a safe alternative for the management of anxiety symptoms during pregnancy. Several studies have indicated that there is no increased risk of major congenital malformations with gabapentin, although a  recent study found a higher rate of  birth, low birth weight, and need for  intensive care admission. Additionally, animal experiments did show association with fetal growth impairment and developmental delay.      AMA (advanced maternal age) multigravida 35+, second trimester  Advanced Maternal Age     Today I counseled the patient on the relationship between maternal age and fetal aneuploidy.  We discussed the risks and benefits of screening tests versus definitive genetic testing (amniocentesis). She was counseled about her specific age-related risk of fetal aneuploidy. We discussed the limitations of ultrasound in the definitive diagnosis of fetal aneuploidy.  I quoted the patient a 1 in 900 procedure related risk of fetal loss with genetic amniocentesis.  We also discussed cell free fetal DNA screening including the sensitivity and specificity of the test.      Patient has completed [id number], low risk. AFP pending. Patient is satisfied with this testing and declines amniocentesis at this time.     Additionally, we discussed the association between advanced maternal age (AMA) and preeclampsia, gestational diabetes, and fetal growth restriction.    Recommendations:  Detailed anatomic survey at 19-20 weeks, suboptimal.     Follow-up fetal ultrasound at 32-34 weeks for interval fetal growth  Consider low dose aspirin at 12-16 weeks for preeclampsia risk reduction      FOLLOW UP:   Follow up in 4 weeks for completion of anatomy    60 minutes of total time spent on the encounter, which includes face to face time and non-face to face time preparing to see the patient (eg, review of tests), obtaining and/or reviewing separately obtained history, documenting clinical information in the electronic or other health record, independently interpreting results (not separately reported) and communicating results to the patient/family/caregiver, or care coordination (not separately reported).      Lola  Maggie  Maternal-Fetal Medicine    Electronically Signed by Lola Serrano December 20, 2022

## 2022-12-20 NOTE — ASSESSMENT & PLAN NOTE
Patient taking Gabapentin for carpal tunnel pain. Discussed risks, benefits, alternatives. Patient will continue, but attempt to use as little as possible. Patient on nortriptyline and Lexapro for anxiety. Discussed r/b/a, patient working to discontinue nortriptyline. Will continue Lexapro.     SSRIs. There is an extensive literature regarding the risks of medications, particularly SSRIs, in pregnancy.  The literature is quite heterogenous, with conservative estimates demonstrating a 1.5-2.0-fold overall increased risk of fetal malformations. 4 more recent meta-analyses examining the risk of major malformations with first-trimester SSRI exposure found no statistically significant increased risk. A possible association between in utero SSRI antidepressant exposure and persistent pulmonary hypertension in the  (PPHN) has been suggested, however most recent study analysed 3.8 million pregnancies and found no increased risk for PPHN. Up to 30 % of infants exposed to SSRI/serotonin noradrenaline reuptake inhibitors (SNRIs) late in pregnancy develop a spectrum of symptoms, including irritability, respiratory distress, jitteriness, tachypnea, hypoglycemia, temperature instability, weak cry, poor tone, decreased feeding, and lethargy. Symptoms are transient and resolve with supportive care in days to weeks.    Bupropion (Wellbutrin) is an antidepressant that functions as a dopamine reuptake inhibitor.  Bupropion has been shown to cross the placenta in in vitro studies.  Adverse events have been reported in some animal reproductive studies. However, an increased risk of congenital malformations has not been consistently observed in studies of maternal use of bupropion in first trimester.  The long-term effects on children exposed to bupropion in utero have not been studied. Use of wellbutrin in pregnancy may be acceptable when considering the risks of untreated psychiatric disorders against the benefits in its use.

## 2022-12-20 NOTE — RESEARCH
"  Screening Visit  Sponsor: [employer] "Miracle of Life Study"  Study: Observational Study of Pregnant Women to Validate Biomarkers of Pregnancy Complication Risk  IRB/Protocol #: 2021.103/ [id number]  Principle Investigator/ Sub-Investigator: Jensen Gomez MD  Site: [facility]  Location: [facility] [place]  Subject Number: [id number]     Subject presented for enrollment in [employer] Study.  Subject is  20 weeks 1 days pregnant.  Subject meets all eligibility criteria for enrollment in study.     Prior to the Informed Consent (IC) being signed, or any study protocol required data collection, testing, procedure, or intervention being performed, the following was done and/or discussed:?   Patient was given a copy of the IC for review??   Purpose of the study and qualifications to participate??   Study design, follow up schedule  Confidentiality and HIPAA Authorization for Release of Medical Records for the research trial/ subject's rights/research related injury?   Risk, Benefits, Compensation and Costs?   Participation in the research trial is voluntary and patient may withdraw at anytime?   Contact information for study related questions?      Patient verbalizes understanding of the above: Yes?   Contact information for CRC and PI given to patient: Yes?   Patient able to adequately summarize: the purpose of the study, the risks associated with the study, and all procedures, and follow-ups associated with the study: Yes?      Informed Consent:   Consenting was completed in private area using the most current IRB approved version of the ICF (Revised 10 Sep 2021) and patient was given ample time to review consent prior to execution of ICF.  Before signing consent, patient was asked if she had any questions and she stated "no".   Can Kyle signed the IRB approved version of informed consent form for the [employer]/ Miracle of Life research study.? Each page of the consent was reviewed with the patient and all " questions answered satisfactorily. The patient signed the paper consent form and received a copy of same (copy of informed consent made and provided to patient). The original consent was scanned into electronic medical records (EPIC).    Time of Consent Execution: 3:43pm      Blood Sample Collection:      Was the sample collected?: Yes  Sample collected by: Bhavani June  Date of collection: 12/20/2022  Time of collection: 3:50pm  Specimen Type: whole blood  Specimen shipped Fed EX Ref # [id number]  Shipped on 12/21/2022     Sample ID #: [id number]     Patient advised we would continue to follow her throughout her pregnancy and would follow up with her after her delivery, Patient verbalized understanding.     Patient received [employer] Card Token # [id number].  Research participant received $25 stipend.

## 2022-12-20 NOTE — PROGRESS NOTES
Patient with no complaints. Denies vaginal bleeding or cramping.  Good FM. Discussed with patient having glucose testing for gestational diabetes preformed between 24-28 weeks. Seeing MFM today for h/o chiari malformation. Also doing anatomy today. Mat 21 was neg. Girl. AFP today. Advised to start ASA. Taking 4mg folic acid and gabapentin PRN. Weaning off nortriptyline. RTC in 4 weeks.     Vitals signs, FHTs, urine dip, and PE findings documented, reviewed and available in OB flow chart.       I spent a total of 20 minutes on the day of the visit.This includes face to face time and non-face to face time preparing to see the patient (eg, review of tests), Obtaining and/or reviewing separately obtained history, Documenting clinical information in the electronic or other health record, Independently interpreting resultsand communicating results to the patient/family/caregiver, or Care coordination.     Coffective counseling sheet Learn Your Baby and Protect Breastfeeding discussed with mother. Instructed regarding feeding cues and methods to calm baby. Encouraged mother to download Coffective mobile [place] if she has not already done so.  Mother verbalized understanding.

## 2022-12-20 NOTE — ASSESSMENT & PLAN NOTE
Advanced Maternal Age     Today I counseled the patient on the relationship between maternal age and fetal aneuploidy.  We discussed the risks and benefits of screening tests versus definitive genetic testing (amniocentesis). She was counseled about her specific age-related risk of fetal aneuploidy. We discussed the limitations of ultrasound in the definitive diagnosis of fetal aneuploidy.  I quoted the patient a 1 in 900 procedure related risk of fetal loss with genetic amniocentesis.  We also discussed cell free fetal DNA screening including the sensitivity and specificity of the test.      Patient has completed [id number], low risk. AFP pending. Patient is satisfied with this testing and declines amniocentesis at this time.     Additionally, we discussed the association between advanced maternal age (AMA) and preeclampsia, gestational diabetes, and fetal growth restriction.    Recommendations:   Detailed anatomic survey at 19-20 weeks, suboptimal.      Follow-up fetal ultrasound at 32-34 weeks for interval fetal growth   Consider low dose aspirin at 12-16 weeks for preeclampsia risk reduction

## 2022-12-21 ENCOUNTER — PATIENT MESSAGE (OUTPATIENT)
Dept: NEUROSURGERY | Facility: CLINIC | Age: 37
End: 2022-12-21
Payer: MEDICAID

## 2022-12-22 LAB
# FETUSES US: NORMAL
AFP INTERPRETATION: NORMAL
AFP MOM SERPL: 1.44
AFP SERPL-MCNC: 73 NG/ML
AFP SERPL-MCNC: NEGATIVE NG/ML
AGE AT DELIVERY: 38
GA (DAYS): 1 D
GA (WEEKS): 20 WK
GESTATIONAL AGE METHOD: NORMAL
IDDM PATIENT QL: NORMAL
SMOKING STATUS FTND: NO

## 2023-01-14 ENCOUNTER — PATIENT MESSAGE (OUTPATIENT)
Dept: OBSTETRICS AND GYNECOLOGY | Facility: CLINIC | Age: 38
End: 2023-01-14
Payer: MEDICAID

## 2023-01-16 ENCOUNTER — PATIENT MESSAGE (OUTPATIENT)
Dept: OTHER | Facility: OTHER | Age: 38
End: 2023-01-16
Payer: MEDICAID

## 2023-01-24 ENCOUNTER — ROUTINE PRENATAL (OUTPATIENT)
Dept: OBSTETRICS AND GYNECOLOGY | Facility: CLINIC | Age: 38
End: 2023-01-24
Payer: MEDICAID

## 2023-01-24 ENCOUNTER — LAB VISIT (OUTPATIENT)
Dept: LAB | Facility: HOSPITAL | Age: 38
End: 2023-01-24
Attending: OBSTETRICS & GYNECOLOGY
Payer: MEDICAID

## 2023-01-24 VITALS
SYSTOLIC BLOOD PRESSURE: 132 MMHG | DIASTOLIC BLOOD PRESSURE: 86 MMHG | BODY MASS INDEX: 37.07 KG/M2 | WEIGHT: 196.19 LBS

## 2023-01-24 DIAGNOSIS — O09.92 SUPERVISION OF HIGH RISK PREGNANCY IN SECOND TRIMESTER: ICD-10-CM

## 2023-01-24 DIAGNOSIS — G93.5 CHIARI MALFORMATION TYPE I: ICD-10-CM

## 2023-01-24 DIAGNOSIS — O09.512 PRIMIGRAVIDA OF ADVANCED MATERNAL AGE IN SECOND TRIMESTER: ICD-10-CM

## 2023-01-24 DIAGNOSIS — O09.92 SUPERVISION OF HIGH RISK PREGNANCY IN SECOND TRIMESTER: Primary | ICD-10-CM

## 2023-01-24 DIAGNOSIS — O99.810 GLUCOSE INTOLERANCE OF PREGNANCY: Primary | ICD-10-CM

## 2023-01-24 LAB
BASOPHILS # BLD AUTO: 0.03 K/UL (ref 0–0.2)
BASOPHILS NFR BLD: 0.2 % (ref 0–1.9)
DIFFERENTIAL METHOD: ABNORMAL
EOSINOPHIL # BLD AUTO: 0.1 K/UL (ref 0–0.5)
EOSINOPHIL NFR BLD: 0.6 % (ref 0–8)
ERYTHROCYTE [DISTWIDTH] IN BLOOD BY AUTOMATED COUNT: 14.2 % (ref 11.5–14.5)
GLUCOSE SERPL-MCNC: 178 MG/DL (ref 70–140)
HCT VFR BLD AUTO: 33.5 % (ref 37–48.5)
HGB BLD-MCNC: 10.5 G/DL (ref 12–16)
IMM GRANULOCYTES # BLD AUTO: 0.11 K/UL (ref 0–0.04)
IMM GRANULOCYTES NFR BLD AUTO: 0.8 % (ref 0–0.5)
LYMPHOCYTES # BLD AUTO: 2.6 K/UL (ref 1–4.8)
LYMPHOCYTES NFR BLD: 19.6 % (ref 18–48)
MCH RBC QN AUTO: 26.9 PG (ref 27–31)
MCHC RBC AUTO-ENTMCNC: 31.3 G/DL (ref 32–36)
MCV RBC AUTO: 86 FL (ref 82–98)
MONOCYTES # BLD AUTO: 0.5 K/UL (ref 0.3–1)
MONOCYTES NFR BLD: 3.4 % (ref 4–15)
NEUTROPHILS # BLD AUTO: 9.9 K/UL (ref 1.8–7.7)
NEUTROPHILS NFR BLD: 75.4 % (ref 38–73)
NRBC BLD-RTO: 0 /100 WBC
PLATELET # BLD AUTO: 417 K/UL (ref 150–450)
PMV BLD AUTO: 9.6 FL (ref 9.2–12.9)
RBC # BLD AUTO: 3.91 M/UL (ref 4–5.4)
WBC # BLD AUTO: 13.13 K/UL (ref 3.9–12.7)

## 2023-01-24 PROCEDURE — 99213 OFFICE O/P EST LOW 20 MIN: CPT | Mod: PBBFAC,TH | Performed by: OBSTETRICS & GYNECOLOGY

## 2023-01-24 PROCEDURE — 99999 PR PBB SHADOW E&M-EST. PATIENT-LVL III: ICD-10-PCS | Mod: PBBFAC,,, | Performed by: OBSTETRICS & GYNECOLOGY

## 2023-01-24 PROCEDURE — 99999 PR PBB SHADOW E&M-EST. PATIENT-LVL III: CPT | Mod: PBBFAC,,, | Performed by: OBSTETRICS & GYNECOLOGY

## 2023-01-24 PROCEDURE — 99213 PR OFFICE/OUTPT VISIT, EST, LEVL III, 20-29 MIN: ICD-10-PCS | Mod: TH,S$PBB,, | Performed by: OBSTETRICS & GYNECOLOGY

## 2023-01-24 PROCEDURE — 36415 COLL VENOUS BLD VENIPUNCTURE: CPT | Performed by: OBSTETRICS & GYNECOLOGY

## 2023-01-24 PROCEDURE — 99213 OFFICE O/P EST LOW 20 MIN: CPT | Mod: TH,S$PBB,, | Performed by: OBSTETRICS & GYNECOLOGY

## 2023-01-24 PROCEDURE — 82950 GLUCOSE TEST: CPT | Performed by: OBSTETRICS & GYNECOLOGY

## 2023-01-24 PROCEDURE — 85025 COMPLETE CBC W/AUTO DIFF WBC: CPT | Performed by: OBSTETRICS & GYNECOLOGY

## 2023-01-24 RX ORDER — FERROUS SULFATE 325(65) MG
325 TABLET, DELAYED RELEASE (ENTERIC COATED) ORAL DAILY
Qty: 90 TABLET | Refills: 1 | Status: SHIPPED | OUTPATIENT
Start: 2023-01-24 | End: 2023-05-12

## 2023-01-24 NOTE — PROGRESS NOTES
Patient with no complaints. Denies vaginal bleeding or contractions. Good FM. GTT/CBC today.   labor precautions discussed with patient. RTC in 4 weeks. Saw MFM, has f/u with them, Seeing neurosurg next month. Weaning off nortriptyline still. Taking gabapentin sparingly. Anesthesia consult placed.     Vitals signs, FHTs, urine dip, and PE findings documented, reviewed and available in OB flow chart.       I spent a total of 20 minutes on the day of the visit.This includes face to face time and non-face to face time preparing to see the patient (eg, review of tests), Obtaining and/or reviewing separately obtained history, Documenting clinical information in the electronic or other health record, Independently interpreting resultsand communicating results to the patient/family/caregiver, or Care coordination.     Coffective counseling sheet Nourish discussed with mother. Reinforced basic breastfeeding position and latch as well as proper hand expression technique and avoidance of artificial nipples and formula unless medically indicated. Encouraged mother to download Coffective mobile [place] if she has not already done so.  Mother verbalizes understanding.

## 2023-01-30 ENCOUNTER — PATIENT MESSAGE (OUTPATIENT)
Dept: OTHER | Facility: OTHER | Age: 38
End: 2023-01-30
Payer: MEDICAID

## 2023-01-30 ENCOUNTER — PATIENT MESSAGE (OUTPATIENT)
Dept: MATERNAL FETAL MEDICINE | Facility: CLINIC | Age: 38
End: 2023-01-30
Payer: MEDICAID

## 2023-01-31 ENCOUNTER — LAB VISIT (OUTPATIENT)
Dept: LAB | Facility: OTHER | Age: 38
End: 2023-01-31
Attending: OBSTETRICS & GYNECOLOGY
Payer: MEDICAID

## 2023-01-31 ENCOUNTER — PROCEDURE VISIT (OUTPATIENT)
Dept: MATERNAL FETAL MEDICINE | Facility: CLINIC | Age: 38
End: 2023-01-31
Payer: MEDICAID

## 2023-01-31 DIAGNOSIS — O99.810 GLUCOSE INTOLERANCE OF PREGNANCY: ICD-10-CM

## 2023-01-31 DIAGNOSIS — Z36.89 ENCOUNTER FOR ULTRASOUND TO ASSESS FETAL GROWTH: Primary | ICD-10-CM

## 2023-01-31 DIAGNOSIS — O24.419 GESTATIONAL DIABETES MELLITUS (GDM) IN SECOND TRIMESTER, GESTATIONAL DIABETES METHOD OF CONTROL UNSPECIFIED: Primary | ICD-10-CM

## 2023-01-31 DIAGNOSIS — Z36.2 ENCOUNTER FOR FOLLOW-UP ULTRASOUND OF FETAL ANATOMY: ICD-10-CM

## 2023-01-31 DIAGNOSIS — G93.5 CHIARI MALFORMATION TYPE I: ICD-10-CM

## 2023-01-31 LAB
GLUCOSE SERPL-MCNC: 135 MG/DL
GLUCOSE SERPL-MCNC: 223 MG/DL
GLUCOSE SERPL-MCNC: 224 MG/DL
GLUCOSE SERPL-MCNC: 80 MG/DL (ref 70–110)

## 2023-01-31 PROCEDURE — 82951 GLUCOSE TOLERANCE TEST (GTT): CPT | Performed by: OBSTETRICS & GYNECOLOGY

## 2023-01-31 PROCEDURE — 82952 GTT-ADDED SAMPLES: CPT | Performed by: OBSTETRICS & GYNECOLOGY

## 2023-01-31 PROCEDURE — 36415 COLL VENOUS BLD VENIPUNCTURE: CPT | Performed by: OBSTETRICS & GYNECOLOGY

## 2023-01-31 PROCEDURE — 76816 US MFM PROCEDURE (VIEWPOINT): ICD-10-PCS | Mod: 26,S$PBB,, | Performed by: OBSTETRICS & GYNECOLOGY

## 2023-01-31 PROCEDURE — 76816 OB US FOLLOW-UP PER FETUS: CPT | Mod: PBBFAC | Performed by: OBSTETRICS & GYNECOLOGY

## 2023-01-31 RX ORDER — INSULIN PUMP SYRINGE, 3 ML
EACH MISCELLANEOUS
Qty: 1 EACH | Refills: 0 | Status: SHIPPED | OUTPATIENT
Start: 2023-01-31 | End: 2023-02-02

## 2023-01-31 RX ORDER — LANCETS
1 EACH MISCELLANEOUS 4 TIMES DAILY
Qty: 200 EACH | Refills: 2 | Status: SHIPPED | OUTPATIENT
Start: 2023-01-31 | End: 2023-04-14

## 2023-02-02 ENCOUNTER — PATIENT MESSAGE (OUTPATIENT)
Dept: NEUROSURGERY | Facility: CLINIC | Age: 38
End: 2023-02-02
Payer: MEDICAID

## 2023-02-02 DIAGNOSIS — O24.419 GESTATIONAL DIABETES MELLITUS (GDM) IN SECOND TRIMESTER, GESTATIONAL DIABETES METHOD OF CONTROL UNSPECIFIED: Primary | ICD-10-CM

## 2023-02-02 RX ORDER — DEXTROSE 4 G
TABLET,CHEWABLE ORAL
Qty: 1 EACH | Refills: 0 | Status: ON HOLD | OUTPATIENT
Start: 2023-02-02 | End: 2023-05-01 | Stop reason: HOSPADM

## 2023-02-08 ENCOUNTER — TELEPHONE (OUTPATIENT)
Dept: OBSTETRICS AND GYNECOLOGY | Facility: CLINIC | Age: 38
End: 2023-02-08
Payer: MEDICAID

## 2023-02-08 ENCOUNTER — CLINICAL SUPPORT (OUTPATIENT)
Dept: DIABETES | Facility: CLINIC | Age: 38
End: 2023-02-08
Payer: MEDICAID

## 2023-02-08 DIAGNOSIS — O24.419 GESTATIONAL DIABETES MELLITUS (GDM) IN SECOND TRIMESTER, GESTATIONAL DIABETES METHOD OF CONTROL UNSPECIFIED: ICD-10-CM

## 2023-02-08 PROCEDURE — 99999 PR PBB SHADOW E&M-EST. PATIENT-LVL I: CPT | Mod: PBBFAC,,, | Performed by: DIETITIAN, REGISTERED

## 2023-02-08 PROCEDURE — G0108 DIAB MANAGE TRN  PER INDIV: HCPCS | Mod: PBBFAC,PO | Performed by: DIETITIAN, REGISTERED

## 2023-02-08 PROCEDURE — 99999 PR PBB SHADOW E&M-EST. PATIENT-LVL I: ICD-10-PCS | Mod: PBBFAC,,, | Performed by: DIETITIAN, REGISTERED

## 2023-02-08 PROCEDURE — 99211 OFF/OP EST MAY X REQ PHY/QHP: CPT | Mod: PBBFAC,PO | Performed by: DIETITIAN, REGISTERED

## 2023-02-08 NOTE — PROGRESS NOTES
Diabetes Care Specialist Progress Note  Author: Tanya Medina RD, CDE  Date: 2/8/2023    Program Intake  Reason for Diabetes Program Visit:: Initial Diabetes Assessment  Current diabetes risk level:: low  In the last 12 months, have you:: none  Permission to speak with others about care:: yes (Mom - present at visit)    No results found for: LABA1C, HGBA1C    Clinical    Problem Review  Reviewed Problem List with Patient: yes  Active comorbidities affecting diabetes self-care.: no  Reviewed health maintenance: yes    Clinical Assessment  Have you ever experienced hypoglycemia (low blood sugar)?: no  Have you ever experienced hyperglycemia (high blood sugar)?: no    Labs  Do you have regular lab work to monitor your medications?: Yes    Nutritional Status  Diet: Regular    Additional Social History    Support  Does anyone support you with your diabetes care?: yes  Who supports you?: family member, self  Who takes you to your medical appointments?: self  Does the current support meet the patient's needs?: Yes  Is Support an area impacting ability to self-manage diabetes?: No    Access to Mass Media & Technology  Does the patient have access to any of the following devices or technologies?: Smart phone  Media or technology needs impacting ability to self-manage diabetes?: No    Cognitive/Behavioral Health  Alert and Oriented: Yes  Difficulty Thinking: No  Requires Prompting: No  Requires assistance for routine expression?: No  Cognitive or behavioral barriers impacting ability to self-manage diabetes?: No    Culture/Alevism  Culture or Buddhism beliefs that may impact ability to access healthcare: No    Communication  Language preference: English  Hearing Problems: No  Vision Problems: No  Communication needs impacting ability to self-manage diabetes?: No    Health Literacy  Preferred Learning Method: Face to Face  How often do you need to have someone help you read instructions, pamphlets, or written material from  your doctor or pharmacy?: Never  Health literacy needs impacting ability to self-manage diabetes?: No      Diabetes Self-Management Skills Assessment    Diabetes Disease Process/Treatment Options  Patient/caregiver able to state what happens when someone has diabetes.: no  Patient/caregiver knows what type of diabetes they have.: yes  Diabetes Type : Gestational  Patient/caregiver able to identify at least three signs and symptoms of diabetes.: no  Patient able to identify at least three risk factors for diabetes.: no  Diabetes Disease Process/Treatment Options: Skills Assessment Completed: Yes  Assessment indicates:: Instruction Needed  Area of need?: Yes    Nutrition/Healthy Eating  Method of carbohydrate measurement:: no method  Patient can identify foods that impact blood sugar.: no (see comments)  Nutrition/Healthy Eating Skills Assessment Completed:: Yes  Assessment indicates:: Instruction Needed  Area of need?: Yes    Physical Activity/Exercise  Patient's daily activity level:: sedentary  Patient formally exercises outside of work.: no  Patient can identify forms of physical activity.: yes  Stated forms of physical activity:: any movement performed by muscles that uses energy  Patient can identify reasons why exercise/physical activity is important in diabetes management.: no  Physical Activity/Exercise Skills Assessment Completed: : Yes  Assessment indicates:: Instruction Needed  Area of need?: Yes    Medications  Medication Skills Assessment Completed:: No  Deffered due to:: Other (comment) (Not on DM meds)  Area of need?: No    Home Blood Glucose Monitoring  Patient states that blood sugar is checked at home daily.: no  Reasons for not monitoring:: new diabetes diagnosis  Home Blood Glucose Monitoring Skills Assessment Completed: : Yes  Assessment indicates:: Instruction Needed  Area of need?: Yes    Acute Complications  Patient is able to identify types of acute complications: No  Acute Complications  Skills Assessment Completed: : Yes  Assessment indicates:: Instruction Needed  Area of need?: Yes    Chronic Complications  Chronic Complications Skills Assessment Completed: : No  Deferred due to:: Other (comment) (GDM)  Area of need?: No    Psychosocial/Coping  Patient can identify ways of coping with chronic disease.: yes  Patient-stated ways of coping with chronic disease:: support from loved ones  Psychosocial/Coping Skills Assessment Completed: : Yes  Assessment indicates:: Adequate understanding  Area of need?: No    Assessment Summary and Plan    Based on today's diabetes care assessment, the following areas of need were identified:      Social 2/8/2023   Support No   Access to [employer] No   Cognitive/Behavioral Health No   Culture/Religious No   Communication No   Health Literacy No              Diabetes Self-Management Skills 2/8/2023   Diabetes Disease Process/Treatment Options Yes - reviewed GDM disease process and treatment options   Nutrition/Healthy Eating Yes - reviewed CHO counting, label reading and addt'l resources to assist w/ CHO counting; plate method reviewed; alternatives to sugary beverages discussed   Physical Activity/Exercise Yes - reviewed goals and benefits   Medication No   Home Blood Glucose Monitoring Yes - reviewed SMBG schedule and BG goals; instructed patient on use of the True Metrix meter   Acute Complications Yes - reviewed potential complications of uncontrolled GDM   Chronic Complications No   Psychosocial/Coping No          Today's interventions were provided through individual discussion, instruction, and written materials were provided.      Patient verbalized understanding of instruction and written materials.  Pt was able to return back demonstration of instructions today. Patient understood key points, needs reinforcement and further instruction.     Diabetes Self-Management Care Plan:    Today's Diabetes Self-Management Care Plan was developed with Can's input.  Can has agreed to work toward the following goal(s) to improve his/her overall diabetes control.      Care Plan: Diabetes Management   Updates made since 1/9/2023 12:00 AM        Problem: Blood Glucose Self-Monitoring         Goal: Patient agrees to check and record blood sugars 4 times per day.    Start Date: 2/8/2023   Expected End Date: 4/10/2023   Priority: High   Barriers: No Barriers Identified        Task: Reviewed the importance of self-monitoring blood glucose and keeping logs. Completed 2/8/2023        Task: Instructed on how to self-monitor blood glucose using a home glucometer, how to properly dispose of used strips and lancets after use, and how to appropriately store meter and supplies. Completed 2/8/2023        Task: Provided patient with blood glucose logs, reviewed appropriate timing and frequency to SMBG, education on parameters on when to notify provider and advised patient to bring logs to all appts with PCP/Endocrinologist/Diabetes Care Specialist. Completed 2/8/2023          Follow Up Plan     Follow up in about 4 weeks (around 3/8/2023).    Today's care plan and follow up schedule was discussed with patient.  Can verbalized understanding of the care plan, goals, and agrees to follow up plan.        The patient was encouraged to communicate with his/her health care provider/physician and care team regarding his/her condition(s) and treatment.  I provided the patient with my contact information today and encouraged to contact me via phone or [facility] Patient Portal as needed.     Length of Visit   Total Time: 60 Minutes

## 2023-02-13 ENCOUNTER — PATIENT MESSAGE (OUTPATIENT)
Dept: OTHER | Facility: OTHER | Age: 38
End: 2023-02-13
Payer: MEDICAID

## 2023-02-14 ENCOUNTER — PATIENT MESSAGE (OUTPATIENT)
Dept: PRIMARY CARE CLINIC | Facility: CLINIC | Age: 38
End: 2023-02-14
Payer: MEDICAID

## 2023-02-14 ENCOUNTER — OFFICE VISIT (OUTPATIENT)
Dept: NEUROSURGERY | Facility: CLINIC | Age: 38
End: 2023-02-14
Payer: MEDICAID

## 2023-02-14 VITALS
SYSTOLIC BLOOD PRESSURE: 126 MMHG | TEMPERATURE: 99 F | DIASTOLIC BLOOD PRESSURE: 82 MMHG | HEART RATE: 110 BPM | WEIGHT: 194 LBS | BODY MASS INDEX: 38.09 KG/M2 | HEIGHT: 60 IN

## 2023-02-14 DIAGNOSIS — G95.0 SYRINGOMYELIA: Primary | ICD-10-CM

## 2023-02-14 DIAGNOSIS — G93.5 CHIARI MALFORMATION TYPE I: ICD-10-CM

## 2023-02-14 PROCEDURE — 3074F PR MOST RECENT SYSTOLIC BLOOD PRESSURE < 130 MM HG: ICD-10-PCS | Mod: CPTII,,, | Performed by: NEUROLOGICAL SURGERY

## 2023-02-14 PROCEDURE — 3008F BODY MASS INDEX DOCD: CPT | Mod: CPTII,,, | Performed by: NEUROLOGICAL SURGERY

## 2023-02-14 PROCEDURE — 3079F DIAST BP 80-89 MM HG: CPT | Mod: CPTII,,, | Performed by: NEUROLOGICAL SURGERY

## 2023-02-14 PROCEDURE — 99205 PR OFFICE/OUTPT VISIT, NEW, LEVL V, 60-74 MIN: ICD-10-PCS | Mod: S$PBB,,, | Performed by: NEUROLOGICAL SURGERY

## 2023-02-14 PROCEDURE — 1160F PR REVIEW ALL MEDS BY PRESCRIBER/CLIN PHARMACIST DOCUMENTED: ICD-10-PCS | Mod: CPTII,,, | Performed by: NEUROLOGICAL SURGERY

## 2023-02-14 PROCEDURE — 99999 PR PBB SHADOW E&M-EST. PATIENT-LVL IV: CPT | Mod: PBBFAC,,, | Performed by: NEUROLOGICAL SURGERY

## 2023-02-14 PROCEDURE — 1160F RVW MEDS BY RX/DR IN RCRD: CPT | Mod: CPTII,,, | Performed by: NEUROLOGICAL SURGERY

## 2023-02-14 PROCEDURE — 3074F SYST BP LT 130 MM HG: CPT | Mod: CPTII,,, | Performed by: NEUROLOGICAL SURGERY

## 2023-02-14 PROCEDURE — 99205 OFFICE O/P NEW HI 60 MIN: CPT | Mod: S$PBB,,, | Performed by: NEUROLOGICAL SURGERY

## 2023-02-14 PROCEDURE — 3079F PR MOST RECENT DIASTOLIC BLOOD PRESSURE 80-89 MM HG: ICD-10-PCS | Mod: CPTII,,, | Performed by: NEUROLOGICAL SURGERY

## 2023-02-14 PROCEDURE — 1159F MED LIST DOCD IN RCRD: CPT | Mod: CPTII,,, | Performed by: NEUROLOGICAL SURGERY

## 2023-02-14 PROCEDURE — 3008F PR BODY MASS INDEX (BMI) DOCUMENTED: ICD-10-PCS | Mod: CPTII,,, | Performed by: NEUROLOGICAL SURGERY

## 2023-02-14 PROCEDURE — 99999 PR PBB SHADOW E&M-EST. PATIENT-LVL IV: ICD-10-PCS | Mod: PBBFAC,,, | Performed by: NEUROLOGICAL SURGERY

## 2023-02-14 PROCEDURE — 99214 OFFICE O/P EST MOD 30 MIN: CPT | Mod: PBBFAC | Performed by: NEUROLOGICAL SURGERY

## 2023-02-14 PROCEDURE — 1159F PR MEDICATION LIST DOCUMENTED IN MEDICAL RECORD: ICD-10-PCS | Mod: CPTII,,, | Performed by: NEUROLOGICAL SURGERY

## 2023-02-14 RX ORDER — NORTRIPTYLINE HYDROCHLORIDE 50 MG/1
50 CAPSULE ORAL NIGHTLY
Qty: 90 CAPSULE | Refills: 2 | OUTPATIENT
Start: 2023-02-14

## 2023-02-14 RX ORDER — NORTRIPTYLINE HYDROCHLORIDE 50 MG/1
25 CAPSULE ORAL EVERY OTHER DAY
COMMUNITY
Start: 2023-01-18 | End: 2023-02-15 | Stop reason: SDUPTHER

## 2023-02-14 NOTE — TELEPHONE ENCOUNTER
Refill Decision Note  Mitra MAGALLANES. Inappropriate Request     Can Wright  is requesting a refill authorization.  Brief Assessment and Rationale for Refill:  Quick Discontinue     Medication Therapy Plan:  discontinued on 11/18/2022 by Opal Shea NP for the following reason: Patient no longer taking. DOSE DECREASED PT WEANING OFF.    Medication Reconciliation Completed: No   Comments:     No Care Gaps recommended.     Note composed:12:19 PM 02/14/2023

## 2023-02-14 NOTE — TELEPHONE ENCOUNTER
No new care gaps identified.  [facility] Embedded Care Gaps. Reference number: [id number]. 2/14/2023   8:47:35 AM CST

## 2023-02-14 NOTE — PROGRESS NOTES
CHIEF COMPLAINT:  Chiari    HPI:  Can Wright is a 37 y.o.  female with below PMH, who is referred for evaluation of chiari malformation.  She is 7m pregnant with first child.  Chiari was discovered years ago during workup for severe upper back pain, which she explains was due to other reasons.  Since that time she has been asymptomatic - denies any significant headaches or headaches that worsen or are triggered by valsalva type maneuvers, paresthesias/sensory changes, gait disturbances, swallowing difficulties, or b/b issues.  She has had known syrinx as well that appears stable.    Review of patient's allergies indicates:   Allergen Reactions    Wellbutrin [bupropion hcl] Anxiety     Worsened anxiety    Amoxicillin trihydrate      Other reaction(s): severe stomach pain    Augmentin [amoxicillin-pot clavulanate] Nausea Only    Banana extract      Other reaction(s): swelling, itchy lips    Cat hair standardized allergenic extract      Other reaction(s): itchy eyes    Corn syrup      Other reaction(s): Unknown    Pollen extracts      Other reaction(s): sneezing, stuffy nose    Potassium clavulanate      Other reaction(s): severe stomach pain    Rice      Other reaction(s): Unknown    Shellfish derived      Other reaction(s): Anaphylaxis    Dupont      Other reaction(s): itchy throat       Past Medical History:   Diagnosis Date    ADD (attention deficit disorder)     Allergy     Anxiety     , nortriptyline 50 mg nightly, Lexapro 20 q.d., propranolol 10 mg prn    Attention deficit hyperactivity disorder (ADHD), combined type 04/24/2014    Carpal tunnel syndrome on both sides 2012    Neurontin 400 mg 3 times a day for carpal tunnel & arthritis. Bilateral injections Dr Talbert, Celviolet expensive    Chiari I malformation     Dysthymic disorder     lexapro    Episodic paroxysmal hemicrania, not intractable 01/27/2022    Hyperlipidemia     Shellfish allergy     epi pen    Syringomyelia     asymptomatic,  released from Dr Lyon, steroids worked initially, tx Celebrex, Neurontin, Tyl #3 (helps prn), Dr Samia braswell recommended surgery     Past Surgical History:   Procedure Laterality Date    WISDOM TOOTH EXTRACTION       Family History   Problem Relation Age of Onset    Hyperlipidemia Mother     Fibromyalgia Mother     Arthritis Mother     Depression Father     Arthritis Maternal Grandmother     Heart disease Maternal Grandfather     Cancer Maternal Uncle         liver     Social History     Tobacco Use    Smoking status: Former     Packs/day: 0.50     Years: 7.00     Pack years: 3.50     Types: Cigarettes     Start date: 6/2/2016    Smokeless tobacco: Never   Substance Use Topics    Alcohol use: Yes     Alcohol/week: 1.0 standard drink     Types: 1 Cans of beer per week    Drug use: No        Review of Systems   Constitutional: Negative.    HENT:  Negative for congestion, ear discharge, ear pain, hearing loss, nosebleeds, sinus pain and tinnitus.    Eyes: Negative.    Respiratory: Negative.     Cardiovascular:  Negative for chest pain, palpitations, claudication and leg swelling.   Gastrointestinal:  Negative for abdominal pain, blood in stool, constipation, diarrhea, melena and vomiting.   Genitourinary:  Negative for flank pain, frequency and urgency.   Musculoskeletal:  Negative for falls.   Skin: Negative.    Neurological: Negative.    Endo/Heme/Allergies:  Does not bruise/bleed easily.   Psychiatric/Behavioral: Negative.       OBJECTIVE:   Vital Signs:  Temp: 99.4 °F (37.4 °C) (02/14/23 1452)  Pulse: 110 (02/14/23 1452)  BP: 126/82 (02/14/23 1452)    Physical Exam:  Constitutional: Patient sitting comfortably in chair. Appears well developed and well nourished.  Skin: Exposed areas are intact without abnormal markings, rashes or other lesions.  HEENT: Normocephalic. Normal conjunctivae.  Cardiovascular: Normal rate and regular rhythm.  Respiratory: Chest wall rises and falls symmetrically, without signs of  respiratory distress.  Abdomen: Soft and non-tender.  Extremities: Warm and without edema. Calves supple, non-tender.  Psych/Behavior: Normal affect.    Neurological:    Mental status: Alert and oriented. Conversational and appropriate.       Cranial Nerves: VFF to confrontation. PERRL. EOMI without nystagmus. Facial STLT normal and symmetric. Strong, symmetric muscles of mastication. Facial strength full and symmetric. Hearing equal bilaterally to finger rub. Palate and uvula rise and fall normally in midline. Shoulder shrug 5/5 strength. Tongue midline.     Motor:    Upper:  Deltoids Triceps Biceps WE WF     R / 5/5 5/5 5/5 5/5 5/5    L /5 5/5 5/5 5/ 5/ 5/      Lower:  HF KE KF DF PF EHL    R  5/5 5/5 5/5 5/5 5/5    L / 5/5 5/5 5/5 5/5 5/5     Sensory: Intact sensation to light touch in all extremities. Romberg negative.    Reflexes:          DTR: 2+ symmetrically throughout.     Waldron's: Negative.     Babinski's: Negative.     Clonus: Negative.    Cerebellar: Finger-to-nose and rapid alternating movements normal.     Gait stable    Diagnostic Results:  All imaging was independently reviewed by me.    MRI brain, dated 13:  1. Tonsil descent approx 4mm    ASSESSMENT/PLAN:     Problem List Items Addressed This Visit          Neuro    Chiari malformation type I    Syringomyelia - Primary       Chiari malformation with cervical syrinx. Mild, approx 4mm.  She is clinically asymptomatic.  Would normally obtain updated BMRI but given her pregnancy and fact that she is asymptomatic, I think it is reasonable to monitor clinically.  She should undergo normal vaginal delivery but OB/GYN should have low threshold for  should she become symptomatic.      - RTC as needed      The patient understands and agrees with the plan of care. All questions were answered.    Time spent on this encounter: 60 minutes. This includes face-to-face time and non-face to face time preparing to see the patient (eg,  review of tests), obtaining and/or reviewing separately obtained history, documenting clinical information in the electronic or other health record, independently interpreting results and communicating results to the patient/family/caregiver, or care coordinator.          .

## 2023-02-15 RX ORDER — NORTRIPTYLINE HYDROCHLORIDE 50 MG/1
50 CAPSULE ORAL EVERY OTHER DAY
Qty: 45 CAPSULE | Refills: 0 | Status: SHIPPED | OUTPATIENT
Start: 2023-02-15 | End: 2023-04-14

## 2023-02-15 RX ORDER — GABAPENTIN 400 MG/1
400 CAPSULE ORAL 3 TIMES DAILY
Qty: 90 CAPSULE | Refills: 3 | Status: ON HOLD | OUTPATIENT
Start: 2023-02-15 | End: 2023-05-01 | Stop reason: HOSPADM

## 2023-02-16 ENCOUNTER — OFFICE VISIT (OUTPATIENT)
Dept: PRIMARY CARE CLINIC | Facility: CLINIC | Age: 38
End: 2023-02-16
Payer: MEDICAID

## 2023-02-16 ENCOUNTER — PATIENT MESSAGE (OUTPATIENT)
Dept: PRIMARY CARE CLINIC | Facility: CLINIC | Age: 38
End: 2023-02-16

## 2023-02-16 DIAGNOSIS — O09.522 AMA (ADVANCED MATERNAL AGE) MULTIGRAVIDA 35+, SECOND TRIMESTER: Primary | ICD-10-CM

## 2023-02-16 DIAGNOSIS — F41.9 ANXIETY: ICD-10-CM

## 2023-02-16 DIAGNOSIS — G44.039 EPISODIC PAROXYSMAL HEMICRANIA, NOT INTRACTABLE: ICD-10-CM

## 2023-02-16 DIAGNOSIS — O09.891 MEDICATION EXPOSURE DURING FIRST TRIMESTER OF PREGNANCY: ICD-10-CM

## 2023-02-16 DIAGNOSIS — F34.1 DYSTHYMIC DISORDER: ICD-10-CM

## 2023-02-16 DIAGNOSIS — G93.5 CHIARI MALFORMATION TYPE I: ICD-10-CM

## 2023-02-16 DIAGNOSIS — G95.0 SYRINGOMYELIA: ICD-10-CM

## 2023-02-16 PROBLEM — Z34.92 SECOND TRIMESTER PREGNANCY: Status: ACTIVE | Noted: 2023-02-16

## 2023-02-16 PROBLEM — Z34.90 PREGNANCY: Status: RESOLVED | Noted: 2022-10-18 | Resolved: 2023-02-16

## 2023-02-16 PROCEDURE — 1159F MED LIST DOCD IN RCRD: CPT | Mod: CPTII,95,, | Performed by: FAMILY MEDICINE

## 2023-02-16 PROCEDURE — 1159F PR MEDICATION LIST DOCUMENTED IN MEDICAL RECORD: ICD-10-PCS | Mod: CPTII,95,, | Performed by: FAMILY MEDICINE

## 2023-02-16 PROCEDURE — 99213 PR OFFICE/OUTPT VISIT, EST, LEVL III, 20-29 MIN: ICD-10-PCS | Mod: 95,,, | Performed by: FAMILY MEDICINE

## 2023-02-16 PROCEDURE — 1160F PR REVIEW ALL MEDS BY PRESCRIBER/CLIN PHARMACIST DOCUMENTED: ICD-10-PCS | Mod: CPTII,95,, | Performed by: FAMILY MEDICINE

## 2023-02-16 PROCEDURE — 99213 OFFICE O/P EST LOW 20 MIN: CPT | Mod: 95,,, | Performed by: FAMILY MEDICINE

## 2023-02-16 PROCEDURE — 1160F RVW MEDS BY RX/DR IN RCRD: CPT | Mod: CPTII,95,, | Performed by: FAMILY MEDICINE

## 2023-02-16 NOTE — PROGRESS NOTES
Subjective:      Patient ID: Can Wright is a 37 y.o. female.    Chief Complaint: No chief complaint on file.    The patient location is: home  The chief complaint leading to consultation is: med refill, pregnant    Visit type: audiovisual    Face to Face time with patient: 13  15 minutes of total time spent on the encounter, which includes face to face time and non-face to face time preparing to see the patient (eg, review of tests), Obtaining and/or reviewing separately obtained history, Documenting clinical information in the electronic or other health record, Independently interpreting results (not separately reported) and communicating results to the patient/family/caregiver, or Care coordination (not separately reported).         Each patient to whom he or she provides medical services by telemedicine is:  (1) informed of the relationship between the physician and patient and the respective role of any other health care provider with respect to management of the patient; and (2) notified that he or she may decline to receive medical services by telemedicine and may withdraw from such care at any time.    Notes:     Per maternal fetal appt, 12/20/2022 w Dr Martinez, he did say wean off nortriptyline & can take gabapentin as this may be safer for your fetus.    Currently weaning down nortriptyline 50 , 1/2 tab every other day. Gabapentin 400 mg  takes about once a day - sometimes twice, did discuss benefits & risks w the OBGYN & agree to continue this med.     I do take extra Folic acid. Taking aspirin daily.     My sugar is high, so I focussing on eating less sugar. Testing 4 x a day.     Taking lexapro 20    She did meet w High risk OB who felt she was safe to try vaginal birth w bearing down. Told ok to have epidural. Be aware of headaches.    Current Outpatient Medications   Medication Instructions    blood sugar diagnostic Strp 1 each, Misc.(Non-Drug; Combo Route), 4 times daily    blood-glucose  meter Misc Use daily as instructed    EPINEPHrine (EPIPEN) 0.3 mg, Intramuscular, Once    EScitalopram oxalate (LEXAPRO) 20 MG tablet TAKE 1 TABLET BY MOUTH EVERY DAY    ferrous sulfate 325 mg, Oral, Daily    fluticasone propionate (FLONASE) 50 mcg/actuation nasal spray INSTILL ONE SPRAY INTO EACH NOSTRIL ONCE DAILY    folic acid (FOLVITE) 4 mg, Oral, Daily    gabapentin (NEURONTIN) 400 mg, Oral, 3 times daily    lancets (ONETOUCH ULTRASOFT LANCETS) Misc 1 each, Misc.(Non-Drug; Combo Route), 4 times daily    nortriptyline (PAMELOR) 50 mg, Oral, Every other day       No results found for: HGBA1C  No results found for: MICALBCREAT  Lab Results   Component Value Date    LDLCALC 121.0 12/13/2021    LDLCALC 133.4 08/14/2020    CHOL 226 (H) 12/13/2021    HDL 47 12/13/2021    TRIG 290 (H) 12/13/2021       Lab Results   Component Value Date     10/18/2022    K 3.5 10/18/2022     10/18/2022    CO2 21 (L) 10/18/2022    GLU 99 10/18/2022    BUN 8 10/18/2022    CREATININE 0.7 10/18/2022    CALCIUM 9.7 10/18/2022    PROT 7.6 10/18/2022    ALBUMIN 3.4 (L) 10/18/2022    BILITOT 0.3 10/18/2022    ALKPHOS 81 10/18/2022    AST 21 10/18/2022    ALT 21 10/18/2022    ANIONGAP 15 10/18/2022    ESTGFRAFRICA >60.0 12/13/2021    EGFRNONAA >60.0 12/13/2021    WBC 13.13 (H) 01/24/2023    HGB 10.5 (L) 01/24/2023    HGB 13.4 10/18/2022    HCT 33.5 (L) 01/24/2023    MCV 86 01/24/2023     01/24/2023    TSH 2.430 12/13/2021    HEPCAB Non-reactive 10/18/2022       No results found for: LH, FSH, TOTALTESTOST, PROGESTERONE, ESTRADIOL, [id number], GYCAZOOT47, FERRITIN, IRON, TRANSFERRIN, TIBC, FESATURATED, ZINC      Past Medical History:   Diagnosis Date    ADD (attention deficit disorder)     Allergy     Anxiety     , nortriptyline 50 mg nightly, Lexapro 20 q.d., propranolol 10 mg prn    Attention deficit hyperactivity disorder (ADHD), combined type 04/24/2014    Carpal tunnel syndrome on both sides 2012    Neurontin 400 mg 3  times a day for carpal tunnel & arthritis. Bilateral injections Dr Talbert, Celebrex expensive    Chiari I malformation     Dysthymic disorder     lexapro    Episodic paroxysmal hemicrania, not intractable 01/27/2022    Hyperlipidemia     Shellfish allergy     epi pen    Syringomyelia     asymptomatic, released from Dr Lyon, steroids worked initially, tx Celebrex, Neurontin, Tyl #3 (helps prn), pamhusseinor, Dr Gracia recommended surgery     Past Surgical History:   Procedure Laterality Date    WISDOM TOOTH EXTRACTION       Social History     Social History Narrative    Not on file     Family History   Problem Relation Age of Onset    Hyperlipidemia Mother     Fibromyalgia Mother     Arthritis Mother     Depression Father     Arthritis Maternal Grandmother     Heart disease Maternal Grandfather     Cancer Maternal Uncle         liver     There were no vitals filed for this visit.  Objective:   Physical Exam  Assessment:     1. AMA (advanced maternal age) multigravida 35+, second trimester    2. Chiari malformation type I    3. Medication exposure during first trimester of pregnancy    4. Anxiety    5. Syringomyelia    6. Dysthymic disorder    7. Episodic paroxysmal hemicrania, not intractable      Plan:        I refilled gabapentin , weaning off of  nortriptyline   Continue PNvitamin & folic acid  Follow w OB  [facility]  There are no Patient Instructions on file for this visit.

## 2023-02-23 ENCOUNTER — PATIENT MESSAGE (OUTPATIENT)
Dept: OBSTETRICS AND GYNECOLOGY | Facility: CLINIC | Age: 38
End: 2023-02-23
Payer: MEDICAID

## 2023-02-27 ENCOUNTER — PATIENT MESSAGE (OUTPATIENT)
Dept: OTHER | Facility: OTHER | Age: 38
End: 2023-02-27
Payer: MEDICAID

## 2023-03-03 ENCOUNTER — ROUTINE PRENATAL (OUTPATIENT)
Dept: OBSTETRICS AND GYNECOLOGY | Facility: CLINIC | Age: 38
End: 2023-03-03
Payer: MEDICAID

## 2023-03-03 VITALS
WEIGHT: 192.44 LBS | DIASTOLIC BLOOD PRESSURE: 82 MMHG | SYSTOLIC BLOOD PRESSURE: 126 MMHG | BODY MASS INDEX: 37.59 KG/M2

## 2023-03-03 DIAGNOSIS — G93.5 CHIARI MALFORMATION TYPE I: ICD-10-CM

## 2023-03-03 DIAGNOSIS — O09.93 SUPERVISION OF HIGH RISK PREGNANCY IN THIRD TRIMESTER: Primary | ICD-10-CM

## 2023-03-03 DIAGNOSIS — O09.513 PRIMIGRAVIDA OF ADVANCED MATERNAL AGE IN THIRD TRIMESTER: ICD-10-CM

## 2023-03-03 DIAGNOSIS — O24.419 GESTATIONAL DIABETES MELLITUS (GDM) IN THIRD TRIMESTER, GESTATIONAL DIABETES METHOD OF CONTROL UNSPECIFIED: ICD-10-CM

## 2023-03-03 DIAGNOSIS — O44.40 LOW-LYING PLACENTA: ICD-10-CM

## 2023-03-03 PROCEDURE — 99213 OFFICE O/P EST LOW 20 MIN: CPT | Mod: TH,S$PBB,, | Performed by: OBSTETRICS & GYNECOLOGY

## 2023-03-03 PROCEDURE — 99213 PR OFFICE/OUTPT VISIT, EST, LEVL III, 20-29 MIN: ICD-10-PCS | Mod: TH,S$PBB,, | Performed by: OBSTETRICS & GYNECOLOGY

## 2023-03-03 PROCEDURE — 99213 OFFICE O/P EST LOW 20 MIN: CPT | Mod: PBBFAC,TH | Performed by: OBSTETRICS & GYNECOLOGY

## 2023-03-03 PROCEDURE — 99999 PR PBB SHADOW E&M-EST. PATIENT-LVL III: ICD-10-PCS | Mod: PBBFAC,,, | Performed by: OBSTETRICS & GYNECOLOGY

## 2023-03-03 PROCEDURE — 99999 PR PBB SHADOW E&M-EST. PATIENT-LVL III: CPT | Mod: PBBFAC,,, | Performed by: OBSTETRICS & GYNECOLOGY

## 2023-03-03 NOTE — PROGRESS NOTES
Patient with no complaints. Denies vaginal bleeding or contractions. Good FM. Growth scan ordered for next visit. Glucose logs within goal on diet alone. Has repeat US scheduled. Placenta still low last time. Anesthesia consult placed, need to be scheduled. Was cleared by Neuro for . Also saw PCP recently. Needs tdap soon.     Vitals signs, FHTs, urine dip, and PE findings documented, reviewed and available in OB flow chart.       I spent a total of 20 minutes on the day of the visit.This includes face to face time and non-face to face time preparing to see the patient (eg, review of tests), Obtaining and/or reviewing separately obtained history, Documenting clinical information in the electronic or other health record, Independently interpreting resultsand communicating results to the patient/family/caregiver, or Care coordination.     [employer] Motivation Document discussed with mother. Reinforced benefits of breastfeeding, risk of formula, and cue based feedings. Encouraged mother to download [employer] mobile [place] if she has not already done so. Mother verbalizes understanding.

## 2023-03-09 ENCOUNTER — TELEPHONE (OUTPATIENT)
Dept: OBSTETRICS AND GYNECOLOGY | Facility: CLINIC | Age: 38
End: 2023-03-09
Payer: MEDICAID

## 2023-03-09 NOTE — TELEPHONE ENCOUNTER
Spoke with pt  in regards to gerry appt [t [facility]U    ----- Message from Jonathan Gray sent at 3/9/2023 11:56 AM CST -----  Regarding: RETURN CALL  Who Called: RACHNA BLACK [[id number]]        Who Left Message for Patient:be        Does the patient know what this is regarding?yes        Best Call Back Number:[phone]        Additional Information:

## 2023-03-13 ENCOUNTER — PATIENT MESSAGE (OUTPATIENT)
Dept: MATERNAL FETAL MEDICINE | Facility: CLINIC | Age: 38
End: 2023-03-13
Payer: MEDICAID

## 2023-03-13 ENCOUNTER — PATIENT MESSAGE (OUTPATIENT)
Dept: OTHER | Facility: OTHER | Age: 38
End: 2023-03-13
Payer: MEDICAID

## 2023-03-14 ENCOUNTER — PROCEDURE VISIT (OUTPATIENT)
Dept: MATERNAL FETAL MEDICINE | Facility: CLINIC | Age: 38
End: 2023-03-14
Payer: MEDICAID

## 2023-03-14 DIAGNOSIS — Z36.89 ENCOUNTER FOR ULTRASOUND TO ASSESS FETAL GROWTH: ICD-10-CM

## 2023-03-14 PROCEDURE — 76817 US MFM PROCEDURE (VIEWPOINT): ICD-10-PCS | Mod: 26,S$PBB,, | Performed by: OBSTETRICS & GYNECOLOGY

## 2023-03-14 PROCEDURE — 76816 OB US FOLLOW-UP PER FETUS: CPT | Mod: PBBFAC | Performed by: OBSTETRICS & GYNECOLOGY

## 2023-03-14 PROCEDURE — 76817 TRANSVAGINAL US OBSTETRIC: CPT | Mod: 26,S$PBB,, | Performed by: OBSTETRICS & GYNECOLOGY

## 2023-03-14 PROCEDURE — 76816 US MFM PROCEDURE (VIEWPOINT): ICD-10-PCS | Mod: 26,S$PBB,, | Performed by: OBSTETRICS & GYNECOLOGY

## 2023-03-22 ENCOUNTER — ROUTINE PRENATAL (OUTPATIENT)
Dept: OBSTETRICS AND GYNECOLOGY | Facility: CLINIC | Age: 38
End: 2023-03-22
Payer: MEDICAID

## 2023-03-22 VITALS
WEIGHT: 194.88 LBS | BODY MASS INDEX: 38.06 KG/M2 | SYSTOLIC BLOOD PRESSURE: 124 MMHG | DIASTOLIC BLOOD PRESSURE: 76 MMHG

## 2023-03-22 DIAGNOSIS — O24.419 GESTATIONAL DIABETES MELLITUS (GDM) IN THIRD TRIMESTER, GESTATIONAL DIABETES METHOD OF CONTROL UNSPECIFIED: ICD-10-CM

## 2023-03-22 DIAGNOSIS — G93.5 CHIARI MALFORMATION TYPE I: ICD-10-CM

## 2023-03-22 DIAGNOSIS — O09.93 SUPERVISION OF HIGH RISK PREGNANCY IN THIRD TRIMESTER: Primary | ICD-10-CM

## 2023-03-22 DIAGNOSIS — O09.513 PRIMIGRAVIDA OF ADVANCED MATERNAL AGE IN THIRD TRIMESTER: ICD-10-CM

## 2023-03-22 PROCEDURE — 99213 OFFICE O/P EST LOW 20 MIN: CPT | Mod: TH,S$PBB,, | Performed by: OBSTETRICS & GYNECOLOGY

## 2023-03-22 PROCEDURE — 99999 PR PBB SHADOW E&M-EST. PATIENT-LVL III: ICD-10-PCS | Mod: PBBFAC,,, | Performed by: OBSTETRICS & GYNECOLOGY

## 2023-03-22 PROCEDURE — 99999 PR PBB SHADOW E&M-EST. PATIENT-LVL III: CPT | Mod: PBBFAC,,, | Performed by: OBSTETRICS & GYNECOLOGY

## 2023-03-22 PROCEDURE — 99213 OFFICE O/P EST LOW 20 MIN: CPT | Mod: PBBFAC,TH | Performed by: OBSTETRICS & GYNECOLOGY

## 2023-03-22 PROCEDURE — 99213 PR OFFICE/OUTPT VISIT, EST, LEVL III, 20-29 MIN: ICD-10-PCS | Mod: TH,S$PBB,, | Performed by: OBSTETRICS & GYNECOLOGY

## 2023-03-22 NOTE — PROGRESS NOTES
Patient with no complaints. Denies vaginal bleeding or contractions. Us showed normal placenta location. Accuchecks overall good on diet alone. Will set up anesthesia consult today. Tdap today. Good FM. RTC in 2 weeks.     Vitals signs, FHTs, urine dip, and PE findings documented, reviewed and available in OB flow chart.       I spent a total of 20 minutes on the day of the visit.This includes face to face time and non-face to face time preparing to see the patient (eg, review of tests), Obtaining and/or reviewing separately obtained history, Documenting clinical information in the electronic or other health record, Independently interpreting resultsand communicating results to the patient/family/caregiver, or Care coordination.

## 2023-03-28 ENCOUNTER — PATIENT MESSAGE (OUTPATIENT)
Dept: ANESTHESIOLOGY | Facility: OTHER | Age: 38
End: 2023-03-28

## 2023-03-28 ENCOUNTER — OFFICE VISIT (OUTPATIENT)
Dept: ANESTHESIOLOGY | Facility: OTHER | Age: 38
End: 2023-03-28
Attending: OBSTETRICS & GYNECOLOGY
Payer: MEDICAID

## 2023-03-28 DIAGNOSIS — O09.92 SUPERVISION OF HIGH RISK PREGNANCY IN SECOND TRIMESTER: ICD-10-CM

## 2023-03-28 DIAGNOSIS — G93.5 CHIARI MALFORMATION TYPE I: ICD-10-CM

## 2023-03-28 PROCEDURE — 99202 PR OFFICE/OUTPT VISIT, NEW, LEVL II, 15-29 MIN: ICD-10-PCS | Mod: ,,, | Performed by: ANESTHESIOLOGY

## 2023-03-28 PROCEDURE — 99202 OFFICE O/P NEW SF 15 MIN: CPT | Mod: ,,, | Performed by: ANESTHESIOLOGY

## 2023-03-28 NOTE — CONSULTS
Outpatient OB Anesthesia Consult      Date and Time: 03/28/2023 3:01 PM     Request from: Dr. Julie Jeansonne      CC requesting physician or midwife: Dr. Julie Jeansonne    Reason for Consult: Anesthetic recommendations for delivery    Initial Consult?: Yes    Chief Complaint: Arnold Chiari type 1 malformation and Syringomyelia    HPI: Patient is a 37 y.o. year old woman, G 1 P 0 presenting with the chief complaint as above.     Past medical history:    Past Medical History:   Diagnosis Date    ADD (attention deficit disorder)     Allergy     Anxiety     , nortriptyline 50 mg nightly, Lexapro 20 q.d., propranolol 10 mg prn    Attention deficit hyperactivity disorder (ADHD), combined type 04/24/2014    Carpal tunnel syndrome on both sides 2012    Neurontin 400 mg 3 times a day for carpal tunnel & arthritis. Bilateral injections Dr Talbert, Celebrex expensive    Chiari I malformation     Dysthymic disorder     lexapro    Episodic paroxysmal hemicrania, not intractable 01/27/2022    Hyperlipidemia     Shellfish allergy     epi pen    Syringomyelia     asymptomatic, released from Dr Lyon, steroids worked initially, tx Celebrex, Neurontin, Tyl #3 (helps prn), pamelor, Dr Gracia recommended surgery       Past surgical history:    Past Surgical History:   Procedure Laterality Date    WISDOM TOOTH EXTRACTION         Family history:    Family History   Problem Relation Age of Onset    Hyperlipidemia Mother     Fibromyalgia Mother     Arthritis Mother     Depression Father     Arthritis Maternal Grandmother     Heart disease Maternal Grandfather     Cancer Maternal Uncle         liver       Social History:    Social History     Socioeconomic History    Marital status: Single   Tobacco Use    Smoking status: Former     Packs/day: 0.50     Years: 7.00     Pack years: 3.50     Types: Cigarettes     Start date: 6/2/2016    Smokeless tobacco: Never   Substance and Sexual Activity    Alcohol use: Yes     Alcohol/week: 1.0  standard drink     Types: 1 Cans of beer per week    Drug use: No    Sexual activity: Yes     Birth control/protection: None       Medication:    Current Outpatient Medications on File Prior to Visit   Medication Sig Dispense Refill    blood sugar diagnostic Strp 1 each by Misc.(Non-Drug; Combo Route) route 4 (four) times daily. 200 each 1    blood-glucose meter Misc Use daily as instructed 1 each 0    EPINEPHrine (EPIPEN) 0.3 mg/0.3 mL AtIn Inject 0.3 mLs (0.3 mg total) into the muscle once. for 1 dose 0.3 mL 12    EScitalopram oxalate (LEXAPRO) 20 MG tablet TAKE 1 TABLET BY MOUTH EVERY DAY 90 tablet 1    ferrous sulfate 325 (65 FE) MG EC tablet Take 1 tablet (325 mg total) by mouth once daily. 90 tablet 1    fluticasone propionate (FLONASE) 50 mcg/actuation nasal spray INSTILL ONE SPRAY INTO EACH NOSTRIL ONCE DAILY 48 mL 3    folic acid (FOLVITE) 1 MG tablet TAKE 4 TABLETS (4 MG TOTAL) BY MOUTH ONCE DAILY. 100 tablet 3    gabapentin (NEURONTIN) 400 MG capsule Take 1 capsule (400 mg total) by mouth 3 (three) times daily. 90 capsule 3    lancets (ONETOUCH ULTRASOFT LANCETS) Misc 1 each by Misc.(Non-Drug; Combo Route) route 4 (four) times daily. 200 each 2    nortriptyline (PAMELOR) 50 MG capsule Take 1 capsule (50 mg total) by mouth every other day. 45 capsule 0     No current facility-administered medications on file prior to visit.       Allergies:    Wellbutrin [bupropion hcl], Amoxicillin trihydrate, Augmentin [amoxicillin-pot clavulanate], Banana extract, Cat hair standardized allergenic extract, Corn syrup, Pollen extracts, Potassium clavulanate, Rice, Shellfish derived, and [place]    Family or personal history of anesthetic complications: No    Diagnostic Studies: I have reviewed the following relevant findings as noted below:  CBC:  Lab Results   Component Value Date    WBC 13.13 (H) 01/24/2023    HGB 10.5 (L) 01/24/2023    HCT 33.5 (L) 01/24/2023    MCV 86 01/24/2023     01/24/2023       CMP:  Sodium   Date Value Ref Range Status   10/18/2022 136 136 - 145 mmol/L Final     Potassium   Date Value Ref Range Status   10/18/2022 3.5 3.5 - 5.1 mmol/L Final     Chloride   Date Value Ref Range Status   10/18/2022 100 95 - 110 mmol/L Final     CO2   Date Value Ref Range Status   10/18/2022 21 (L) 23 - 29 mmol/L Final     Glucose   Date Value Ref Range Status   10/18/2022 99 70 - 110 mg/dL Final     BUN   Date Value Ref Range Status   10/18/2022 8 6 - 20 mg/dL Final     Creatinine   Date Value Ref Range Status   10/18/2022 0.7 0.5 - 1.4 mg/dL Final     Calcium   Date Value Ref Range Status   10/18/2022 9.7 8.7 - 10.5 mg/dL Final     Total Protein   Date Value Ref Range Status   10/18/2022 7.6 6.0 - 8.4 g/dL Final     Albumin   Date Value Ref Range Status   10/18/2022 3.4 (L) 3.5 - 5.2 g/dL Final     Total Bilirubin   Date Value Ref Range Status   10/18/2022 0.3 0.1 - 1.0 mg/dL Final     Comment:     For infants and newborns, interpretation of results should be based  on gestational age, weight and in agreement with clinical  observations.    Premature Infant recommended reference ranges:  Up to 24 hours.............<8.0 mg/dL  Up to 48 hours............<12.0 mg/dL  3-5 days..................<15.0 mg/dL  6-29 days.................<15.0 mg/dL       Alkaline Phosphatase   Date Value Ref Range Status   10/18/2022 81 55 - 135 U/L Final     AST   Date Value Ref Range Status   10/18/2022 21 10 - 40 U/L Final     ALT   Date Value Ref Range Status   10/18/2022 21 10 - 44 U/L Final     Anion Gap   Date Value Ref Range Status   10/18/2022 15 8 - 16 mmol/L Final     eGFR if    Date Value Ref Range Status   12/13/2021 >60.0 >60 mL/min/1.73 m^2 Final     eGFR if non    Date Value Ref Range Status   12/13/2021 >60.0 >60 mL/min/1.73 m^2 Final     Comment:     Calculation used to obtain the estimated glomerular filtration  rate (eGFR) is the CKD-EPI equation.        BMP:   Lab Results    Component Value Date     10/18/2022    K 3.5 10/18/2022     10/18/2022    CO2 21 (L) 10/18/2022    BUN 8 10/18/2022    CREATININE 0.7 10/18/2022    CALCIUM 9.7 10/18/2022    ANIONGAP 15 10/18/2022    ESTGFRAFRICA >60.0 12/13/2021    EGFRNONAA >60.0 12/13/2021     Coagulation studies:   Lab Results   Component Value Date    INR 0.9 01/22/2013     Echo: No results found in the last 24 hours.  CT: No results found in the last 24 hours.  MRI: No results found in the last 10 years.      Review of Systems:   Constitutional: Negative for fever and chills; well appearing female  Eyes: no visual changes  Ear, nose, mouth and throat: no loose or broken teeth  Cardiovascular: no chest pain  Respiratory: no shortness of breath  Gastrointestinal: no nausea or vomiting  Genitourinary: no dysuria  Musculoskeletal: no joint pain  Skin: warm and dry, no rashes  Neurologic: no seizures  Psychiatric: no anxiety or depression  Endocrinology: no heat or cold intolerance  Hematologic: does not bruise or bleed easily      Physical Exam:  Vitals: see Epic flowsheet  Constitutional: alert, no distress  Eyes: normal lids, pupils symmetric  Ear, nose, mouth and throat: Mallampati I, normal thyromental distance, normal lips, teeth, gums and tongue   Cardiovascular: normal rate, no murmurs  Respiratory: normal effort, no wheezes  Musculoskeletal: normal gait, normal range of motion  Skin: no rashes, no induration  Neurologic: normal reflexes and sensation  Psychiatric: oriented to person, place, time; normal affect    ASA Score: 3    Diagnosis: Arnold Chiari type 1 malformation and Syringomyelia    Assessment and Plan:  Ms. Wright is a 37 year old G1 patient who presents for anesthesia recommendations for delivery given her history of Chiari type 1 malformation and syringomyelia. She has seen Dr. Parsons for recommendations for delivery. It is safe for her to have either a vaginal delivery or a C section. Dr. Parsons does not feel that  she would require a repeat MRI since she's asymptomatic. Her last MRI was from about 10 years ago. She has never had decompressive surgery but Dr. Parsons feels that her risk is quite low for any problems. Therefore the plan as of now is to have the most experienced physician perform her epidural with the intention of not breeching the dura. We discussed that we would have the most experienced provider perform her epidural. However, as an epidural is a blind procedure, there is always a very small risk of a dural puncture.    From Dr. Parsons when asked about epidural/spinal/GETA:  There is not a lot of data on the topic but vaginal delivery in the setting of chiari is safe.  Epidural blockade is helpful to limit valsalva efforts, so should be fine (especially if there is no intradural breech).     That said, if the patient should become symptomatic then I would recommend they do .       Complexity: Moderate    Entertained and answered all questions to the patient's satisfaction.      Kait Ingram MD     Consults

## 2023-03-30 ENCOUNTER — PATIENT MESSAGE (OUTPATIENT)
Dept: OBSTETRICS AND GYNECOLOGY | Facility: CLINIC | Age: 38
End: 2023-03-30
Payer: MEDICAID

## 2023-04-03 ENCOUNTER — PATIENT MESSAGE (OUTPATIENT)
Dept: OTHER | Facility: OTHER | Age: 38
End: 2023-04-03
Payer: MEDICAID

## 2023-04-14 ENCOUNTER — ROUTINE PRENATAL (OUTPATIENT)
Dept: OBSTETRICS AND GYNECOLOGY | Facility: CLINIC | Age: 38
End: 2023-04-14
Payer: MEDICAID

## 2023-04-14 ENCOUNTER — LAB VISIT (OUTPATIENT)
Dept: LAB | Facility: HOSPITAL | Age: 38
End: 2023-04-14
Attending: OBSTETRICS & GYNECOLOGY
Payer: MEDICAID

## 2023-04-14 VITALS — DIASTOLIC BLOOD PRESSURE: 89 MMHG | WEIGHT: 194 LBS | BODY MASS INDEX: 37.89 KG/M2 | SYSTOLIC BLOOD PRESSURE: 128 MMHG

## 2023-04-14 DIAGNOSIS — Z3A.36 36 WEEKS GESTATION OF PREGNANCY: Primary | ICD-10-CM

## 2023-04-14 DIAGNOSIS — O09.513 PRIMIGRAVIDA OF ADVANCED MATERNAL AGE IN THIRD TRIMESTER: ICD-10-CM

## 2023-04-14 DIAGNOSIS — O09.93 SUPERVISION OF HIGH RISK PREGNANCY IN THIRD TRIMESTER: ICD-10-CM

## 2023-04-14 DIAGNOSIS — Z3A.36 36 WEEKS GESTATION OF PREGNANCY: ICD-10-CM

## 2023-04-14 DIAGNOSIS — O24.419 GESTATIONAL DIABETES MELLITUS (GDM) IN THIRD TRIMESTER, GESTATIONAL DIABETES METHOD OF CONTROL UNSPECIFIED: ICD-10-CM

## 2023-04-14 DIAGNOSIS — G93.5 CHIARI MALFORMATION TYPE I: ICD-10-CM

## 2023-04-14 LAB
BASOPHILS # BLD AUTO: 0.03 K/UL (ref 0–0.2)
BASOPHILS NFR BLD: 0.2 % (ref 0–1.9)
DIFFERENTIAL METHOD: ABNORMAL
EOSINOPHIL # BLD AUTO: 0 K/UL (ref 0–0.5)
EOSINOPHIL NFR BLD: 0.3 % (ref 0–8)
ERYTHROCYTE [DISTWIDTH] IN BLOOD BY AUTOMATED COUNT: 14.5 % (ref 11.5–14.5)
HCT VFR BLD AUTO: 37.9 % (ref 37–48.5)
HGB BLD-MCNC: 12 G/DL (ref 12–16)
HIV 1+2 AB+HIV1 P24 AG SERPL QL IA: NORMAL
IMM GRANULOCYTES # BLD AUTO: 0.11 K/UL (ref 0–0.04)
IMM GRANULOCYTES NFR BLD AUTO: 0.8 % (ref 0–0.5)
LYMPHOCYTES # BLD AUTO: 3.1 K/UL (ref 1–4.8)
LYMPHOCYTES NFR BLD: 22.9 % (ref 18–48)
MCH RBC QN AUTO: 26.6 PG (ref 27–31)
MCHC RBC AUTO-ENTMCNC: 31.7 G/DL (ref 32–36)
MCV RBC AUTO: 84 FL (ref 82–98)
MONOCYTES # BLD AUTO: 0.7 K/UL (ref 0.3–1)
MONOCYTES NFR BLD: 4.8 % (ref 4–15)
NEUTROPHILS # BLD AUTO: 9.7 K/UL (ref 1.8–7.7)
NEUTROPHILS NFR BLD: 71 % (ref 38–73)
NRBC BLD-RTO: 0 /100 WBC
PLATELET # BLD AUTO: 375 K/UL (ref 150–450)
PMV BLD AUTO: 11 FL (ref 9.2–12.9)
RBC # BLD AUTO: 4.51 M/UL (ref 4–5.4)
WBC # BLD AUTO: 13.65 K/UL (ref 3.9–12.7)

## 2023-04-14 PROCEDURE — 87081 CULTURE SCREEN ONLY: CPT | Performed by: OBSTETRICS & GYNECOLOGY

## 2023-04-14 PROCEDURE — 99999 PR PBB SHADOW E&M-EST. PATIENT-LVL III: ICD-10-PCS | Mod: PBBFAC,,, | Performed by: OBSTETRICS & GYNECOLOGY

## 2023-04-14 PROCEDURE — 99213 OFFICE O/P EST LOW 20 MIN: CPT | Mod: TH,S$PBB,, | Performed by: OBSTETRICS & GYNECOLOGY

## 2023-04-14 PROCEDURE — 86592 SYPHILIS TEST NON-TREP QUAL: CPT | Performed by: OBSTETRICS & GYNECOLOGY

## 2023-04-14 PROCEDURE — 85025 COMPLETE CBC W/AUTO DIFF WBC: CPT | Performed by: OBSTETRICS & GYNECOLOGY

## 2023-04-14 PROCEDURE — 36415 COLL VENOUS BLD VENIPUNCTURE: CPT | Performed by: OBSTETRICS & GYNECOLOGY

## 2023-04-14 PROCEDURE — 99213 OFFICE O/P EST LOW 20 MIN: CPT | Mod: PBBFAC,TH | Performed by: OBSTETRICS & GYNECOLOGY

## 2023-04-14 PROCEDURE — 87591 N.GONORRHOEAE DNA AMP PROB: CPT | Performed by: OBSTETRICS & GYNECOLOGY

## 2023-04-14 PROCEDURE — 87389 HIV-1 AG W/HIV-1&-2 AB AG IA: CPT | Performed by: OBSTETRICS & GYNECOLOGY

## 2023-04-14 PROCEDURE — 99213 PR OFFICE/OUTPT VISIT, EST, LEVL III, 20-29 MIN: ICD-10-PCS | Mod: TH,S$PBB,, | Performed by: OBSTETRICS & GYNECOLOGY

## 2023-04-14 PROCEDURE — 99999 PR PBB SHADOW E&M-EST. PATIENT-LVL III: CPT | Mod: PBBFAC,,, | Performed by: OBSTETRICS & GYNECOLOGY

## 2023-04-14 RX ORDER — ULIPRISTAL ACETATE 30 MG/1
TABLET ORAL
COMMUNITY
Start: 2023-02-25 | End: 2023-04-14

## 2023-04-14 RX ORDER — NORETHINDRONE ACETATE AND ETHINYL ESTRADIOL AND FERROUS FUMARATE 1MG-20(21)
KIT ORAL
Status: ON HOLD | COMMUNITY
Start: 2023-03-23 | End: 2023-05-01 | Stop reason: HOSPADM

## 2023-04-14 RX ORDER — GLUCOSAM/CHON-MSM1/C/MANG/BOSW 500-416.6
TABLET ORAL
Status: ON HOLD | COMMUNITY
Start: 2023-03-29 | End: 2023-05-01 | Stop reason: HOSPADM

## 2023-04-14 NOTE — PROGRESS NOTES
Patient with no complaints. Denies vaginal bleeding or contractions. Good FM. GBS collected today, 3T labs. Accuchecks within goal 10/23 mildly  elevated at lunch, katt continue diet improvements. Highest 183 x 1. Saw anesthesia, ok with epidural. Labor precautions discused with patient. RTC in 1 week. Low lying placenta resolved on US.     Vitals signs, FHTs, urine dip, and PE findings documented, reviewed and available in OB flow chart.       I spent a total of 20 minutes on the day of the visit.This includes face to face time and non-face to face time preparing to see the patient (eg, review of tests), Obtaining and/or reviewing separately obtained history, Documenting clinical information in the electronic or other health record, Independently interpreting resultsand communicating results to the patient/family/caregiver, or Care coordination.

## 2023-04-15 LAB
C TRACH DNA SPEC QL NAA+PROBE: NOT DETECTED
N GONORRHOEA DNA SPEC QL NAA+PROBE: NOT DETECTED
RPR SER QL: NORMAL

## 2023-04-17 LAB — BACTERIA SPEC AEROBE CULT: NORMAL

## 2023-04-18 ENCOUNTER — PATIENT MESSAGE (OUTPATIENT)
Dept: OBSTETRICS AND GYNECOLOGY | Facility: CLINIC | Age: 38
End: 2023-04-18
Payer: MEDICAID

## 2023-04-19 ENCOUNTER — ROUTINE PRENATAL (OUTPATIENT)
Dept: OBSTETRICS AND GYNECOLOGY | Facility: CLINIC | Age: 38
End: 2023-04-19
Payer: MEDICAID

## 2023-04-19 VITALS
WEIGHT: 194.88 LBS | SYSTOLIC BLOOD PRESSURE: 110 MMHG | DIASTOLIC BLOOD PRESSURE: 68 MMHG | BODY MASS INDEX: 38.06 KG/M2

## 2023-04-19 DIAGNOSIS — O09.513 PRIMIGRAVIDA OF ADVANCED MATERNAL AGE IN THIRD TRIMESTER: ICD-10-CM

## 2023-04-19 DIAGNOSIS — G93.5 CHIARI MALFORMATION TYPE I: ICD-10-CM

## 2023-04-19 DIAGNOSIS — O09.93 SUPERVISION OF HIGH RISK PREGNANCY IN THIRD TRIMESTER: Primary | ICD-10-CM

## 2023-04-19 DIAGNOSIS — O24.419 GESTATIONAL DIABETES MELLITUS (GDM) IN THIRD TRIMESTER, GESTATIONAL DIABETES METHOD OF CONTROL UNSPECIFIED: ICD-10-CM

## 2023-04-19 PROCEDURE — 99213 OFFICE O/P EST LOW 20 MIN: CPT | Mod: PBBFAC,TH | Performed by: OBSTETRICS & GYNECOLOGY

## 2023-04-19 PROCEDURE — 99213 OFFICE O/P EST LOW 20 MIN: CPT | Mod: TH,S$PBB,, | Performed by: OBSTETRICS & GYNECOLOGY

## 2023-04-19 PROCEDURE — 99999 PR PBB SHADOW E&M-EST. PATIENT-LVL III: ICD-10-PCS | Mod: PBBFAC,,, | Performed by: OBSTETRICS & GYNECOLOGY

## 2023-04-19 PROCEDURE — 99213 PR OFFICE/OUTPT VISIT, EST, LEVL III, 20-29 MIN: ICD-10-PCS | Mod: TH,S$PBB,, | Performed by: OBSTETRICS & GYNECOLOGY

## 2023-04-19 PROCEDURE — 99999 PR PBB SHADOW E&M-EST. PATIENT-LVL III: CPT | Mod: PBBFAC,,, | Performed by: OBSTETRICS & GYNECOLOGY

## 2023-04-19 NOTE — PROGRESS NOTES
Patient with no complaints. Denies vaginal bleeding or contractions. Good FM. GBS neg. accuchecks all normal since last visit. IOL ordered 40 weeks.  Labor precautions discused with patient. RTC in 1 week. BP normal today. States elevations at home were taken incorrectly and follow up ones at home were normal.     Vitals signs, FHTs, urine dip, and PE findings documented, reviewed and available in OB flow chart.       I spent a total of 20 minutes on the day of the visit.This includes face to face time and non-face to face time preparing to see the patient (eg, review of tests), Obtaining and/or reviewing separately obtained history, Documenting clinical information in the electronic or other health record, Independently interpreting resultsand communicating results to the patient/family/caregiver, or Care coordination.

## 2023-04-25 ENCOUNTER — ROUTINE PRENATAL (OUTPATIENT)
Dept: OBSTETRICS AND GYNECOLOGY | Facility: CLINIC | Age: 38
End: 2023-04-25
Payer: MEDICAID

## 2023-04-25 ENCOUNTER — HOSPITAL ENCOUNTER (EMERGENCY)
Facility: OTHER | Age: 38
Discharge: HOME | End: 2023-04-25
Attending: OBSTETRICS & GYNECOLOGY
Payer: MEDICAID

## 2023-04-25 VITALS
RESPIRATION RATE: 18 BRPM | DIASTOLIC BLOOD PRESSURE: 82 MMHG | OXYGEN SATURATION: 98 % | HEART RATE: 81 BPM | TEMPERATURE: 98 F | SYSTOLIC BLOOD PRESSURE: 130 MMHG

## 2023-04-25 VITALS
WEIGHT: 194.69 LBS | DIASTOLIC BLOOD PRESSURE: 94 MMHG | BODY MASS INDEX: 38.02 KG/M2 | SYSTOLIC BLOOD PRESSURE: 140 MMHG

## 2023-04-25 DIAGNOSIS — O16.9 ELEVATED BLOOD PRESSURE AFFECTING PREGNANCY, ANTEPARTUM: ICD-10-CM

## 2023-04-25 DIAGNOSIS — R03.0 ELEVATED BLOOD PRESSURE READING: ICD-10-CM

## 2023-04-25 DIAGNOSIS — G93.5 CHIARI MALFORMATION TYPE I: ICD-10-CM

## 2023-04-25 DIAGNOSIS — O24.419 GESTATIONAL DIABETES MELLITUS (GDM) IN THIRD TRIMESTER, GESTATIONAL DIABETES METHOD OF CONTROL UNSPECIFIED: ICD-10-CM

## 2023-04-25 DIAGNOSIS — O09.93 SUPERVISION OF HIGH RISK PREGNANCY IN THIRD TRIMESTER: Primary | ICD-10-CM

## 2023-04-25 DIAGNOSIS — O09.513 PRIMIGRAVIDA OF ADVANCED MATERNAL AGE IN THIRD TRIMESTER: ICD-10-CM

## 2023-04-25 DIAGNOSIS — Z3A.38 38 WEEKS GESTATION OF PREGNANCY: Primary | ICD-10-CM

## 2023-04-25 LAB
ABO + RH BLD: NORMAL
ALBUMIN SERPL BCP-MCNC: 2.6 G/DL (ref 3.5–5.2)
ALP SERPL-CCNC: 144 U/L (ref 55–135)
ALT SERPL W/O P-5'-P-CCNC: 11 U/L (ref 10–44)
ANION GAP SERPL CALC-SCNC: 9 MMOL/L (ref 8–16)
AST SERPL-CCNC: 18 U/L (ref 10–40)
BASOPHILS # BLD AUTO: 0.03 K/UL (ref 0–0.2)
BASOPHILS NFR BLD: 0.3 % (ref 0–1.9)
BILIRUB SERPL-MCNC: 0.3 MG/DL (ref 0.1–1)
BILIRUB SERPL-MCNC: NEGATIVE MG/DL
BLD GP AB SCN CELLS X3 SERPL QL: NORMAL
BLOOD URINE, POC: NEGATIVE
BUN SERPL-MCNC: 9 MG/DL (ref 6–20)
CALCIUM SERPL-MCNC: 9.7 MG/DL (ref 8.7–10.5)
CHLORIDE SERPL-SCNC: 106 MMOL/L (ref 95–110)
CO2 SERPL-SCNC: 20 MMOL/L (ref 23–29)
COLOR, POC UA: NORMAL
CREAT SERPL-MCNC: 0.8 MG/DL (ref 0.5–1.4)
CREAT UR-MCNC: 86.8 MG/DL (ref 15–325)
DIFFERENTIAL METHOD: ABNORMAL
EOSINOPHIL # BLD AUTO: 0 K/UL (ref 0–0.5)
EOSINOPHIL NFR BLD: 0.3 % (ref 0–8)
ERYTHROCYTE [DISTWIDTH] IN BLOOD BY AUTOMATED COUNT: 15.1 % (ref 11.5–14.5)
EST. GFR  (NO RACE VARIABLE): >60 ML/MIN/1.73 M^2
GLUCOSE SERPL-MCNC: 69 MG/DL (ref 70–110)
GLUCOSE UR QL STRIP: NORMAL
HCT VFR BLD AUTO: 38.4 % (ref 37–48.5)
HGB BLD-MCNC: 12.3 G/DL (ref 12–16)
IMM GRANULOCYTES # BLD AUTO: 0.07 K/UL (ref 0–0.04)
IMM GRANULOCYTES NFR BLD AUTO: 0.6 % (ref 0–0.5)
KETONES UR QL STRIP: NEGATIVE
LEUKOCYTE ESTERASE URINE, POC: NEGATIVE
LYMPHOCYTES # BLD AUTO: 3.2 K/UL (ref 1–4.8)
LYMPHOCYTES NFR BLD: 27.7 % (ref 18–48)
MCH RBC QN AUTO: 27 PG (ref 27–31)
MCHC RBC AUTO-ENTMCNC: 32 G/DL (ref 32–36)
MCV RBC AUTO: 84 FL (ref 82–98)
MONOCYTES # BLD AUTO: 0.5 K/UL (ref 0.3–1)
MONOCYTES NFR BLD: 4.3 % (ref 4–15)
NEUTROPHILS # BLD AUTO: 7.7 K/UL (ref 1.8–7.7)
NEUTROPHILS NFR BLD: 66.8 % (ref 38–73)
NITRITE, POC UA: NEGATIVE
NRBC BLD-RTO: 0 /100 WBC
PH, POC UA: 7
PLATELET # BLD AUTO: 308 K/UL (ref 150–450)
PMV BLD AUTO: 10.9 FL (ref 9.2–12.9)
POTASSIUM SERPL-SCNC: 4.3 MMOL/L (ref 3.5–5.1)
PROT SERPL-MCNC: 6.8 G/DL (ref 6–8.4)
PROT UR-MCNC: 10 MG/DL (ref 0–15)
PROT/CREAT UR: 0.12 MG/G{CREAT} (ref 0–0.2)
PROTEIN, POC: NORMAL
RBC # BLD AUTO: 4.55 M/UL (ref 4–5.4)
SODIUM SERPL-SCNC: 135 MMOL/L (ref 136–145)
SPECIFIC GRAVITY, POC UA: 1.01
SPECIMEN OUTDATE: NORMAL
UROBILINOGEN, POC UA: NORMAL
WBC # BLD AUTO: 11.5 K/UL (ref 3.9–12.7)

## 2023-04-25 PROCEDURE — 59025 FETAL NON-STRESS TEST: CPT | Mod: 26,,, | Performed by: OBSTETRICS & GYNECOLOGY

## 2023-04-25 PROCEDURE — 99285 EMERGENCY DEPT VISIT HI MDM: CPT | Mod: 25,,, | Performed by: OBSTETRICS & GYNECOLOGY

## 2023-04-25 PROCEDURE — 99213 OFFICE O/P EST LOW 20 MIN: CPT | Mod: PBBFAC,TH,25 | Performed by: OBSTETRICS & GYNECOLOGY

## 2023-04-25 PROCEDURE — 99284 EMERGENCY DEPT VISIT MOD MDM: CPT | Mod: 25,27

## 2023-04-25 PROCEDURE — 99999 PR PBB SHADOW E&M-EST. PATIENT-LVL III: ICD-10-PCS | Mod: PBBFAC,,, | Performed by: OBSTETRICS & GYNECOLOGY

## 2023-04-25 PROCEDURE — 82570 ASSAY OF URINE CREATININE: CPT

## 2023-04-25 PROCEDURE — 86900 BLOOD TYPING SEROLOGIC ABO: CPT | Performed by: OBSTETRICS & GYNECOLOGY

## 2023-04-25 PROCEDURE — 99213 OFFICE O/P EST LOW 20 MIN: CPT | Mod: 25,TH,S$PBB, | Performed by: OBSTETRICS & GYNECOLOGY

## 2023-04-25 PROCEDURE — 59025 PR FETAL 2N-STRESS TEST: ICD-10-PCS | Mod: 26,,, | Performed by: OBSTETRICS & GYNECOLOGY

## 2023-04-25 PROCEDURE — 85025 COMPLETE CBC W/AUTO DIFF WBC: CPT

## 2023-04-25 PROCEDURE — 80053 COMPREHEN METABOLIC PANEL: CPT

## 2023-04-25 PROCEDURE — 59025 FETAL NON-STRESS TEST: CPT

## 2023-04-25 PROCEDURE — 99285 PR EMERGENCY DEPT VISIT,LEVEL V: ICD-10-PCS | Mod: 25,,, | Performed by: OBSTETRICS & GYNECOLOGY

## 2023-04-25 PROCEDURE — 99213 PR OFFICE/OUTPT VISIT, EST, LEVL III, 20-29 MIN: ICD-10-PCS | Mod: 25,TH,S$PBB, | Performed by: OBSTETRICS & GYNECOLOGY

## 2023-04-25 PROCEDURE — 99999 PR PBB SHADOW E&M-EST. PATIENT-LVL III: CPT | Mod: PBBFAC,,, | Performed by: OBSTETRICS & GYNECOLOGY

## 2023-04-25 NOTE — PROGRESS NOTES
Good FM, no LOF, Ctx, VB. BP elevated x 2 today, no PreE symptoms, Will send to [facility] for labs, BP, Dr. Das notified. Accuchecks normal today overall.     Given precautions.

## 2023-04-25 NOTE — ED PROVIDER NOTES
Encounter Date: 2023       History     Chief Complaint   Patient presents with    Hypertension     HPI  Can Wright is a 37 y.o. [id number] at 38w1d presents complaining of elevated blood pressures at clinic. Patient presented to primary OB today and was noted to have a blood pressure of 140/92 at 0856. She denies headache, vision changes, CP, SOB, and RUQ pain.      This IUP is complicated by maternal chiari malforation type 1 (cleared for  by neurosurgery), A1GDM, AMA, and maternal obesity (pre preg BMI <40) .  Patient denies contractions, denies vaginal bleeding, denies LOF.   Fetal Movement: normal.     Review of patient's allergies indicates:   Allergen Reactions    Wellbutrin [bupropion hcl] Anxiety     Worsened anxiety    Amoxicillin trihydrate      Other reaction(s): severe stomach pain    Augmentin [amoxicillin-pot clavulanate] Nausea Only    Banana extract      Other reaction(s): swelling, itchy lips    Cat hair standardized allergenic extract      Other reaction(s): itchy eyes    Corn syrup      Other reaction(s): Unknown    Pollen extracts      Other reaction(s): sneezing, stuffy nose    Potassium clavulanate      Other reaction(s): severe stomach pain    Rice      Other reaction(s): Unknown    Shellfish derived      Other reaction(s): Anaphylaxis    [place]      Other reaction(s): itchy throat     Past Medical History:   Diagnosis Date    ADD (attention deficit disorder)     Allergy     Anxiety     , nortriptyline 50 mg nightly, Lexapro 20 q.d., propranolol 10 mg prn    Attention deficit hyperactivity disorder (ADHD), combined type 2014    Carpal tunnel syndrome on both sides     Neurontin 400 mg 3 times a day for carpal tunnel & arthritis. Bilateral injections Dr Talbert, Celebrex expensive    Chiari I malformation     Dysthymic disorder     lexapro    Episodic paroxysmal hemicrania, not intractable 2022    Hyperlipidemia     Shellfish allergy     epi pen     Syringomyelia     asymptomatic, released from Dr Lyon, steroids worked initially, tx Celebrex, Neurontin, Tyl #3 (helps prn), Dr Samia braswell recommended surgery     Past Surgical History:   Procedure Laterality Date    WISDOM TOOTH EXTRACTION       Family History   Problem Relation Age of Onset    Hyperlipidemia Mother     Fibromyalgia Mother     Arthritis Mother     Depression Father     Arthritis Maternal Grandmother     Heart disease Maternal Grandfather     Cancer Maternal Uncle         liver     Social History     Tobacco Use    Smoking status: Former     Packs/day: 0.50     Years: 7.00     Pack years: 3.50     Types: Cigarettes     Start date: 6/2/2016    Smokeless tobacco: Never   Substance Use Topics    Alcohol use: Yes     Alcohol/week: 1.0 standard drink     Types: 1 Cans of beer per week    Drug use: No     Review of Systems   Constitutional:  Negative for fever.   HENT:  Negative for congestion, sinus pain and sore throat.    Respiratory:  Negative for shortness of breath.    Cardiovascular:  Negative for chest pain.   Gastrointestinal:  Negative for abdominal pain and diarrhea.   Genitourinary:  Negative for difficulty urinating, dysuria, pelvic pain and vaginal bleeding.   Skin:  Negative for color change.   Neurological:  Negative for light-headedness and headaches.   Psychiatric/Behavioral:  Negative for confusion.    All other systems reviewed and are negative.    Physical Exam     Initial Vitals   BP Pulse Resp Temp SpO2   04/25/23 1145 04/25/23 1140 04/25/23 1145 04/25/23 1145 04/25/23 1140   129/82 88 18 98.1 °F (36.7 °C) 98 %      MAP       --                Physical Exam    Constitutional: She appears well-developed and well-nourished. No distress.   HENT:   Head: Normocephalic and atraumatic.   Eyes: EOM are normal.   Cardiovascular:  Normal rate.           Pulmonary/Chest: She has no wheezes.   Abdominal: Abdomen is soft. There is no abdominal tenderness.   Genitourinary:     Genitourinary Comments: Pelvic deferred     Musculoskeletal:         General: Normal range of motion.     Neurological: She is alert and oriented to person, place, and time.   Skin: Skin is warm, dry and intact. No rash noted.   Psychiatric: She has a normal mood and affect. Her speech is normal and behavior is normal.       ED Course   Fetal non-stress test    Date/Time: 2023 11:45 AM  Performed by: Lelia Burgess MD  Authorized by: Bren Ham MD     Nonstress Test:     Variability:  6-25 BPM    Decelerations:  None    Accelerations:  15 bpm    Acoustic Stimulator: No      Baseline:  135    Uterine Irritability: No      Contractions:  Not present  Biophysical Profile:     Nonstress Test Interpretation: reactive      Overall Impression:  Reassuring  Post-procedure:     Patient tolerance:  Patient tolerated the procedure well with no immediate complications  Labs Reviewed   COMPREHENSIVE METABOLIC PANEL - Abnormal; Notable for the following components:       Result Value    Sodium 135 (*)     CO2 20 (*)     Glucose 69 (*)     Albumin 2.6 (*)     Alkaline Phosphatase 144 (*)     All other components within normal limits   CBC W/ AUTO DIFFERENTIAL - Abnormal; Notable for the following components:    RDW 15.1 (*)     Immature Granulocytes 0.6 (*)     Immature Grans (Abs) 0.07 (*)     All other components within normal limits   PROTEIN / CREATININE RATIO, URINE    Narrative:     Specimen Source->Urine   POCT URINALYSIS, DIPSTICK OR TABLET REAGENT, AUTOMATED, WITH MICROSCOP   TYPE & SCREEN        Recent Labs   Lab 23  1207 23  1210   WBC 11.50  --    HGB 12.3  --    HCT 38.4  --      --    BUN 9  --    CREATININE 0.8  --    ALT 11  --    AST 18  --    UTPCR  --  0.12            Imaging Results    None          Medications - No data to display  Medical Decision Making:   ED Management:  37 y.o.  art 38w1d here for elevated blood pressure in clinic   - 140/94 in clinic   -  Normotensive here in [facility] (BP: (122-140)/(80-94) 130/82)   - Asymptomatic from pre-e symptoms   - Pre-e labs: 12/12/38/308, Cr. 0.8 (pt baseline), ALT/AST 11/18 PCR 0.12  - NST R&R   - [place] quiet   - MVP 5.82cm   - Pre-e and labor precautions provided  - IOL scheduled for 05/07          Attending Attestation:   Physician Attestation Statement for Resident:  As the supervising MD   Physician Attestation Statement: I have personally seen and examined this patient.   I agree with the above history.  -:   As the supervising MD I agree with the above PE.     As the supervising MD I agree with the above treatment, course, plan, and disposition.   -:   NST  I independently reviewed the fetal non-stress test with the following interpretation:  135 BPM baseline  Variability: moderate  Accelerations: present  Decelerations: absent  Contractions: none  Category 1    Clinical Interpretation:reactive    Patient evaluated and found to be stable, agree with resident's assessment and plan.  Labs wnl. All Bp in [facility] wnl.  Pre E precautions reviewed.    I was personally present during the critical portions of the procedure(s) performed by the resident and was immediately available in the ED to provide services and assistance as needed during the entire procedure.  I have reviewed and agree with the residents interpretation of the following: lab data.  I have reviewed the following: old records at this facility.                           Clinical Impression:   Final diagnoses:  [Z3A.38] 38 weeks gestation of pregnancy (Primary)        ED Disposition Condition    Discharge Stable          ED Prescriptions    None       Follow-up Information    None          Lelia Burgess MD  Resident  04/25/23 [id number]       Bren Ham MD  04/25/23 [id number]

## 2023-04-26 ENCOUNTER — PATIENT MESSAGE (OUTPATIENT)
Dept: OBSTETRICS AND GYNECOLOGY | Facility: CLINIC | Age: 38
End: 2023-04-26
Payer: MEDICAID

## 2023-04-27 ENCOUNTER — TELEPHONE (OUTPATIENT)
Dept: OBSTETRICS AND GYNECOLOGY | Facility: OTHER | Age: 38
End: 2023-04-27
Payer: MEDICAID

## 2023-04-27 ENCOUNTER — ANESTHESIA (OUTPATIENT)
Dept: OBSTETRICS AND GYNECOLOGY | Facility: OTHER | Age: 38
End: 2023-04-27
Payer: MEDICAID

## 2023-04-27 ENCOUNTER — ANESTHESIA EVENT (OUTPATIENT)
Dept: OBSTETRICS AND GYNECOLOGY | Facility: OTHER | Age: 38
End: 2023-04-27
Payer: MEDICAID

## 2023-04-27 ENCOUNTER — HOSPITAL ENCOUNTER (INPATIENT)
Facility: OTHER | Age: 38
LOS: 5 days | Discharge: HOME | End: 2023-05-02
Attending: OBSTETRICS & GYNECOLOGY | Admitting: OBSTETRICS & GYNECOLOGY
Payer: MEDICAID

## 2023-04-27 DIAGNOSIS — Z34.90 ENCOUNTER FOR INDUCTION OF LABOR: ICD-10-CM

## 2023-04-27 DIAGNOSIS — O13.3 GESTATIONAL HYPERTENSION, THIRD TRIMESTER: Primary | ICD-10-CM

## 2023-04-27 DIAGNOSIS — Z3A.38 38 WEEKS GESTATION OF PREGNANCY: ICD-10-CM

## 2023-04-27 PROBLEM — E66.812 CLASS 2 OBESITY DUE TO EXCESS CALORIES WITHOUT SERIOUS COMORBIDITY WITH BODY MASS INDEX (BMI) OF 37.0 TO 37.9 IN ADULT: Status: ACTIVE | Noted: 2023-04-27

## 2023-04-27 PROBLEM — Z34.92 SECOND TRIMESTER PREGNANCY: Status: RESOLVED | Noted: 2023-02-16 | Resolved: 2023-04-27

## 2023-04-27 PROBLEM — E66.09 CLASS 2 OBESITY DUE TO EXCESS CALORIES WITHOUT SERIOUS COMORBIDITY WITH BODY MASS INDEX (BMI) OF 37.0 TO 37.9 IN ADULT: Status: ACTIVE | Noted: 2023-04-27

## 2023-04-27 PROBLEM — O24.410 DIET CONTROLLED GESTATIONAL DIABETES MELLITUS (GDM) IN THIRD TRIMESTER: Status: ACTIVE | Noted: 2023-04-27

## 2023-04-27 PROBLEM — O99.343 MENTAL DISORDER AFFECTING PREGNANCY IN THIRD TRIMESTER: Status: ACTIVE | Noted: 2023-04-27

## 2023-04-27 PROBLEM — O99.213 OBESITY AFFECTING PREGNANCY IN THIRD TRIMESTER: Status: ACTIVE | Noted: 2023-04-27

## 2023-04-27 PROBLEM — F41.8 MIXED ANXIETY DEPRESSIVE DISORDER: Status: ACTIVE | Noted: 2023-04-27

## 2023-04-27 LAB
ABO + RH BLD: NORMAL
ALBUMIN SERPL BCP-MCNC: 2.5 G/DL (ref 3.5–5.2)
ALP SERPL-CCNC: 135 U/L (ref 55–135)
ALT SERPL W/O P-5'-P-CCNC: 14 U/L (ref 10–44)
ANION GAP SERPL CALC-SCNC: 11 MMOL/L (ref 8–16)
AST SERPL-CCNC: 15 U/L (ref 10–40)
BASOPHILS # BLD AUTO: 0.03 K/UL (ref 0–0.2)
BASOPHILS NFR BLD: 0.2 % (ref 0–1.9)
BILIRUB SERPL-MCNC: 0.2 MG/DL (ref 0.1–1)
BILIRUB SERPL-MCNC: NORMAL MG/DL
BLD GP AB SCN CELLS X3 SERPL QL: NORMAL
BLOOD URINE, POC: NORMAL
BUN SERPL-MCNC: 11 MG/DL (ref 6–20)
CALCIUM SERPL-MCNC: 9.4 MG/DL (ref 8.7–10.5)
CHLORIDE SERPL-SCNC: 108 MMOL/L (ref 95–110)
CO2 SERPL-SCNC: 17 MMOL/L (ref 23–29)
COLOR, POC UA: YELLOW
CREAT SERPL-MCNC: 0.8 MG/DL (ref 0.5–1.4)
CREAT UR-MCNC: 174.3 MG/DL (ref 15–325)
DIFFERENTIAL METHOD: ABNORMAL
EOSINOPHIL # BLD AUTO: 0 K/UL (ref 0–0.5)
EOSINOPHIL NFR BLD: 0.2 % (ref 0–8)
ERYTHROCYTE [DISTWIDTH] IN BLOOD BY AUTOMATED COUNT: 15.1 % (ref 11.5–14.5)
EST. GFR  (NO RACE VARIABLE): >60 ML/MIN/1.73 M^2
GLUCOSE SERPL-MCNC: 175 MG/DL (ref 70–110)
GLUCOSE UR QL STRIP: NORMAL
HCT VFR BLD AUTO: 37.7 % (ref 37–48.5)
HGB BLD-MCNC: 12.2 G/DL (ref 12–16)
IMM GRANULOCYTES # BLD AUTO: 0.08 K/UL (ref 0–0.04)
IMM GRANULOCYTES NFR BLD AUTO: 0.6 % (ref 0–0.5)
KETONES UR QL STRIP: NORMAL
LEUKOCYTE ESTERASE URINE, POC: NORMAL
LYMPHOCYTES # BLD AUTO: 3.6 K/UL (ref 1–4.8)
LYMPHOCYTES NFR BLD: 25.4 % (ref 18–48)
MCH RBC QN AUTO: 26.9 PG (ref 27–31)
MCHC RBC AUTO-ENTMCNC: 32.4 G/DL (ref 32–36)
MCV RBC AUTO: 83 FL (ref 82–98)
MONOCYTES # BLD AUTO: 0.6 K/UL (ref 0.3–1)
MONOCYTES NFR BLD: 4 % (ref 4–15)
NEUTROPHILS # BLD AUTO: 9.8 K/UL (ref 1.8–7.7)
NEUTROPHILS NFR BLD: 69.6 % (ref 38–73)
NITRITE, POC UA: NORMAL
NRBC BLD-RTO: 0 /100 WBC
PH, POC UA: 6
PLATELET # BLD AUTO: 304 K/UL (ref 150–450)
PMV BLD AUTO: 11.1 FL (ref 9.2–12.9)
POCT GLUCOSE: 128 MG/DL (ref 70–110)
POTASSIUM SERPL-SCNC: 3.4 MMOL/L (ref 3.5–5.1)
PROT SERPL-MCNC: 6.8 G/DL (ref 6–8.4)
PROT UR-MCNC: 19 MG/DL (ref 0–15)
PROT/CREAT UR: 0.11 MG/G{CREAT} (ref 0–0.2)
PROTEIN, POC: NORMAL
RBC # BLD AUTO: 4.53 M/UL (ref 4–5.4)
SODIUM SERPL-SCNC: 136 MMOL/L (ref 136–145)
SPECIFIC GRAVITY, POC UA: 1.02
SPECIMEN OUTDATE: NORMAL
UROBILINOGEN, POC UA: NORMAL
WBC # BLD AUTO: 14.13 K/UL (ref 3.9–12.7)

## 2023-04-27 PROCEDURE — 80053 COMPREHEN METABOLIC PANEL: CPT | Performed by: STUDENT IN AN ORGANIZED HEALTH CARE EDUCATION/TRAINING PROGRAM

## 2023-04-27 PROCEDURE — 63600175 PHARM REV CODE 636 W HCPCS

## 2023-04-27 PROCEDURE — 86900 BLOOD TYPING SEROLOGIC ABO: CPT | Performed by: STUDENT IN AN ORGANIZED HEALTH CARE EDUCATION/TRAINING PROGRAM

## 2023-04-27 PROCEDURE — 99285 EMERGENCY DEPT VISIT HI MDM: CPT | Mod: 25

## 2023-04-27 PROCEDURE — 59025 FETAL NON-STRESS TEST: CPT | Mod: 26,,, | Performed by: OBSTETRICS & GYNECOLOGY

## 2023-04-27 PROCEDURE — 82570 ASSAY OF URINE CREATININE: CPT | Performed by: STUDENT IN AN ORGANIZED HEALTH CARE EDUCATION/TRAINING PROGRAM

## 2023-04-27 PROCEDURE — 59025 FETAL NON-STRESS TEST: CPT

## 2023-04-27 PROCEDURE — 59025 PR FETAL 2N-STRESS TEST: ICD-10-PCS | Mod: 26,,, | Performed by: OBSTETRICS & GYNECOLOGY

## 2023-04-27 PROCEDURE — 99284 EMERGENCY DEPT VISIT MOD MDM: CPT | Mod: 25,,, | Performed by: OBSTETRICS & GYNECOLOGY

## 2023-04-27 PROCEDURE — 99284 PR EMERGENCY DEPT VISIT,LEVEL IV: ICD-10-PCS | Mod: 25,,, | Performed by: OBSTETRICS & GYNECOLOGY

## 2023-04-27 PROCEDURE — 11000001 HC ACUTE MED/SURG PRIVATE ROOM

## 2023-04-27 PROCEDURE — 85025 COMPLETE CBC W/AUTO DIFF WBC: CPT | Performed by: STUDENT IN AN ORGANIZED HEALTH CARE EDUCATION/TRAINING PROGRAM

## 2023-04-27 PROCEDURE — 25000003 PHARM REV CODE 250

## 2023-04-27 RX ORDER — ONDANSETRON 8 MG/1
8 TABLET, ORALLY DISINTEGRATING ORAL EVERY 8 HOURS PRN
Status: DISCONTINUED | OUTPATIENT
Start: 2023-04-27 | End: 2023-04-29

## 2023-04-27 RX ORDER — SIMETHICONE 80 MG
1 TABLET,CHEWABLE ORAL 4 TIMES DAILY PRN
Status: DISCONTINUED | OUTPATIENT
Start: 2023-04-27 | End: 2023-04-29

## 2023-04-27 RX ORDER — ESCITALOPRAM OXALATE 10 MG/1
20 TABLET ORAL DAILY
Status: DISCONTINUED | OUTPATIENT
Start: 2023-04-28 | End: 2023-04-28

## 2023-04-27 RX ORDER — OXYTOCIN/RINGER'S LACTATE 30/500 ML
334 PLASTIC BAG, INJECTION (ML) INTRAVENOUS ONCE AS NEEDED
Status: DISCONTINUED | OUTPATIENT
Start: 2023-04-27 | End: 2023-04-29

## 2023-04-27 RX ORDER — OXYTOCIN 10 [USP'U]/ML
10 INJECTION, SOLUTION INTRAMUSCULAR; INTRAVENOUS ONCE AS NEEDED
Status: DISCONTINUED | OUTPATIENT
Start: 2023-04-27 | End: 2023-04-29

## 2023-04-27 RX ORDER — MISOPROSTOL 200 UG/1
800 TABLET ORAL ONCE AS NEEDED
Status: DISCONTINUED | OUTPATIENT
Start: 2023-04-27 | End: 2023-04-29

## 2023-04-27 RX ORDER — OXYTOCIN/RINGER'S LACTATE 30/500 ML
334 PLASTIC BAG, INJECTION (ML) INTRAVENOUS ONCE
Status: DISCONTINUED | OUTPATIENT
Start: 2023-04-27 | End: 2023-04-29

## 2023-04-27 RX ORDER — CARBOPROST TROMETHAMINE 250 UG/ML
250 INJECTION, SOLUTION INTRAMUSCULAR
Status: DISCONTINUED | OUTPATIENT
Start: 2023-04-27 | End: 2023-04-29

## 2023-04-27 RX ORDER — ACETAMINOPHEN 325 MG/1
650 TABLET ORAL EVERY 6 HOURS PRN
Status: DISCONTINUED | OUTPATIENT
Start: 2023-04-27 | End: 2023-04-29

## 2023-04-27 RX ORDER — METHYLERGONOVINE MALEATE 0.2 MG/ML
200 INJECTION INTRAVENOUS
Status: DISCONTINUED | OUTPATIENT
Start: 2023-04-27 | End: 2023-04-29

## 2023-04-27 RX ORDER — SODIUM CHLORIDE 9 MG/ML
INJECTION, SOLUTION INTRAVENOUS
Status: DISCONTINUED | OUTPATIENT
Start: 2023-04-27 | End: 2023-04-29

## 2023-04-27 RX ORDER — CALCIUM CARBONATE 200(500)MG
500 TABLET,CHEWABLE ORAL 3 TIMES DAILY PRN
Status: DISCONTINUED | OUTPATIENT
Start: 2023-04-27 | End: 2023-04-29

## 2023-04-27 RX ORDER — TRANEXAMIC ACID 10 MG/ML
1000 INJECTION, SOLUTION INTRAVENOUS ONCE AS NEEDED
Status: DISCONTINUED | OUTPATIENT
Start: 2023-04-27 | End: 2023-04-29

## 2023-04-27 RX ORDER — LIDOCAINE HYDROCHLORIDE 10 MG/ML
10 INJECTION INFILTRATION; PERINEURAL ONCE AS NEEDED
Status: DISCONTINUED | OUTPATIENT
Start: 2023-04-27 | End: 2023-04-29

## 2023-04-27 RX ORDER — SODIUM CHLORIDE, SODIUM LACTATE, POTASSIUM CHLORIDE, CALCIUM CHLORIDE 600; 310; 30; 20 MG/100ML; MG/100ML; MG/100ML; MG/100ML
INJECTION, SOLUTION INTRAVENOUS CONTINUOUS
Status: DISCONTINUED | OUTPATIENT
Start: 2023-04-27 | End: 2023-04-29

## 2023-04-27 RX ORDER — OXYTOCIN/RINGER'S LACTATE 30/500 ML
0-30 PLASTIC BAG, INJECTION (ML) INTRAVENOUS CONTINUOUS
Status: DISCONTINUED | OUTPATIENT
Start: 2023-04-27 | End: 2023-04-27

## 2023-04-27 RX ORDER — OXYTOCIN/RINGER'S LACTATE 30/500 ML
95 PLASTIC BAG, INJECTION (ML) INTRAVENOUS ONCE AS NEEDED
Status: COMPLETED | OUTPATIENT
Start: 2023-04-27 | End: 2023-04-29

## 2023-04-27 RX ORDER — PROCHLORPERAZINE EDISYLATE 5 MG/ML
5 INJECTION INTRAMUSCULAR; INTRAVENOUS EVERY 6 HOURS PRN
Status: DISCONTINUED | OUTPATIENT
Start: 2023-04-27 | End: 2023-04-29

## 2023-04-27 RX ORDER — CLINDAMYCIN PHOSPHATE 900 MG/50ML
900 INJECTION, SOLUTION INTRAVENOUS ONCE AS NEEDED
Status: COMPLETED | OUTPATIENT
Start: 2023-04-27 | End: 2023-04-29

## 2023-04-27 RX ORDER — OXYTOCIN/RINGER'S LACTATE 30/500 ML
95 PLASTIC BAG, INJECTION (ML) INTRAVENOUS ONCE
Status: DISCONTINUED | OUTPATIENT
Start: 2023-04-27 | End: 2023-04-29

## 2023-04-27 RX ADMIN — SODIUM CHLORIDE, POTASSIUM CHLORIDE, SODIUM LACTATE AND CALCIUM CHLORIDE: 600; 310; 30; 20 INJECTION, SOLUTION INTRAVENOUS at 08:04

## 2023-04-27 RX ADMIN — MISOPROSTOL 50 MCG: 100 TABLET ORAL at 09:04

## 2023-04-27 NOTE — Clinical Note
I certify that Inpatient services for greater than or equal to 2 midnights are medically necessary:: Yes   Transfer To (Destination): [facility] LABOR AND DELIVERY [[id number]]   Diagnosis: Encounter for induction of labor [[id number]]   Admitting Provider:: JOSE JERRY [[id number]]   Future Attending Provider: JEANSONNE, JULIE R. [[id number]]   Reason for IP Medical Treatment  (Clinical interventions that can only be accomplished in the IP setting? ) :: Labor   Estimated Length of Stay:: 2 midnights   Plans for Post-Acute care--if anticipated (pick the single best option):: A. No post acute care anticipated at this time   Special Needs:: No Special Needs [1]

## 2023-04-28 LAB
POCT GLUCOSE: 65 MG/DL (ref 70–110)
POCT GLUCOSE: 73 MG/DL (ref 70–110)
POCT GLUCOSE: 84 MG/DL (ref 70–110)
POCT GLUCOSE: 90 MG/DL (ref 70–110)
POCT GLUCOSE: 94 MG/DL (ref 70–110)

## 2023-04-28 PROCEDURE — 01968 PR INSERT CATH,ART,PERCUT,SHORTTERM: ICD-10-PCS | Mod: AA,,, | Performed by: ANESTHESIOLOGY

## 2023-04-28 PROCEDURE — C1751 CATH, INF, PER/CENT/MIDLINE: HCPCS | Performed by: ANESTHESIOLOGY

## 2023-04-28 PROCEDURE — 51702 INSERT TEMP BLADDER CATH: CPT

## 2023-04-28 PROCEDURE — 25000003 PHARM REV CODE 250

## 2023-04-28 PROCEDURE — 59409 PRA ETRICAL CARE,VAG DELIV ONLY: ICD-10-PCS | Mod: AA,,, | Performed by: ANESTHESIOLOGY

## 2023-04-28 PROCEDURE — 25000003 PHARM REV CODE 250: Performed by: STUDENT IN AN ORGANIZED HEALTH CARE EDUCATION/TRAINING PROGRAM

## 2023-04-28 PROCEDURE — 27200710 HC EPIDURAL INFUSION PUMP SET: Performed by: ANESTHESIOLOGY

## 2023-04-28 PROCEDURE — 11000001 HC ACUTE MED/SURG PRIVATE ROOM

## 2023-04-28 PROCEDURE — 59409 OBSTETRICAL CARE: CPT | Mod: AA,,, | Performed by: ANESTHESIOLOGY

## 2023-04-28 PROCEDURE — 01968 ANES/ANALG CS DLVR NEURAXIAL: CPT | Mod: AA,,, | Performed by: ANESTHESIOLOGY

## 2023-04-28 PROCEDURE — 72100002 HC LABOR CARE, 1ST 8 HOURS

## 2023-04-28 PROCEDURE — 63600175 PHARM REV CODE 636 W HCPCS

## 2023-04-28 PROCEDURE — 62326 NJX INTERLAMINAR LMBR/SAC: CPT | Performed by: STUDENT IN AN ORGANIZED HEALTH CARE EDUCATION/TRAINING PROGRAM

## 2023-04-28 RX ORDER — FENTANYL/BUPIVACAINE/NS/PF 2MCG/ML-.1
PLASTIC BAG, INJECTION (ML) INJECTION
Status: DISPENSED
Start: 2023-04-28 | End: 2023-04-29

## 2023-04-28 RX ORDER — LIDOCAINE HYDROCHLORIDE AND EPINEPHRINE 15; 5 MG/ML; UG/ML
INJECTION, SOLUTION EPIDURAL
Status: DISCONTINUED | OUTPATIENT
Start: 2023-04-28 | End: 2023-04-29

## 2023-04-28 RX ORDER — SODIUM CITRATE AND CITRIC ACID MONOHYDRATE 334; 500 MG/5ML; MG/5ML
30 SOLUTION ORAL ONCE
Status: COMPLETED | OUTPATIENT
Start: 2023-04-28 | End: 2023-04-29

## 2023-04-28 RX ORDER — FAMOTIDINE 10 MG/ML
20 INJECTION INTRAVENOUS ONCE
Status: COMPLETED | OUTPATIENT
Start: 2023-04-28 | End: 2023-04-29

## 2023-04-28 RX ORDER — FENTANYL/BUPIVACAINE/NS/PF 2MCG/ML-.1
PLASTIC BAG, INJECTION (ML) INJECTION CONTINUOUS PRN
Status: DISCONTINUED | OUTPATIENT
Start: 2023-04-28 | End: 2023-04-29

## 2023-04-28 RX ORDER — ESCITALOPRAM OXALATE 10 MG/1
20 TABLET ORAL NIGHTLY
Status: DISCONTINUED | OUTPATIENT
Start: 2023-04-28 | End: 2023-05-02 | Stop reason: HOSPADM

## 2023-04-28 RX ORDER — FENTANYL/BUPIVACAINE/NS/PF 2MCG/ML-.1
PLASTIC BAG, INJECTION (ML) INJECTION
Status: COMPLETED
Start: 2023-04-28 | End: 2023-04-28

## 2023-04-28 RX ORDER — OXYTOCIN/RINGER'S LACTATE 30/500 ML
0-30 PLASTIC BAG, INJECTION (ML) INTRAVENOUS CONTINUOUS
Status: DISCONTINUED | OUTPATIENT
Start: 2023-04-28 | End: 2023-04-29

## 2023-04-28 RX ORDER — FENTANYL/BUPIVACAINE/NS/PF 2MCG/ML-.1
PLASTIC BAG, INJECTION (ML) INJECTION CONTINUOUS
Status: DISCONTINUED | OUTPATIENT
Start: 2023-04-28 | End: 2023-04-29

## 2023-04-28 RX ADMIN — MISOPROSTOL 50 MCG: 100 TABLET ORAL at 01:04

## 2023-04-28 RX ADMIN — LIDOCAINE HYDROCHLORIDE,EPINEPHRINE BITARTRATE 3 ML: 15; .005 INJECTION, SOLUTION EPIDURAL; INFILTRATION; INTRACAUDAL; PERINEURAL at 05:04

## 2023-04-28 RX ADMIN — ESCITALOPRAM OXALATE 20 MG: 10 TABLET ORAL at 08:04

## 2023-04-28 RX ADMIN — ONDANSETRON 8 MG: 8 TABLET, ORALLY DISINTEGRATING ORAL at 05:04

## 2023-04-28 RX ADMIN — SODIUM CHLORIDE, POTASSIUM CHLORIDE, SODIUM LACTATE AND CALCIUM CHLORIDE: 600; 310; 30; 20 INJECTION, SOLUTION INTRAVENOUS at 09:04

## 2023-04-28 RX ADMIN — SODIUM CHLORIDE, POTASSIUM CHLORIDE, SODIUM LACTATE AND CALCIUM CHLORIDE: 600; 310; 30; 20 INJECTION, SOLUTION INTRAVENOUS at 04:04

## 2023-04-28 RX ADMIN — CALCIUM CARBONATE (ANTACID) CHEW TAB 500 MG 500 MG: 500 CHEW TAB at 11:04

## 2023-04-28 RX ADMIN — Medication 8 ML/HR: at 05:04

## 2023-04-28 NOTE — PROGRESS NOTES
LABOR NOTE    S:  Complaints: No.  Epidural working:   yes    MD to bedside for cervical check     O: /72   Pulse 84   Temp 97.8 °F (36.6 °C)   Resp 18   Wt 88.3 kg (194 lb 10.7 oz)   LMP 08/01/2022   SpO2 99%   Breastfeeding No   BMI 38.02 kg/m²     FHT: 130s Cat 1 +accels, -decels, moderate variability (reassuring)  CTX: Q2- min    SVE: 3/60/-3, blanton out, pit @ 12    Timeline:   2130: cl/th/hi, cytotec given   0130: cl/70/-3, blanton attempted but not able to be placed after several attempts, will give additional dose of cytotec   0530: cl/70/-3, incredible difficulty with attempted blanton placement, unable to place   0845: 1/70/-3, cervical blanton placed, pit @ 8  1150: 3/70/-3, pit @ 16    PLAN:     Continue Close Maternal/Fetal Monitoring  Pitocin augmentation per protocol       Ami Beltran MD PGY-3  Obstetrics and Gynecology

## 2023-04-28 NOTE — PLAN OF CARE
Problem:  Fall Injury Risk  Goal: Absence of Fall, Infant Drop and Related Injury  Outcome: Ongoing, Progressing     Problem: Adult Inpatient Plan of Care  Goal: Plan of Care Review  Outcome: Ongoing, Progressing  Goal: Patient-Specific Goal (Individualized)  Outcome: Ongoing, Progressing  Goal: Absence of Hospital-Acquired Illness or Injury  Outcome: Ongoing, Progressing  Goal: Optimal Comfort and Wellbeing  Outcome: Ongoing, Progressing  Goal: Readiness for Transition of Care  Outcome: Ongoing, Progressing     Problem: Infection  Goal: Absence of Infection Signs and Symptoms  Outcome: Ongoing, Progressing     Problem: Diabetes in Pregnancy  Goal: Blood Glucose Level Within Targeted Range  Outcome: Ongoing, Progressing     Problem: Bleeding (Labor)  Goal: Hemostasis  Outcome: Ongoing, Progressing     Problem: Change in Fetal Wellbeing (Labor)  Goal: Stable Fetal Wellbeing  Outcome: Ongoing, Progressing     Problem: Delayed Labor Progression (Labor)  Goal: Effective Progression to Delivery  Outcome: Ongoing, Progressing     Problem: Infection (Labor)  Goal: Absence of Infection Signs and Symptoms  Outcome: Ongoing, Progressing     Problem: Labor Pain (Labor)  Goal: Acceptable Pain Control  Outcome: Ongoing, Progressing     Problem: Uterine Tachysystole (Labor)  Goal: Normal Uterine Contraction Pattern  Outcome: Ongoing, Progressing     Problem: Device-Related Complication Risk (Anesthesia/Analgesia, Neuraxial)  Goal: Safe Infusion Delivery Completion  Outcome: Ongoing, Progressing     Problem: Infection (Anesthesia/Analgesia, Neuraxial)  Goal: Absence of Infection Signs and Symptoms  Outcome: Ongoing, Progressing     Problem: Nausea and Vomiting (Anesthesia/Analgesia, Neuraxial)  Goal: Nausea and Vomiting Relief  Outcome: Ongoing, Progressing     Problem: Pain (Anesthesia/Analgesia, Neuraxial)  Goal: Effective Pain Control  Outcome: Ongoing, Progressing     Problem: Respiratory Compromise  (Anesthesia/Analgesia, Neuraxial)  Goal: Effective Oxygenation and Ventilation  Outcome: Ongoing, Progressing     Problem: Sensorimotor Impairment (Anesthesia/Analgesia, Neuraxial)  Goal: Baseline Motor Function  Outcome: Ongoing, Progressing     Problem: Urinary Retention (Anesthesia/Analgesia, Neuraxial)  Goal: Effective Urinary Elimination  Outcome: Ongoing, Progressing     Problem: Fall Injury Risk  Goal: Absence of Fall and Fall-Related Injury  Outcome: Ongoing, Progressing     Problem: Pain Acute  Goal: Acceptable Pain Control and Functional Ability  Outcome: Ongoing, Progressing

## 2023-04-28 NOTE — PROGRESS NOTES
LABOR NOTE    S:  Complaints: No.  Epidural working:  not applicable      MD to bedside for cervical check     O: BP (!) 137/90   Pulse 70   Temp 98.4 °F (36.9 °C) (Oral)   Resp 18   Wt 88.3 kg (194 lb 10.7 oz)   LMP 08/01/2022   SpO2 100%   Breastfeeding No   BMI 38.02 kg/m²     FHT: 130s Cat 1 +accels, -decels, moderate variability (reassuring)  CTX: irregular   SVE: cl/70/-3    Timeline:   2130: cl/th/hi, cytotec given   0130: cl/70/-3, blanton attempted but not able to be placed after several attempts, will give additional dose of cytotec     PLAN:    Continue Close Maternal/Fetal Monitoring  Cytotec ordered   Recheck 4 hours or PRN      Migdalia Bhatia MD   PGY-3, OB-GYN

## 2023-04-28 NOTE — ANESTHESIA PROCEDURE NOTES
Epidural    Patient location during procedure: OB   Reason for block: primary anesthetic   Reason for block: labor analgesia requested by patient and obstetrician  Diagnosis: IUP   Start time: 4/28/2023 5:57 AM  Timeout: 4/28/2023 5:57 AM  End time: 4/28/2023 5:57 AM    Staffing  Performing Provider: Cruz Finney MD  Authorizing Provider: Diana Arthur MD        Preanesthetic Checklist  Completed: patient identified, IV checked, site marked, risks and benefits discussed, surgical consent, monitors and equipment checked, pre-op evaluation, timeout performed, anesthesia consent given, hand hygiene performed and patient being monitored  Preparation  Patient position: sitting  Prep: ChloraPrep  Patient monitoring: Pulse Ox and Blood Pressure  Reason for block: primary anesthetic   Epidural  Skin Anesthetic: lidocaine 1%  Skin Wheal: 3 mL  Administration type: continuous  Approach: midline  Interspace: L3-4    Injection technique: RENETTA air  Needle and Epidural Catheter  Needle type: Tuohy   Needle gauge: 17  Needle length: 3.5 inches  Needle insertion depth: 7 cm  Catheter type: springwound  Catheter size: 19 G  Catheter at skin depth: 11 cm  Insertion Attempts: 1  Test dose: 3 mL of lidocaine 1.5% with Epi 1-to-200,000  Additional Documentation: incremental injection, negative aspiration for heme and CSF, no paresthesia on injection, no significant pain on injection, no signs/symptoms of IV or SA injection and no significant complaints from patient  Needle localization: anatomical landmarks  Assessment  Ease of block: easy  Patient's tolerance of the procedure: comfortable throughout block and no complaints No inadvertent dural puncture with Tuohy.  Dural puncture not performed with spinal needle

## 2023-04-28 NOTE — ED PROVIDER NOTES
Encounter Date: 2023       History     Chief Complaint   Patient presents with    Hypertension     Patient complains of right upper quadrant pain.    Headache     HPI     Can Wright is a 37 y.o.  female with IUP at 38w3d weeks gestation who presents with elevated BP in clinic.     Patient denies contractions, denies vaginal bleeding, denies LOF.   Fetal Movement: normal.     This IUP is complicated by gHTN, AMA, GDMA1, mood disorder, chiari malformation, obesity.  Review of patient's allergies indicates:   Allergen Reactions    Wellbutrin [bupropion hcl] Anxiety     Worsened anxiety    Amoxicillin trihydrate      Other reaction(s): severe stomach pain    Augmentin [amoxicillin-pot clavulanate] Nausea Only    Banana extract      Other reaction(s): swelling, itchy lips    Cat hair standardized allergenic extract      Other reaction(s): itchy eyes    Corn syrup      Other reaction(s): Unknown    Pollen extracts      Other reaction(s): sneezing, stuffy nose    Potassium clavulanate      Other reaction(s): severe stomach pain    Shellfish derived      Other reaction(s): Anaphylaxis    [employer]      Other reaction(s): itchy throat     Past Medical History:   Diagnosis Date    ADD (attention deficit disorder)     Allergy     Anxiety     , nortriptyline 50 mg nightly, Lexapro 20 q.d., propranolol 10 mg prn    Attention deficit hyperactivity disorder (ADHD), combined type 2014    Carpal tunnel syndrome on both sides     Neurontin 400 mg 3 times a day for carpal tunnel & arthritis. Bilateral injections Dr Talbert, Celebrex expensive    Chiari I malformation     Dysthymic disorder     lexapro    Episodic paroxysmal hemicrania, not intractable 2022    Hyperlipidemia     Shellfish allergy     epi pen    Syringomyelia     asymptomatic, released from Dr Lyon, steroids worked initially, tx Celebrex, Neurontin, Tyl #3 (helps prn), pamarpita, Dr Gracia recommended surgery     Past Surgical  History:   Procedure Laterality Date     SECTION N/A 2023    Procedure:  SECTION;  Surgeon: Sohan Graf MD;  Location: [facility] L&D;  Service: OB/GYN;  Laterality: N/A;    WISDOM TOOTH EXTRACTION       Family History   Problem Relation Age of Onset    Hyperlipidemia Mother     Fibromyalgia Mother     Arthritis Mother     Depression Father     Arthritis Maternal Grandmother     Heart disease Maternal Grandfather     Cancer Maternal Uncle         liver     Social History     Tobacco Use    Smoking status: Former     Packs/day: 0.50     Years: 7.00     Pack years: 3.50     Types: Cigarettes     Start date: 2016    Smokeless tobacco: Never   Substance Use Topics    Alcohol use: Yes     Alcohol/week: 1.0 standard drink     Types: 1 Cans of beer per week    Drug use: No     Review of Systems   Constitutional:  Negative for chills, fatigue and fever.   HENT:  Negative for congestion.    Eyes:  Negative for photophobia and visual disturbance.   Respiratory:  Negative for chest tightness and shortness of breath.    Cardiovascular:  Negative for chest pain.   Gastrointestinal:  Negative for abdominal pain, constipation, diarrhea, nausea and vomiting.   Neurological:  Negative for weakness and headaches.   Psychiatric/Behavioral:  Negative for behavioral problems, confusion, decreased concentration, dysphoric mood and hallucinations.      Physical Exam     Initial Vitals [23 1940]   BP Pulse Resp Temp SpO2   (!) 138/90 99 18 98.3 °F (36.8 °C) 99 %      MAP       --         Physical Exam    Vitals reviewed.  Constitutional: She appears well-developed and well-nourished. She is not diaphoretic. No distress.   HENT:   Head: Normocephalic and atraumatic.   Eyes: EOM are normal.   Neck:   Normal range of motion.  Cardiovascular:  Normal rate.           Pulmonary/Chest: No respiratory distress. She exhibits no tenderness.   Abdominal: Abdomen is soft. There is no abdominal tenderness. There is no rebound  and no guarding.   Musculoskeletal:         General: No edema. Normal range of motion.      Cervical back: Normal range of motion.     Neurological: She is alert and oriented to person, place, and time.   Skin: Skin is warm and dry.   Psychiatric: She has a normal mood and affect. Thought content normal.     OB LABOR EXAM:   Pre-Term Labor: No.   Membranes ruptured: No.   Method: Sterile vaginal exam per MD.   Vaginal Bleeding: none present.     Dilatation: 0.   Station: -5.   Effacement: 40%.   Amniotic Fluid Color: no fluid.   Amniotic Fluid Amount: absent.       ED Course   Fetal non-stress test    Date/Time: 4/27/2023 9:30 PM  Performed by: Paula Will MD  Authorized by: Julie R. Jeansonne, MD     Nonstress Test:     Variability:  6-25 BPM    Decelerations:  None    Accelerations:  15 bpm    Baseline:  130    Contractions:  Irregular  Biophysical Profile:     Nonstress Test Interpretation: reactive      Overall Impression:  Reassuring  Labs Reviewed   CBC W/ AUTO DIFFERENTIAL - Abnormal; Notable for the following components:       Result Value    WBC 14.13 (*)     MCH 26.9 (*)     RDW 15.1 (*)     Immature Granulocytes 0.6 (*)     Gran # (ANC) 9.8 (*)     Immature Grans (Abs) 0.08 (*)     All other components within normal limits   PROTEIN / CREATININE RATIO, URINE - Abnormal; Notable for the following components:    Protein, Urine Random 19 (*)     All other components within normal limits    Narrative:     Specimen Source->Urine   POCT URINALYSIS, DIPSTICK OR TABLET REAGENT, AUTOMATED, WITH MICROSCOP          Imaging Results    None          Medications   lactated ringers bolus 1,000 mL (has no administration in time range)   EScitalopram oxalate tablet 20 mg (20 mg Oral Given 5/1/23 4072)   fentanyl 2mcg/mL with BUPivacaine 0.1% in sdoium chloride 0.9% Epidural 2 mcg/mL- 0.1 % Soln (has no administration in time range)   naloxone 0.4 mg/mL injection 0.04 mg (has no administration in time range)    oxyCODONE immediate release tablet 5 mg (5 mg Oral Given 4/29/23 2018)   oxyCODONE immediate release tablet 10 mg (10 mg Oral Given 4/30/23 0955)   enoxaparin injection 40 mg (40 mg Subcutaneous Given 5/1/23 1812)   oxytocin 30 units in 500 mL lactated ringers infusion (non-titrating) (has no administration in time range)   chlorhexidine 0.12 % solution 10 mL (has no administration in time range)   lanolin cream (has no administration in time range)   senna-docusate 8.6-50 mg per tablet 1 tablet (has no administration in time range)   docusate sodium capsule 200 mg (200 mg Oral Given 5/2/23 0832)   simethicone chewable tablet 80 mg (80 mg Oral Given 5/1/23 1926)   diphenhydrAMINE capsule 25 mg (has no administration in time range)   prenatal vitamin oral tablet (1 tablet Oral Given 5/2/23 0832)   magnesium hydroxide 400 mg/5 ml suspension 2,400 mg (has no administration in time range)   bisacodyL suppository 10 mg (has no administration in time range)   measles, mumps and rubella vaccine 1,000-12,500 TCID50/0.5 mL injection 0.5 mL (has no administration in time range)   Tdap (BOOSTRIX) vaccine injection 0.5 mL (has no administration in time range)   lactated ringers infusion ( Intravenous Stopped 4/30/23 1728)   hydrOXYzine HCL tablet 25 mg (25 mg Oral Given 4/30/23 0050)   ibuprofen tablet 600 mg (600 mg Oral Given 5/2/23 0544)   oxyCODONE immediate release tablet 5 mg (5 mg Oral Given 5/1/23 2337)   ferrous sulfate tablet 1 each (1 each Oral Given 5/2/23 0833)   oxyCODONE immediate release tablet 10 mg (10 mg Oral Given 5/2/23 0840)   acetaminophen tablet 650 mg (650 mg Oral Given 5/2/23 0833)   oxytocin 30 units in 500 mL lactated ringers infusion (non-titrating) (0 osmani-units/min Intravenous Stopped 4/29/23 1936)   clindamycin in D5W 900 mg/50 mL IVPB 900 mg (900 mg Intravenous Given 4/29/23 1423)     And   azithromycin (ZITHROMAX) 500 mg in dextrose 5 % (D5W) 250 mL IVPB (Vial-Mate) (500 mg Intravenous New  Bag 4/29/23 1355)   misoprostol split tablet 50 mcg (50 mcg Oral Given 4/27/23 2126)   misoprostol split tablet 50 mcg (50 mcg Oral Given by Other 4/28/23 0136)   fentanyl 2mcg/mL with BUPivacaine 0.1% in sdoium chloride 0.9% Epidural 2 mcg/mL- 0.1 % Soln (  Override pull for Anesthesia 4/28/23 0557)   sodium citrate-citric acid 500-334 mg/5 ml solution 30 mL (30 mLs Oral Given 4/29/23 1340)   famotidine (PF) injection 20 mg (20 mg Intravenous Given 4/29/23 1340)   fentanyl 2mcg/mL with BUPivacaine 0.1% in sdoium chloride 0.9% Epidural 2 mcg/mL- 0.1 % Soln (  Override pull for Anesthesia 4/29/23 1055)   acetaminophen tablet 1,000 mg (1,000 mg Oral Given 4/29/23 1314)   ampicillin (OMNIPEN) 2 g in sodium chloride 0.9 % 100 mL IVPB (MB+) (0 g Intravenous Stopped 4/30/23 1725)   gentamicin (GARAMYCIN) 320 mg in sodium chloride 0.9% 100 mL IVPB (0 mg Intravenous Stopped 4/30/23 1416)   acetaminophen tablet 650 mg (650 mg Oral Given 4/30/23 1729)   ketorolac injection 30 mg (30 mg Intravenous Given 4/30/23 1729)   clindamycin in D5W 900 mg/50 mL IVPB 900 mg (0 mg Intravenous Stopped 4/30/23 1520)     Medical Decision Making:   ED Management:  Elevated BP   - Currently asymptomatic   - Patient has had two clinically elevated blood pressures   - She now meets criteria for gHTN and will be admitted for induction of labor  - CBC & CMP wnl, PC ratio pending  - Admit orders sent  - Patient scanned with U/S and fetal presentation is vertex   - Risks, benefits, alternatives and possible complications have been discussed in detail with the patient  - Consents signed and to chart  - Transfer from ED to Labor and Delivery unit                Attending Attestation:   Physician Attestation Statement for Resident:  As the supervising MD   Physician Attestation Statement: I have personally seen and examined this patient.   I agree with the above history.  -:   As the supervising MD I agree with the above PE.     As the supervising MD I  agree with the above treatment, course, plan, and disposition.   -: NST reactive and reassuring, toco with no contractions.  Pre-E labs  drawn.  Patient with mild range Bps in [place].  No sxs of severe disease  Will transfer to L&D for induction for GHTN  Anticipate .  I was personally present during the critical portions of the procedure(s) performed by the resident and was immediately available in the ED to provide services and assistance as needed during the entire procedure.  I have reviewed and agree with the residents interpretation of the following: lab data.  I have reviewed the following: old records at this facility.                           Clinical Impression:   Final diagnoses:  [O13.3] Gestational hypertension, third trimester (Primary)  [Z3A.38] 38 weeks gestation of pregnancy        ED Disposition Condition    Send to L&D Stable                Paula Will MD  Resident  [phone]       Simi Pena MD  [phone]

## 2023-04-28 NOTE — ANESTHESIA PREPROCEDURE EVALUATION
2023  [facility]  Anesthesia Pre-Operative Evaluation         Patient Name: Can Wright  YOB: 1985  MRN: [id number]    2023    Can Wright is a 37 y.o. female  @ 38w3d who presents for IOL 2/2 gHTN.  IUP c/b gHTN, obesity, gDM, AMA and arnold chiari malformation.  Patient denies any bleeding dz or scoliosis.     From Dr. Parsons when asked about epidural/spinal/GETA:  There is not a lot of data on the topic but vaginal delivery in the setting of chiari is safe.  Epidural blockade is helpful to limit valsalva efforts, so should be fine (especially if there is no intradural breech).     That said, if the patient should become symptomatic then I would recommend they do .     OB History    Para Term  AB Living   2       1     SAB IAB Ectopic Multiple Live Births   1              # Outcome Date GA Lbr [place]/2nd Weight Sex Delivery Anes PTL Lv   2 Current            1 SAB               Obstetric Comments   2020       Review of patient's allergies indicates:   Allergen Reactions    Wellbutrin [bupropion hcl] Anxiety     Worsened anxiety    Amoxicillin trihydrate      Other reaction(s): severe stomach pain    Augmentin [amoxicillin-pot clavulanate] Nausea Only    Banana extract      Other reaction(s): swelling, itchy lips    Cat hair standardized allergenic extract      Other reaction(s): itchy eyes    Corn syrup      Other reaction(s): Unknown    Pollen extracts      Other reaction(s): sneezing, stuffy nose    Potassium clavulanate      Other reaction(s): severe stomach pain    Rice      Other reaction(s): Unknown    Shellfish derived      Other reaction(s): Anaphylaxis    Strawberry      Other reaction(s): itchy throat       Wt Readings from Last 1 Encounters:   23 88.3 kg (194 lb 10.7 oz)       BP  Readings from Last 3 Encounters:   04/27/23 (!) 156/86   04/25/23 130/82   04/25/23 (!) 140/94       Patient Active Problem List   Diagnosis    Chiari malformation type I    Syringomyelia    Attention deficit hyperactivity disorder (ADHD), combined type    Dysthymic disorder    Anxiety    Episodic paroxysmal hemicrania, not intractable    Shellfish allergy    Medication exposure during first trimester of pregnancy    AMA (advanced maternal age) multigravida 35+, second trimester    Mixed anxiety depressive disorder    Mental disorder affecting pregnancy in third trimester    Diet controlled gestational diabetes mellitus (GDM) in third trimester    Class 2 obesity due to excess calories without serious comorbidity with body mass index (BMI) of 37.0 to 37.9 in adult    Obesity affecting pregnancy in third trimester       Past Surgical History:   Procedure Laterality Date    WISDOM TOOTH EXTRACTION         Social History     Socioeconomic History    Marital status: Single   Tobacco Use    Smoking status: Former     Packs/day: 0.50     Years: 7.00     Pack years: 3.50     Types: Cigarettes     Start date: 6/2/2016    Smokeless tobacco: Never   Substance and Sexual Activity    Alcohol use: Yes     Alcohol/week: 1.0 standard drink     Types: 1 Cans of beer per week    Drug use: No    Sexual activity: Yes     Birth control/protection: None         Chemistry        Component Value Date/Time     04/27/2023 2009    K 3.4 (L) 04/27/2023 2009     04/27/2023 2009    CO2 17 (L) 04/27/2023 2009    BUN 11 04/27/2023 2009    CREATININE 0.8 04/27/2023 2009     (H) 04/27/2023 2009        Component Value Date/Time    CALCIUM 9.4 04/27/2023 2009    ALKPHOS 135 04/27/2023 2009    AST 15 04/27/2023 2009    ALT 14 04/27/2023 2009    BILITOT 0.2 04/27/2023 2009    ESTGFRAFRICA >60.0 12/13/2021 0744    EGFRNONAA >60.0 12/13/2021 0744            Lab Results   Component Value Date    WBC 14.13 (H)  04/27/2023    HGB 12.2 04/27/2023    HCT 37.7 04/27/2023    MCV 83 04/27/2023     04/27/2023       No results for input(s): PT, INR, PROTIME, APTT in the last 72 hours.            Pre-op Assessment    I have reviewed the Patient Summary Reports.     I have reviewed the Nursing Notes. I have reviewed the NPO Status.   I have reviewed the Medications.     Review of Systems  Anesthesia Hx:  No problems with previous Anesthesia  Denies Family Hx of Anesthesia complications.   Denies Personal Hx of Anesthesia complications.   Hematology/Oncology:  Hematology Normal   Oncology Normal     EENT/Dental:EENT/Dental Normal   Cardiovascular:   Hypertension    Pulmonary:  Pulmonary Normal    Renal/:  Renal/ Normal     Hepatic/GI:  Hepatic/GI Normal    Neurological:   Neuromuscular Disease,    Endocrine:   Diabetes    Psych:   Psychiatric History          Physical Exam  General: Well nourished, Cooperative, Alert and Oriented    Airway:  Mallampati: III   Mouth Opening: Normal  TM Distance: Normal  Tongue: Normal    Dental:  Intact        Anesthesia Plan  Type of Anesthesia, risks & benefits discussed:    Anesthesia Type: Epidural  Intra-op Monitoring Plan: Standard ASA Monitors  Post Op Pain Control Plan: multimodal analgesia and epidural analgesia  Informed Consent: Informed consent signed with the Patient and all parties understand the risks and agree with anesthesia plan.  All questions answered.   ASA Score: 3  Day of Surgery Review of History & Physical: H&P Update referred to the surgeon/provider.    Ready For Surgery From Anesthesia Perspective.     .

## 2023-04-28 NOTE — H&P
HISTORY AND PHYSICAL                                                OBSTETRICS          Subjective:       Can Wright is a 37 y.o.  female with IUP at 38w3d weeks gestation who presents with gestational HTN admitted for induction of labor.    Patient denies contractions, denies vaginal bleeding, denies LOF.   Fetal Movement: normal.    This IUP is complicated by gHTN, AMA, GDMA1, mood disorder, chiari malformation, obesity.    Review of Systems   Constitutional:  Negative for chills and fever.   Respiratory:  Negative for shortness of breath.    Cardiovascular:  Negative for chest pain.   Gastrointestinal:  Negative for abdominal pain, nausea and vomiting.   Endocrine: Negative for hot flashes.   Genitourinary:  Negative for menstrual problem.   Integumentary:  Negative for breast mass, nipple discharge and breast skin changes.   Neurological:  Negative for headaches.   Hematological:  Does not bruise/bleed easily.   Psychiatric/Behavioral:  Negative for depression.    Breast: Negative for mass, mastodynia, nipple discharge and skin changes    PMHx:   Past Medical History:   Diagnosis Date    ADD (attention deficit disorder)     Allergy     Anxiety     , nortriptyline 50 mg nightly, Lexapro 20 q.d., propranolol 10 mg prn    Attention deficit hyperactivity disorder (ADHD), combined type 2014    Carpal tunnel syndrome on both sides     Neurontin 400 mg 3 times a day for carpal tunnel & arthritis. Bilateral injections Dr Talbert, Celebrex expensive    Chiari I malformation     Dysthymic disorder     lexapro    Episodic paroxysmal hemicrania, not intractable 2022    Hyperlipidemia     Shellfish allergy     epi pen    Syringomyelia     asymptomatic, released from Dr Lyon, steroids worked initially, tx Celebrex, Neurontin, Tyl #3 (helps prn), Dr Samia braswell recommended surgery       PSHx:   Past Surgical History:   Procedure Laterality Date    WISDOM TOOTH EXTRACTION         All:    Review of patient's allergies indicates:   Allergen Reactions    Wellbutrin [bupropion hcl] Anxiety     Worsened anxiety    Amoxicillin trihydrate      Other reaction(s): severe stomach pain    Augmentin [amoxicillin-pot clavulanate] Nausea Only    Banana extract      Other reaction(s): swelling, itchy lips    Cat hair standardized allergenic extract      Other reaction(s): itchy eyes    Corn syrup      Other reaction(s): Unknown    Pollen extracts      Other reaction(s): sneezing, stuffy nose    Potassium clavulanate      Other reaction(s): severe stomach pain    Rice      Other reaction(s): Unknown    Shellfish derived      Other reaction(s): Anaphylaxis    Nova      Other reaction(s): itchy throat       Meds:   Medications Prior to Admission   Medication Sig Dispense Refill Last Dose    blood sugar diagnostic Strp 1 each by Misc.(Non-Drug; Combo Route) route 4 (four) times daily. 200 each 1     blood-glucose meter Misc Use daily as instructed 1 each 0     EPINEPHrine (EPIPEN) 0.3 mg/0.3 mL AtIn Inject 0.3 mLs (0.3 mg total) into the muscle once. for 1 dose (Patient not taking: Reported on 4/14/2023) 0.3 mL 12     EScitalopram oxalate (LEXAPRO) 20 MG tablet TAKE 1 TABLET BY MOUTH EVERY DAY 90 tablet 1     ferrous sulfate 325 (65 FE) MG EC tablet Take 1 tablet (325 mg total) by mouth once daily. 90 tablet 1     fluticasone propionate (FLONASE) 50 mcg/actuation nasal spray INSTILL ONE SPRAY INTO EACH NOSTRIL ONCE DAILY 48 mL 3     folic acid (FOLVITE) 1 MG tablet TAKE 4 TABLETS (4 MG TOTAL) BY MOUTH ONCE DAILY. 100 tablet 3     gabapentin (NEURONTIN) 400 MG capsule Take 1 capsule (400 mg total) by mouth 3 (three) times daily. 90 capsule 3     NAHTAN FE 1/20, 28, 1 mg-20 mcg (21)/75 mg (7) per tablet        TRUEPLUS LANCETS 30 gauge Misc USE 1 LANCET 4 TIMES A DAY          SH:   Social History     Socioeconomic History    Marital status: Single   Tobacco Use    Smoking status: Former     Packs/day: 0.50      Years: 7.00     Pack years: 3.50     Types: Cigarettes     Start date: 2016    Smokeless tobacco: Never   Substance and Sexual Activity    Alcohol use: Yes     Alcohol/week: 1.0 standard drink     Types: 1 Cans of beer per week    Drug use: No    Sexual activity: Yes     Birth control/protection: None       FH:   Family History   Problem Relation Age of Onset    Hyperlipidemia Mother     Fibromyalgia Mother     Arthritis Mother     Depression Father     Arthritis Maternal Grandmother     Heart disease Maternal Grandfather     Cancer Maternal Uncle         liver       OBHx:   OB History    Para Term  AB Living   2 0 0 0 1 0   SAB IAB Ectopic Multiple Live Births   1 0 0 0 0      # Outcome Date GA Lbr [place]/2nd Weight Sex Delivery Anes PTL Lv   2 Current            1 SAB               Obstetric Comments   2020       Objective:       BP (!) 156/86   Pulse 97   Temp 98.3 °F (36.8 °C) (Oral)   Resp 18   Wt 88.3 kg (194 lb 10.7 oz)   LMP 2022   SpO2 98%   Breastfeeding No   BMI 38.02 kg/m²     Vitals:    23   BP:  (!) 156/86 (!) 156/86    Pulse: 87 82 97 97   Resp:       Temp:       TempSrc:       SpO2: 97% 98% 98%    Weight:   88.3 kg (194 lb 10.7 oz)        General:   alert, appears stated age and cooperative, no apparent distress   HENT:  normocephalic, atraumatic   Eyes:  extraocular movements and conjunctivae normal   Neck:  supple, range of motion normal, no thyromegaly   Lungs:   no respiratory distress   Heart:   regular rate   Abdomen:  soft, non-tender, non-distended but gravid, no rebound or guarding    Extremities negative edema, negative erythema   FHT: 130, moderate BTBV, +accels, -decels;  Cat 1 (reassuring)                 TOCO: irregular   Presentations: cephalic by ultrasound   Cervix:     Dilation: Closed    Effacement: 50%    Station:  -3    Consistency: firm    Position: posterior     EFW by Leopold's: 7.5#    Recent  Growth Scan: 1868 g @32w1d    Lab Review  Blood Type A POS  GBBS: negative  Rubella: Immune  RPR: negative  HIV: negative  HepB: negative       Assessment:       38w3d weeks gestation with GHTN admitted for IOL    Active Hospital Problems    Diagnosis  POA    Mental disorder affecting pregnancy in third trimester [O99.343]  Yes    Diet controlled gestational diabetes mellitus (GDM) in third trimester [O24.410]  Yes    Class 2 obesity due to excess calories without serious comorbidity with body mass index (BMI) of 37.0 to 37.9 in adult [E66.09, Z68.37]  Not Applicable    Obesity affecting pregnancy in third trimester [O99.213]  Yes    AMA (advanced maternal age) multigravida 35+, second trimester [O09.522]  Yes    Medication exposure during first trimester of pregnancy [O09.891]  Not Applicable    Syringomyelia [G95.0]  Yes    Chiari malformation type I [G93.5]  Yes      Resolved Hospital Problems   No resolved problems to display.          Plan:   IOL   Risks, benefits, alternatives and possible complications have been discussed in detail with the patient.   - Consents signed and to chart  - Admit to Labor and Delivery unit  - Epidural per Anesthesia  - Draw CBC, T&S  - Notify Staff  - Recheck in 4 hrs or PRN  - EFW 1868 g @32w1d  - Maternal pelvis unproven  -Will start with blanton balloon and cyotec for induction agent    Post-Partum Hemorrhage risk - low    gHTN  - BP as above  - asymptomatic  - preE labs pending  - Mag: not indicated  - Hypertensive agent none    Obesity  -BMI 38  -pre preg   -SCDs in bed, encourage ambulation    AMA  -mat 21 neg, afp neg  -normal anatomy scan    Chiari malformation type 1  -consulted by neurosurgery  -approved for     GDMA1  -q4h in latent labor  -q2h in active labor     Mood disorder  -stable   -continue home meds: lexapro    Contraception  -undecided    Paula Will MD PGY-1  Obstetrics and Gynecology

## 2023-04-28 NOTE — PROGRESS NOTES
LABOR NOTE    S:  Complaints: No.  Epidural working:  not applicable      MD to bedside for cervical check     O: BP (!) 102/55   Pulse 70   Temp 98.2 °F (36.8 °C) (Oral)   Resp 18   Wt 88.3 kg (194 lb 10.7 oz)   LMP 08/01/2022   SpO2 98%   Breastfeeding No   BMI 38.02 kg/m²     FHT: 120s Cat 1 +accels, -decels, moderate variability (reassuring)  CTX: Q2-3 min    SVE: cl/70/-3    Timeline:   2130: cl/th/hi, cytotec given   0130: cl/70/-3, blanton attempted but not able to be placed after several attempts, will give additional dose of cytotec   0530: cl/70/-3, incredible difficulty with attempted blanton placement, unable to place     PLAN:  Patient amenable to epidural now and attempted placement of blanton after comfortable with epidural. Now elvi too frequently for additional cytotec, will transition to pitocin.   Continue Close Maternal/Fetal Monitoring  Pitocin augmentation per protocol   Recheck  after epidural for blanton placement       Migdalia Bhatia MD   PGY-3, OB-GYN

## 2023-04-28 NOTE — ED PROVIDER NOTES
Encounter Date: 2023       History     Chief Complaint   Patient presents with    Hypertension     Patient complains of right upper quadrant pain.    Headache     Can Wright is a 37 y.o. [id number] at 38w3d presents complaining of ***.   This IUP is complicated by ***.  Patient {Denies / Reports:43013} contractions, {Denies / Reports:93051} vaginal bleeding, {Denies / Reports:04892} LOF.   Fetal Movement: {fetal movement:44789}   Pre-eclampsia: visual deficits, CP, SOB, RUQ pain, HA    Review of patient's allergies indicates:   Allergen Reactions    Wellbutrin [bupropion hcl] Anxiety     Worsened anxiety    Amoxicillin trihydrate      Other reaction(s): severe stomach pain    Augmentin [amoxicillin-pot clavulanate] Nausea Only    Banana extract      Other reaction(s): swelling, itchy lips    Cat hair standardized allergenic extract      Other reaction(s): itchy eyes    Corn syrup      Other reaction(s): Unknown    Pollen extracts      Other reaction(s): sneezing, stuffy nose    Potassium clavulanate      Other reaction(s): severe stomach pain    Rice      Other reaction(s): Unknown    Shellfish derived      Other reaction(s): Anaphylaxis    [place]play      Other reaction(s): itchy throat     Past Medical History:   Diagnosis Date    ADD (attention deficit disorder)     Allergy     Anxiety     , nortriptyline 50 mg nightly, Lexapro 20 q.d., propranolol 10 mg prn    Attention deficit hyperactivity disorder (ADHD), combined type 2014    Carpal tunnel syndrome on both sides     Neurontin 400 mg 3 times a day for carpal tunnel & arthritis. Bilateral injections Dr Talbert, Celebrex expensive    Chiari I malformation     Dysthymic disorder     lexapro    Episodic paroxysmal hemicrania, not intractable 2022    Hyperlipidemia     Shellfish allergy     epi pen    Syringomyelia     asymptomatic, released from Dr Lyon, steroids worked initially, tx Celebrex, Neurontin, Tyl #3 (helps prn),  "Dr Samia braswell recommended surgery     Past Surgical History:   Procedure Laterality Date    WISDOM TOOTH EXTRACTION       Family History   Problem Relation Age of Onset    Hyperlipidemia Mother     Fibromyalgia Mother     Arthritis Mother     Depression Father     Arthritis Maternal Grandmother     Heart disease Maternal Grandfather     Cancer Maternal Uncle         liver     Social History     Tobacco Use    Smoking status: Former     Packs/day: 0.50     Years: 7.00     Pack years: 3.50     Types: Cigarettes     Start date: 6/2/2016    Smokeless tobacco: Never   Substance Use Topics    Alcohol use: Yes     Alcohol/week: 1.0 standard drink     Types: 1 Cans of beer per week    Drug use: No     Review of Systems    Physical Exam     Initial Vitals   BP Pulse Resp Temp SpO2   -- -- -- -- --      MAP       --         Physical Exam    ED Course   Procedures  Labs Reviewed   COMPREHENSIVE METABOLIC PANEL   CBC W/ AUTO DIFFERENTIAL   PROTEIN / CREATININE RATIO, URINE   POCT URINALYSIS, DIPSTICK OR TABLET REAGENT, AUTOMATED, WITH MICROSCOP          Imaging Results    None          Medications - No data to display                           Clinical Impression:    ***Please document a Clinical Impression and click the "Refresh" button to refresh your note and automatically pull in before signing.***         "

## 2023-04-28 NOTE — PROGRESS NOTES
LABOR NOTE    S:  Complaints: No.  Epidural working:   yes    MD to bedside for cervical check     O: /79   Pulse 69   Temp 98 °F (36.7 °C)   Resp 18   Wt 88.3 kg (194 lb 10.7 oz)   LMP 08/01/2022   SpO2 98%   Breastfeeding No   BMI 38.02 kg/m²     FHT: 140s Cat 1 +accels, -decels, moderate variability (reassuring)  CTX: Q2- min    SVE: 3/60/-3,  pit @ 20    Timeline:   2130: cl/th/hi, cytotec given   0130: cl/70/-3, blanton attempted but not able to be placed after several attempts, will give additional dose of cytotec   0530: cl/70/-3, incredible difficulty with attempted blanton placement, unable to place   0845: 1/70/-3, cervical blanton placed, pit @ 8  1150: 3/70/-3, pit @ 16  1700: 3/70/-3, pit @ 20, unable to rupture    PLAN:     Continue Close Maternal/Fetal Monitoring  Pitocin augmentation per protocol       Ami Beltran MD PGY-3  Obstetrics and Gynecology

## 2023-04-28 NOTE — PROGRESS NOTES
LABOR NOTE    S:  Complaints: No.  Epidural working:   yes    MD to bedside for cervical check     O: /65   Pulse 68   Temp 97.8 °F (36.6 °C)   Resp 18   Wt 88.3 kg (194 lb 10.7 oz)   LMP 08/01/2022   SpO2 100%   Breastfeeding No   BMI 38.02 kg/m²     FHT: 125s Cat 1 +accels, -decels, moderate variability (reassuring)  CTX: Q2- min    SVE: 1/70/-3, cervical blanton placed    Timeline:   2130: cl/th/hi, cytotec given   0130: cl/70/-3, blanton attempted but not able to be placed after several attempts, will give additional dose of cytotec   0530: cl/70/-3, incredible difficulty with attempted blanton placement, unable to place   0845: 1/70/-3, cervical blanton placed, pit @ 8    PLAN:     Continue Close Maternal/Fetal Monitoring  Pitocin augmentation per protocol       Ami Beltran MD PGY-3  Obstetrics and Gynecology

## 2023-04-29 PROBLEM — O13.3 GESTATIONAL HYPERTENSION, THIRD TRIMESTER: Status: ACTIVE | Noted: 2023-04-29

## 2023-04-29 PROBLEM — O09.891 MEDICATION EXPOSURE DURING FIRST TRIMESTER OF PREGNANCY: Status: RESOLVED | Noted: 2022-12-19 | Resolved: 2023-04-29

## 2023-04-29 LAB
ALLENS TEST: ABNORMAL
ALLENS TEST: ABNORMAL
HCO3 UR-SCNC: 23.5 MMOL/L (ref 24–28)
HCO3 UR-SCNC: 24.6 MMOL/L (ref 24–28)
PCO2 BLDA: 50.5 MMHG (ref 35–45)
PCO2 BLDA: 61 MMHG (ref 35–45)
PH SMN: 7.21 [PH] (ref 7.35–7.45)
PH SMN: 7.28 [PH] (ref 7.35–7.45)
PO2 BLDA: 18 MMHG (ref 80–100)
PO2 BLDA: 9 MMHG (ref 80–100)
POC BE: -3 MMOL/L
POC BE: -3 MMOL/L
POC SATURATED O2: 22 % (ref 95–100)
POC SATURATED O2: 6 % (ref 95–100)
POC TCO2: 25 MMOL/L (ref 23–27)
POC TCO2: 26 MMOL/L (ref 23–27)
POCT GLUCOSE: 59 MG/DL (ref 70–110)
POCT GLUCOSE: 62 MG/DL (ref 70–110)
POCT GLUCOSE: 65 MG/DL (ref 70–110)
POCT GLUCOSE: 66 MG/DL (ref 70–110)
POCT GLUCOSE: 67 MG/DL (ref 70–110)
POCT GLUCOSE: 93 MG/DL (ref 70–110)
SAMPLE: ABNORMAL
SAMPLE: ABNORMAL
SITE: ABNORMAL
SITE: ABNORMAL

## 2023-04-29 PROCEDURE — 11000001 HC ACUTE MED/SURG PRIVATE ROOM

## 2023-04-29 PROCEDURE — 25000003 PHARM REV CODE 250: Performed by: STUDENT IN AN ORGANIZED HEALTH CARE EDUCATION/TRAINING PROGRAM

## 2023-04-29 PROCEDURE — 63600175 PHARM REV CODE 636 W HCPCS: Performed by: OBSTETRICS & GYNECOLOGY

## 2023-04-29 PROCEDURE — 25000003 PHARM REV CODE 250

## 2023-04-29 PROCEDURE — 36004725 HC OB OR TIME LEV III - EA ADD 15 MIN: Performed by: OBSTETRICS & GYNECOLOGY

## 2023-04-29 PROCEDURE — 37000009 HC ANESTHESIA EA ADD 15 MINS: Performed by: OBSTETRICS & GYNECOLOGY

## 2023-04-29 PROCEDURE — 99900035 HC TECH TIME PER 15 MIN (STAT)

## 2023-04-29 PROCEDURE — 59514 PR CESAREAN DELIVERY ONLY: ICD-10-PCS | Mod: AT,,, | Performed by: OBSTETRICS & GYNECOLOGY

## 2023-04-29 PROCEDURE — 71000039 HC RECOVERY, EACH ADD'L HOUR: Performed by: OBSTETRICS & GYNECOLOGY

## 2023-04-29 PROCEDURE — 36416 COLLJ CAPILLARY BLOOD SPEC: CPT

## 2023-04-29 PROCEDURE — 82803 BLOOD GASES ANY COMBINATION: CPT

## 2023-04-29 PROCEDURE — 63600175 PHARM REV CODE 636 W HCPCS: Performed by: STUDENT IN AN ORGANIZED HEALTH CARE EDUCATION/TRAINING PROGRAM

## 2023-04-29 PROCEDURE — 63600175 PHARM REV CODE 636 W HCPCS

## 2023-04-29 PROCEDURE — 59514 CESAREAN DELIVERY ONLY: CPT | Mod: AT,,, | Performed by: OBSTETRICS & GYNECOLOGY

## 2023-04-29 PROCEDURE — 51702 INSERT TEMP BLADDER CATH: CPT

## 2023-04-29 PROCEDURE — 37000008 HC ANESTHESIA 1ST 15 MINUTES: Performed by: OBSTETRICS & GYNECOLOGY

## 2023-04-29 PROCEDURE — 71000033 HC RECOVERY, INTIAL HOUR: Performed by: OBSTETRICS & GYNECOLOGY

## 2023-04-29 PROCEDURE — 27001000 HC PILLOW, VAGINAL FETAL DEVICE

## 2023-04-29 PROCEDURE — 36004724 HC OB OR TIME LEV III - 1ST 15 MIN: Performed by: OBSTETRICS & GYNECOLOGY

## 2023-04-29 RX ORDER — OXYCODONE HYDROCHLORIDE 5 MG/1
10 TABLET ORAL EVERY 4 HOURS PRN
Status: DISPENSED | OUTPATIENT
Start: 2023-04-29 | End: 2023-04-30

## 2023-04-29 RX ORDER — METHYLERGONOVINE MALEATE 0.2 MG/ML
200 INJECTION INTRAVENOUS
Status: DISCONTINUED | OUTPATIENT
Start: 2023-04-29 | End: 2023-04-29

## 2023-04-29 RX ORDER — ACETAMINOPHEN 325 MG/1
650 TABLET ORAL EVERY 6 HOURS
Status: COMPLETED | OUTPATIENT
Start: 2023-04-29 | End: 2023-04-30

## 2023-04-29 RX ORDER — ACETAMINOPHEN 500 MG
1000 TABLET ORAL ONCE
Status: DISCONTINUED | OUTPATIENT
Start: 2023-04-29 | End: 2023-04-29

## 2023-04-29 RX ORDER — ONDANSETRON HYDROCHLORIDE 2 MG/ML
INJECTION, SOLUTION INTRAMUSCULAR; INTRAVENOUS
Status: DISCONTINUED | OUTPATIENT
Start: 2023-04-29 | End: 2023-04-29

## 2023-04-29 RX ORDER — TRANEXAMIC ACID 10 MG/ML
1000 INJECTION, SOLUTION INTRAVENOUS ONCE AS NEEDED
Status: CANCELLED | OUTPATIENT
Start: 2023-04-29 | End: 2034-09-25

## 2023-04-29 RX ORDER — PRENATAL WITH FERROUS FUM AND FOLIC ACID 3080; 920; 120; 400; 22; 1.84; 3; 20; 10; 1; 12; 200; 27; 25; 2 [IU]/1; [IU]/1; MG/1; [IU]/1; MG/1; MG/1; MG/1; MG/1; MG/1; MG/1; UG/1; MG/1; MG/1; MG/1; MG/1
1 TABLET ORAL DAILY
Status: DISCONTINUED | OUTPATIENT
Start: 2023-04-30 | End: 2023-05-02 | Stop reason: HOSPADM

## 2023-04-29 RX ORDER — SIMETHICONE 80 MG
1 TABLET,CHEWABLE ORAL EVERY 6 HOURS PRN
Status: DISCONTINUED | OUTPATIENT
Start: 2023-04-29 | End: 2023-05-02 | Stop reason: HOSPADM

## 2023-04-29 RX ORDER — CLINDAMYCIN PHOSPHATE 900 MG/50ML
900 INJECTION, SOLUTION INTRAVENOUS
Status: COMPLETED | OUTPATIENT
Start: 2023-04-29 | End: 2023-04-30

## 2023-04-29 RX ORDER — OXYTOCIN/RINGER'S LACTATE 30/500 ML
95 PLASTIC BAG, INJECTION (ML) INTRAVENOUS ONCE AS NEEDED
Status: CANCELLED | OUTPATIENT
Start: 2023-04-29 | End: 2034-09-25

## 2023-04-29 RX ORDER — AMOXICILLIN 250 MG
1 CAPSULE ORAL NIGHTLY PRN
Status: DISCONTINUED | OUTPATIENT
Start: 2023-04-29 | End: 2023-05-02 | Stop reason: HOSPADM

## 2023-04-29 RX ORDER — KETOROLAC TROMETHAMINE 30 MG/ML
30 INJECTION, SOLUTION INTRAMUSCULAR; INTRAVENOUS EVERY 6 HOURS
Status: COMPLETED | OUTPATIENT
Start: 2023-04-29 | End: 2023-04-30

## 2023-04-29 RX ORDER — OXYTOCIN 10 [USP'U]/ML
INJECTION, SOLUTION INTRAMUSCULAR; INTRAVENOUS
Status: DISCONTINUED | OUTPATIENT
Start: 2023-04-29 | End: 2023-04-29

## 2023-04-29 RX ORDER — SODIUM CHLORIDE, SODIUM LACTATE, POTASSIUM CHLORIDE, CALCIUM CHLORIDE 600; 310; 30; 20 MG/100ML; MG/100ML; MG/100ML; MG/100ML
INJECTION, SOLUTION INTRAVENOUS CONTINUOUS
Status: DISCONTINUED | OUTPATIENT
Start: 2023-04-29 | End: 2023-05-02 | Stop reason: HOSPADM

## 2023-04-29 RX ORDER — BISACODYL 10 MG
10 SUPPOSITORY, RECTAL RECTAL ONCE AS NEEDED
Status: DISCONTINUED | OUTPATIENT
Start: 2023-04-29 | End: 2023-05-02 | Stop reason: HOSPADM

## 2023-04-29 RX ORDER — GENTAMICIN SULFATE 40 MG/ML
5 INJECTION, SOLUTION INTRAMUSCULAR; INTRAVENOUS
Status: DISCONTINUED | OUTPATIENT
Start: 2023-04-29 | End: 2023-04-29

## 2023-04-29 RX ORDER — NALBUPHINE HYDROCHLORIDE 10 MG/ML
5 INJECTION, SOLUTION INTRAMUSCULAR; INTRAVENOUS; SUBCUTANEOUS ONCE
Status: DISCONTINUED | OUTPATIENT
Start: 2023-04-29 | End: 2023-04-29

## 2023-04-29 RX ORDER — ENOXAPARIN SODIUM 100 MG/ML
40 INJECTION SUBCUTANEOUS EVERY 24 HOURS
Status: DISCONTINUED | OUTPATIENT
Start: 2023-04-30 | End: 2023-05-02 | Stop reason: HOSPADM

## 2023-04-29 RX ORDER — DOCUSATE SODIUM 100 MG/1
200 CAPSULE, LIQUID FILLED ORAL 2 TIMES DAILY
Status: DISCONTINUED | OUTPATIENT
Start: 2023-04-29 | End: 2023-05-02 | Stop reason: HOSPADM

## 2023-04-29 RX ORDER — FENTANYL CITRATE 50 UG/ML
INJECTION, SOLUTION INTRAMUSCULAR; INTRAVENOUS
Status: DISCONTINUED | OUTPATIENT
Start: 2023-04-29 | End: 2023-04-29

## 2023-04-29 RX ORDER — ONDANSETRON 8 MG/1
8 TABLET, ORALLY DISINTEGRATING ORAL EVERY 8 HOURS PRN
Status: CANCELLED | OUTPATIENT
Start: 2023-04-29

## 2023-04-29 RX ORDER — MISOPROSTOL 200 UG/1
800 TABLET ORAL ONCE AS NEEDED
Status: CANCELLED | OUTPATIENT
Start: 2023-04-29

## 2023-04-29 RX ORDER — CARBOPROST TROMETHAMINE 250 UG/ML
250 INJECTION, SOLUTION INTRAMUSCULAR
Status: CANCELLED | OUTPATIENT
Start: 2023-04-29

## 2023-04-29 RX ORDER — NALOXONE HCL 0.4 MG/ML
0.04 VIAL (ML) INJECTION
Status: DISCONTINUED | OUTPATIENT
Start: 2023-04-29 | End: 2023-05-02 | Stop reason: HOSPADM

## 2023-04-29 RX ORDER — OXYCODONE HYDROCHLORIDE 5 MG/1
5 TABLET ORAL EVERY 4 HOURS PRN
Status: DISPENSED | OUTPATIENT
Start: 2023-04-29 | End: 2023-04-30

## 2023-04-29 RX ORDER — MISOPROSTOL 200 UG/1
800 TABLET ORAL ONCE AS NEEDED
Status: CANCELLED | OUTPATIENT
Start: 2023-04-29 | End: 2034-09-25

## 2023-04-29 RX ORDER — SODIUM CHLORIDE 0.9 % (FLUSH) 0.9 %
10 SYRINGE (ML) INJECTION EVERY 6 HOURS PRN
Status: CANCELLED | OUTPATIENT
Start: 2023-04-29

## 2023-04-29 RX ORDER — OXYTOCIN/RINGER'S LACTATE 30/500 ML
95 PLASTIC BAG, INJECTION (ML) INTRAVENOUS ONCE
Status: DISCONTINUED | OUTPATIENT
Start: 2023-04-29 | End: 2023-05-02 | Stop reason: HOSPADM

## 2023-04-29 RX ORDER — DEXMEDETOMIDINE HYDROCHLORIDE 100 UG/ML
INJECTION, SOLUTION INTRAVENOUS
Status: DISCONTINUED | OUTPATIENT
Start: 2023-04-29 | End: 2023-04-29

## 2023-04-29 RX ORDER — MISOPROSTOL 200 UG/1
800 TABLET ORAL
Status: DISCONTINUED | OUTPATIENT
Start: 2023-04-29 | End: 2023-04-29

## 2023-04-29 RX ORDER — LIDOCAINE HYDROCHLORIDE 20 MG/ML
INJECTION, SOLUTION EPIDURAL; INFILTRATION; INTRACAUDAL; PERINEURAL
Status: DISCONTINUED | OUTPATIENT
Start: 2023-04-29 | End: 2023-04-29

## 2023-04-29 RX ORDER — DIPHENHYDRAMINE HCL 25 MG
25 CAPSULE ORAL EVERY 4 HOURS PRN
Status: DISCONTINUED | OUTPATIENT
Start: 2023-04-29 | End: 2023-05-02 | Stop reason: HOSPADM

## 2023-04-29 RX ORDER — OXYCODONE AND ACETAMINOPHEN 10; 325 MG/1; MG/1
1 TABLET ORAL EVERY 4 HOURS PRN
Status: DISCONTINUED | OUTPATIENT
Start: 2023-04-29 | End: 2023-05-01

## 2023-04-29 RX ORDER — CHLORHEXIDINE GLUCONATE ORAL RINSE 1.2 MG/ML
10 SOLUTION DENTAL 2 TIMES DAILY
Status: DISCONTINUED | OUTPATIENT
Start: 2023-04-29 | End: 2023-05-02 | Stop reason: HOSPADM

## 2023-04-29 RX ORDER — TRANEXAMIC ACID 100 MG/ML
INJECTION, SOLUTION INTRAVENOUS
Status: DISCONTINUED | OUTPATIENT
Start: 2023-04-29 | End: 2023-04-29

## 2023-04-29 RX ORDER — METHYLERGONOVINE MALEATE 0.2 MG/ML
200 INJECTION INTRAVENOUS
Status: CANCELLED | OUTPATIENT
Start: 2023-04-29

## 2023-04-29 RX ORDER — DIPHENOXYLATE HYDROCHLORIDE AND ATROPINE SULFATE 2.5; .025 MG/1; MG/1
1 TABLET ORAL 4 TIMES DAILY PRN
Status: DISCONTINUED | OUTPATIENT
Start: 2023-04-29 | End: 2023-04-29

## 2023-04-29 RX ORDER — OXYTOCIN 10 [USP'U]/ML
10 INJECTION, SOLUTION INTRAMUSCULAR; INTRAVENOUS ONCE AS NEEDED
Status: CANCELLED | OUTPATIENT
Start: 2023-04-29 | End: 2034-09-25

## 2023-04-29 RX ORDER — MORPHINE SULFATE 0.5 MG/ML
INJECTION, SOLUTION EPIDURAL; INTRATHECAL; INTRAVENOUS
Status: DISCONTINUED | OUTPATIENT
Start: 2023-04-29 | End: 2023-04-29

## 2023-04-29 RX ORDER — ACETAMINOPHEN 500 MG
1000 TABLET ORAL ONCE
Status: COMPLETED | OUTPATIENT
Start: 2023-04-29 | End: 2023-04-29

## 2023-04-29 RX ORDER — PROCHLORPERAZINE EDISYLATE 5 MG/ML
5 INJECTION INTRAMUSCULAR; INTRAVENOUS EVERY 6 HOURS PRN
Status: CANCELLED | OUTPATIENT
Start: 2023-04-29

## 2023-04-29 RX ORDER — KETOROLAC TROMETHAMINE 30 MG/ML
INJECTION, SOLUTION INTRAMUSCULAR; INTRAVENOUS
Status: DISCONTINUED | OUTPATIENT
Start: 2023-04-29 | End: 2023-04-29

## 2023-04-29 RX ORDER — FENTANYL/BUPIVACAINE/NS/PF 2MCG/ML-.1
PLASTIC BAG, INJECTION (ML) INJECTION
Status: COMPLETED
Start: 2023-04-29 | End: 2023-04-29

## 2023-04-29 RX ORDER — OXYTOCIN/RINGER'S LACTATE 30/500 ML
334 PLASTIC BAG, INJECTION (ML) INTRAVENOUS ONCE AS NEEDED
Status: CANCELLED | OUTPATIENT
Start: 2023-04-29 | End: 2034-09-25

## 2023-04-29 RX ORDER — ADHESIVE BANDAGE
30 BANDAGE TOPICAL 2 TIMES DAILY PRN
Status: DISCONTINUED | OUTPATIENT
Start: 2023-04-30 | End: 2023-05-02 | Stop reason: HOSPADM

## 2023-04-29 RX ORDER — DEXAMETHASONE SODIUM PHOSPHATE 4 MG/ML
INJECTION, SOLUTION INTRA-ARTICULAR; INTRALESIONAL; INTRAMUSCULAR; INTRAVENOUS; SOFT TISSUE
Status: DISCONTINUED | OUTPATIENT
Start: 2023-04-29 | End: 2023-04-29

## 2023-04-29 RX ORDER — CARBOPROST TROMETHAMINE 250 UG/ML
250 INJECTION, SOLUTION INTRAMUSCULAR
Status: DISCONTINUED | OUTPATIENT
Start: 2023-04-29 | End: 2023-04-29

## 2023-04-29 RX ORDER — OXYCODONE AND ACETAMINOPHEN 5; 325 MG/1; MG/1
1 TABLET ORAL EVERY 4 HOURS PRN
Status: DISCONTINUED | OUTPATIENT
Start: 2023-04-29 | End: 2023-05-01

## 2023-04-29 RX ADMIN — FENTANYL CITRATE 100 MCG: 0.05 INJECTION, SOLUTION INTRAMUSCULAR; INTRAVENOUS at 02:04

## 2023-04-29 RX ADMIN — SODIUM CHLORIDE, POTASSIUM CHLORIDE, SODIUM LACTATE AND CALCIUM CHLORIDE: 600; 310; 30; 20 INJECTION, SOLUTION INTRAVENOUS at 11:04

## 2023-04-29 RX ADMIN — DEXMEDETOMIDINE HYDROCHLORIDE 8 MCG: 100 INJECTION, SOLUTION INTRAVENOUS at 03:04

## 2023-04-29 RX ADMIN — AMPICILLIN 2 G: 2 INJECTION, POWDER, FOR SOLUTION INTRAMUSCULAR; INTRAVENOUS at 09:04

## 2023-04-29 RX ADMIN — CLINDAMYCIN PHOSPHATE 900 MG: 18 INJECTION, SOLUTION INTRAVENOUS at 02:04

## 2023-04-29 RX ADMIN — OXYTOCIN 3 UNITS: 10 INJECTION, SOLUTION INTRAMUSCULAR; INTRAVENOUS at 02:04

## 2023-04-29 RX ADMIN — LIDOCAINE HYDROCHLORIDE 7 ML: 20 INJECTION, SOLUTION EPIDURAL; INFILTRATION; INTRACAUDAL at 02:04

## 2023-04-29 RX ADMIN — CLINDAMYCIN PHOSPHATE 900 MG: 900 INJECTION, SOLUTION INTRAVENOUS at 10:04

## 2023-04-29 RX ADMIN — LIDOCAINE HYDROCHLORIDE 3 ML: 20 INJECTION, SOLUTION EPIDURAL; INFILTRATION; INTRACAUDAL at 02:04

## 2023-04-29 RX ADMIN — DEXTROSE 2 G: 50 INJECTION, SOLUTION INTRAVENOUS at 02:04

## 2023-04-29 RX ADMIN — DEXAMETHASONE SODIUM PHOSPHATE 4 MG: 4 INJECTION, SOLUTION INTRA-ARTICULAR; INTRALESIONAL; INTRAMUSCULAR; INTRAVENOUS; SOFT TISSUE at 02:04

## 2023-04-29 RX ADMIN — SODIUM CITRATE AND CITRIC ACID MONOHYDRATE 30 ML: 500; 334 SOLUTION ORAL at 01:04

## 2023-04-29 RX ADMIN — DOCUSATE SODIUM 200 MG: 100 CAPSULE, LIQUID FILLED ORAL at 08:04

## 2023-04-29 RX ADMIN — AMPICILLIN 2 G: 2 INJECTION, POWDER, FOR SOLUTION INTRAMUSCULAR; INTRAVENOUS at 01:04

## 2023-04-29 RX ADMIN — Medication 95 MILLI-UNITS/MIN: at 03:04

## 2023-04-29 RX ADMIN — ONDANSETRON 4 MG: 2 INJECTION INTRAMUSCULAR; INTRAVENOUS at 02:04

## 2023-04-29 RX ADMIN — MORPHINE SULFATE 1.5 MG: 0.5 INJECTION, SOLUTION EPIDURAL; INTRATHECAL; INTRAVENOUS at 02:04

## 2023-04-29 RX ADMIN — SODIUM CHLORIDE, POTASSIUM CHLORIDE, SODIUM LACTATE AND CALCIUM CHLORIDE 50 ML: 600; 310; 30; 20 INJECTION, SOLUTION INTRAVENOUS at 10:04

## 2023-04-29 RX ADMIN — OXYTOCIN 5 UNITS: 10 INJECTION, SOLUTION INTRAMUSCULAR; INTRAVENOUS at 03:04

## 2023-04-29 RX ADMIN — AZITHROMYCIN MONOHYDRATE 500 MG: 500 INJECTION, POWDER, LYOPHILIZED, FOR SOLUTION INTRAVENOUS at 01:04

## 2023-04-29 RX ADMIN — TRANEXAMIC ACID 1000 MG: 100 INJECTION, SOLUTION INTRAVENOUS at 02:04

## 2023-04-29 RX ADMIN — SODIUM CHLORIDE, POTASSIUM CHLORIDE, SODIUM LACTATE AND CALCIUM CHLORIDE 50 ML: 600; 310; 30; 20 INJECTION, SOLUTION INTRAVENOUS at 09:04

## 2023-04-29 RX ADMIN — ACETAMINOPHEN 1000 MG: 500 TABLET, FILM COATED ORAL at 01:04

## 2023-04-29 RX ADMIN — FAMOTIDINE 20 MG: 10 INJECTION, SOLUTION INTRAVENOUS at 01:04

## 2023-04-29 RX ADMIN — Medication 8 ML/HR: at 10:04

## 2023-04-29 RX ADMIN — ESCITALOPRAM OXALATE 20 MG: 10 TABLET ORAL at 09:04

## 2023-04-29 RX ADMIN — GENTAMICIN SULFATE 320 MG: 40 INJECTION, SOLUTION INTRAMUSCULAR; INTRAVENOUS at 02:04

## 2023-04-29 RX ADMIN — KETOROLAC TROMETHAMINE 30 MG: 30 INJECTION, SOLUTION INTRAMUSCULAR at 02:04

## 2023-04-29 RX ADMIN — OXYCODONE HYDROCHLORIDE 5 MG: 5 TABLET ORAL at 08:04

## 2023-04-29 RX ADMIN — LIDOCAINE HYDROCHLORIDE 5 ML: 20 INJECTION, SOLUTION EPIDURAL; INFILTRATION; INTRACAUDAL at 02:04

## 2023-04-29 NOTE — CARE UPDATE
Pt c/c/+1  Pushing since 11:45 am  Now 45 min  FHT 130s pos accels overall erasssuing  Cont pushing

## 2023-04-29 NOTE — TRANSFER OF CARE
"Anesthesia Transfer of Care Note    Patient: Can Wright    Procedure(s) Performed: * No procedures listed *    Patient location: Labor and Delivery    Anesthesia Type: general    Transport from OR: Transported from OR on room air with adequate spontaneous ventilation    Post pain: adequate analgesia    Post assessment: no apparent anesthetic complications and tolerated procedure well    Post vital signs: stable    Level of consciousness: awake and alert    Nausea/Vomiting: no nausea/vomiting    Complications: none    Transfer of care protocol was followed      Last vitals:   Visit Vitals  /75   Pulse 86   Temp (!) 38.4 °C (101.2 °F) (Axillary)   Resp 16   Ht 5' 1" (1.549 m)   Wt 88.3 kg (194 lb 10.7 oz)   LMP 08/01/2022   SpO2 98%   Breastfeeding No   BMI 36.78 kg/m²     "

## 2023-04-29 NOTE — CARE UPDATE
Introduced myself as staff on call to pt and   All questions answered  Last check 8cm at 0630  Pt comfortable with eipdural   FHT moderate variability   Cx q 1-2 min   Will recheck 2h or  or sooner as needed

## 2023-04-29 NOTE — PLAN OF CARE
Pt arrive to room via stretcher. Transfer to bed by self, towl and pads changed. IV patent and infusing with no problems. Corey present and patent, flowing with gravity. VSS. Fundus firm and midline, bleeding Moderately. Ice water and crackers at bedside. Oriented to room, nurse, call-bell, board, edu papers, and mother-baby guidebook. States understanding of Warning signs to look for. Edu on fall risk, states understanding to not get up without calling nurse. Bed in low position, HOB up, side rails up x2, CBIR, support person in room.      Problem: Infection (Labor)  Goal: Absence of Infection Signs and Symptoms  Outcome: Adequate for Care Transition     Problem: Bleeding (Labor)  Goal: Hemostasis  Outcome: Met     Problem: Change in Fetal Wellbeing (Labor)  Goal: Stable Fetal Wellbeing  Outcome: Met     Problem: Delayed Labor Progression (Labor)  Goal: Effective Progression to Delivery  Outcome: Met     Problem: Labor Pain (Labor)  Goal: Acceptable Pain Control  Outcome: Met     Problem: Uterine Tachysystole (Labor)  Goal: Normal Uterine Contraction Pattern  Outcome: Met     Problem:  Fall Injury Risk  Goal: Absence of Fall, Infant Drop and Related Injury  Outcome: Ongoing, Progressing     Problem: Adult Inpatient Plan of Care  Goal: Plan of Care Review  Outcome: Ongoing, Progressing  Goal: Patient-Specific Goal (Individualized)  Outcome: Ongoing, Progressing  Goal: Absence of Hospital-Acquired Illness or Injury  Outcome: Ongoing, Progressing  Goal: Optimal Comfort and Wellbeing  Outcome: Ongoing, Progressing  Goal: Readiness for Transition of Care  Outcome: Ongoing, Progressing     Problem: Infection  Goal: Absence of Infection Signs and Symptoms  Outcome: Ongoing, Progressing     Problem: Diabetes in Pregnancy  Goal: Blood Glucose Level Within Targeted Range  Outcome: Ongoing, Progressing     Problem: Device-Related Complication Risk (Anesthesia/Analgesia, Neuraxial)  Goal: Safe Infusion Delivery  Completion  Outcome: Ongoing, Progressing     Problem: Infection (Anesthesia/Analgesia, Neuraxial)  Goal: Absence of Infection Signs and Symptoms  Outcome: Ongoing, Progressing     Problem: Nausea and Vomiting (Anesthesia/Analgesia, Neuraxial)  Goal: Nausea and Vomiting Relief  Outcome: Ongoing, Progressing     Problem: Pain (Anesthesia/Analgesia, Neuraxial)  Goal: Effective Pain Control  Outcome: Ongoing, Progressing     Problem: Respiratory Compromise (Anesthesia/Analgesia, Neuraxial)  Goal: Effective Oxygenation and Ventilation  Outcome: Ongoing, Progressing     Problem: Sensorimotor Impairment (Anesthesia/Analgesia, Neuraxial)  Goal: Baseline Motor Function  Outcome: Ongoing, Progressing     Problem: Urinary Retention (Anesthesia/Analgesia, Neuraxial)  Goal: Effective Urinary Elimination  Outcome: Ongoing, Progressing     Problem: Fall Injury Risk  Goal: Absence of Fall and Fall-Related Injury  Outcome: Ongoing, Progressing     Problem: Pain Acute  Goal: Acceptable Pain Control and Functional Ability  Outcome: Ongoing, Progressing     Problem: Breastfeeding  Goal: Effective Breastfeeding  Outcome: Ongoing, Progressing     Problem: Adjustment to Role Transition (Postpartum  Delivery)  Goal: Successful Maternal Role Transition  Outcome: Ongoing, Progressing     Problem: Bleeding (Postpartum  Delivery)  Goal: Hemostasis  Outcome: Ongoing, Progressing     Problem: Infection (Postpartum  Delivery)  Goal: Absence of Infection Signs and Symptoms  Outcome: Ongoing, Progressing     Problem: Pain (Postpartum  Delivery)  Goal: Acceptable Pain Control  Outcome: Ongoing, Progressing     Problem: Postoperative Nausea and Vomiting (Postpartum  Delivery)  Goal: Nausea and Vomiting Relief  Outcome: Ongoing, Progressing     Problem: Postoperative Urinary Retention (Postpartum  Delivery)  Goal: Effective Urinary Elimination  Outcome: Ongoing, Progressing

## 2023-04-29 NOTE — L&D DELIVERY NOTE
[facility] - Labor & Delivery   Section   Operative Note    SUMMARY      Section Procedure Note    Procedure Date: 2023    Procedure:   1. Primary  Section via pfannenstiel skin incision    Indications:   1.  Maternal Exhaustion  2. Failure to descend past +1 with 2 hrs of pushing    Pre-operative Diagnosis:   1. IUP at 38w5d pregnancy  2. gHTN  3. A1GDM  4. Anxiety/Depression  5. Obesity   6. AMA   7. Chiari Malformation, Type 1  8. Syringomyelia   9. Chorioamnionitis     Post-operative Diagnosis:   2-9. Same as above   10. Status post pLTCS   11. Postpartum Hemorrhage - not due to atony but due to left uterine extension bleeding and left uterine artery until it was under control    Surgeon: Sohan Graf MD     Assistants: Pippa Moran MD-PGY-4; Melissa Lopez MD-PGY-2    Anesthesia: Epidural anesthesia    Findings:    1. Single viable  female infant, with APGARS 7/9, weight 2910 g.   2. Normal appearing uterus with serosal adhesions which were noted to have shearing prior to  placement of bladder blade, normal appearing ovarian and fallopian tubes.  3. Normal placenta.   4. Bladder backfilled with 180 cc sterile milk and no evidence of extravasation.   4. Excellent hemostasis noted following closure of each tissue layer.    Estimated Blood Loss:  1355 mL           Total IV Fluids: See anesthesia report.      UOP: 100 cc     Specimens: Placenta, discarded.     PreOp CBC:   Lab Results   Component Value Date    WBC 14.13 (H) 2023    HGB 12.2 2023    HCT 37.7 2023    MCV 83 2023     2023                     Complications:  None; patient tolerated the procedure well.           Disposition: PACU - hemodynamically stable.           Condition: stable    Procedure Details:   The patient was seen in L&D labor room in the setting of 1.5 hours of pushing with minimal descensus and OP fetal position and new onset diagnosis of chorioamnionitis. The  risks, benefits, complications, treatment options, and expected outcomes were discussed with the patient.  The patient desired proceeding to  delivery in the setting of maternal exhaustion. The patient concurred with the proposed plan, giving informed consent. The patient was taken to operating room, identified as Can Wright and the procedure verified as primary  delivery. A time out was held and the above information confirmed.    After anesthesia was found to be adequate, the patient was prepped and draped in the usual sterile manner while placed in a dorsal supine position with a left lateral tilt. Fetal pillow was inserted in normal fashion and inflated and vaginal preparation was completed with chlorhexidine. A blanton catheter was previously placed per nursing and urine noted to be blood-tinged. Antibiotics, gent/clinda/amp were ongoing and azithromycin was administered and an allis test was performed yielding adequate anesthesia.  A Pfannenstiel incision was made and carried down through the subcutaneous tissue to the fascia. Fascial incision was made and extended transversely. The fascia was grasped with Kocher clamps and  from the underlying rectus tissue superiorly and inferiorly. The peritoneum was identified, found to be free of adherent bowel and entered bluntly. Peritoneal incision was extended longitudinally. Uterine serosal adhesions were noted and inadvertently sheared with placement of bladder blade. A low transverse uterine incision was made with knife and extended with finger fracture. The amniotic sac was previously ruptured with clear amnionitic fluid and the infant was noted to be in vertex position. The vertex was brought to the incision and elevated out of the pelvis. The patient delivered a single viable female infant without difficulty.  Infant weighed 2910 grams with Apgar scores of 7/9 at one and five minutes respectively. After the umbilical cord was  clamped and cut cord blood and cord gases were obtained for evaluation. The placenta was removed intact and appeared normal and was discarded. The uterus was exteriorized. The uterine outline, tubes and ovaries appeared normal. Maternal left-side deep hysterotomy extension was noted. The uterine incision was closed with running locked sutures of 1 PDS in multiple layers. Continued bleeding noted at the midline of the hysterotomy and repaired with 1 PDS and 1 Chromic in multiple figure-eight sutures. TXA was administered for continued oozing in the setting of minimal uterine atony. Hemostasis was observed. The abdominal cavity was mopped to remove blood clots. Incision was reinspected and good hemostasis was noted. The uterus was returned to the abdominal cavity. The peritoneum and muscle was reapproximated with 2-0 vicryl in running fashion and horizontal mattress x2, respectively. The fascia was then reapproximated with running sutures of 2-0 vicryl. The subcutaneous fat and skin was reapproximated with 2-0 vicryl in running fashion x2 layers and 4-0 monocryl in running fashion, respectively. The wound was covered with a pressure dressing.     Instrument, sponge, and needle counts were correct prior the abdominal closure and at the conclusion of the case.    Pt tolerated procedure well and was in stable condition after the procedure.    **NOTE: This patient IS a candidate for trial of labor after  delivery**  But I do not recommend as pelvis noted to be very narrow     Melissa Lopez MD/MPH  Ob/Gyn PGY-2  [facility]     Specimens:   Specimen (24h ago, onward)      None            Condition: Good    Disposition: PACU - hemodynamically stable.    Attestation: Good       Delivery Information for Mk Wright    Birth information:  YOB: 2024   Time of birth: 2:31 PM   Sex: female   Head Delivery Date/Time: 2023  2:31 PM   Delivery type: , Low Transverse  "  Gestational Age: 38w5d  Unknown    Delivery Providers    Delivering clinician: Sohan Graf MD   Provider Role    TATA Wen MD Alexia P. Novara, MD Colleen G. Martel, MD               Measurements    Weight: 2910 g  Weight (lbs): 6 lb 6.7 oz  Length: 48.9 cm  Length (in): 19.25"  Head circumference: 34.3 cm  Chest circumference: 32.4 cm         Apgars    Living status: Living  Apgars:  1 min.:  5 min.:  10 min.:  15 min.:  20 min.:    Skin color:  0  1       Heart rate:  2  2       Reflex irritability:  2  2       Muscle tone:  2  2       Respiratory effort:  1  2       Total:  7  9       Apgars assigned by: MARILEE JOEL RN             Shoulder Dystocia    Shoulder dystocia present?: No           Presentation    Presentation: Vertex           Interventions/Resuscitation    Method: Bulb Suctioning, Deep Suctioning, Tactile Stimulation, Blow By Oxygen, CPAP       Cord    Vessels: 3 vessels  Complications: None  Delayed Cord Clamping?: No  Cord Clamped Date/Time: 2023  2:31 PM  Cord Blood Disposition: Sent with Baby  Gases Sent?: Yes       Placenta    Placenta delivery date/time: 2023 1432  Placenta removal: Manual removal  Placenta appearance: Intact  Placenta disposition: Discarded           Labor Events:       labor: No     Labor Onset Date/Time: 2023 21:26     Dilation Complete Date/Time: 2023 11:37     Start Pushing Date/Time: 2023 11:50       Start Pushing Date/Time: 2023 11:50     Rupture Date/Time: 23           Rupture type: ARM (Artificial Rupture)           Fluid Amount:         Fluid Color: Clear                 steroids: None     Antibiotics given for GBS: No     Induction: balloon dilation (Corey);misoprostol     Indications for induction:  Hypertension;Gestational Diabetic     Augmentation: amniotomy;oxytocin     Indications for augmentation: Ineffective Contraction Pattern   "   Labor complications:       Additional complications:          Cervical [id number]        Misoprostol          Delivery:      Episiotomy:       Indication for Episiotomy:       Perineal Lacerations:   Repaired:      Periurethral Laceration:   Repaired:     Labial Laceration:   Repaired:     Sulcus Laceration:   Repaired:     Vaginal Laceration:   Repaired:     Cervical Laceration:   Repaired:     Repair suture:       Repair # of packets:       Last Value - EBL - Nursing (mL):       Sum - EBL - Nursing (mL): 0     Last Value - EBL - Anesthesia (mL):        Calculated QBL (mL): 1355        Vaginal Sweep Performed:       Surgicount Correct:       Vaginal Packing:   Quantity:       Other providers:       Anesthesia    Method: Epidural          Details (if applicable):  Trial of Labor Yes    Categorization: Primary    Priority: Urgent   Indications for : Diagnosis of second stage arrest (no descent or rotation)   Incision Type: low transverse     Additional  information:  Forceps:    Vacuum:    Breech:    Observed anomalies    Other (Comments):

## 2023-04-29 NOTE — LACTATION NOTE
This note was copied from a baby's chart.  Breastfeeding Guide @ bedside.  Discussed:    What the Guidebook contains and where to chart feedings and diapers.   Supply and Demand:  The more you nurse the baby the more milk you will make.  Colostrum, baby's stomach size and intake.   1 void and 1 stool for first day.   What to expect on Day one.  Feed your baby On Cue at the earliest sign of hunger or need for comfort:  Sucking on fingers or hands  Bringing hands toward his mouth  Rooting or reaching for something to suck on  Sucking motions with mouth  Fretful noises  Crying is a late sign of hunger or comfort.      - If the baby is sleepy and wont wake for a feeding, put the baby skin-to-skin dressed in a diaper against the mothers bare chest  - Sleep with your baby near you in the hospital room  - Call the nurse/lactation consultant for additional assistance as needed.    States understand and verbalized appropriate recall of all information.

## 2023-04-29 NOTE — PROGRESS NOTES
"LABOR NOTE    MD to bedside for cervical check     S:  Complaints: No.  Epidural working:   yes    O: /63   Pulse 60   Temp 98.7 °F (37.1 °C) (Oral)   Resp 16   Ht 5' 1" (1.549 m)   Wt 88.3 kg (194 lb 10.7 oz)   LMP 08/01/2022   SpO2 98%   Breastfeeding No   BMI 36.78 kg/m²     FHT: 130 bpm, +accels, -decels, moderate variability, Cat 1 (reassuring)  CTX: q1min  SVE: 3/80/-3    Timeline:   2130: cl/th/hi, cytotec given   0130: cl/70/-3, blanton attempted but not able to be placed after several attempts, will give additional dose of cytotec   0530: cl/70/-3, incredible difficulty with attempted blanton placement, unable to place   0845: 1/70/-3, cervical blanton placed, pit @ 8  1150: 3/70/-3, pit @ 16  1700: 3/70/-3, pit @ 20, unable to rupture  2115: 3/70/-3, AROM clear, IUPC placed, pit @ 20  0140: 3/80/-3, pit decreased by nursing for tachysystole    PLAN:  Continue Close Maternal/Fetal Monitoring  Pitocin augmentation per protocol   Recheck in 2-4hr or PRN      Mireya Das M.D.  OB/GYN PGY-4    "

## 2023-04-29 NOTE — CARE UPDATE
Resident to bedside for cervical check  SVE 10/100/0  FHT cat 1  Plan to set up for pushing    Pippa Moran M.D.   OB/GYN  PGY-4

## 2023-04-29 NOTE — CARE UPDATE
MD to bedside to assess patient status  Patient now with dx of chorio given temp x2. Started on amp/gent.     Patient pushing to +1 station with caput and not making any descensus with maternal effort after 1.5 hours of pushing.     Counseled that she still has time for continued pushing but higher chance of failure with this given all her risk factors now with new infection and persistent OP fetal position.     Patient at this time desires to stop pushing and move forward with a primary .     Will turn off pitocin and plan to move to OR.   FHT with baseline 160, remain cat 1 at this time    Case request placed for   Staff notified.     Pippa Moran M.D.   OB/GYN  PGY-4

## 2023-04-29 NOTE — PROGRESS NOTES
"LABOR NOTE    MD to bedside for cervical check     S:  Complaints: No.  Epidural working:   yes    O: /80   Pulse 68   Temp 98.7 °F (37.1 °C)   Resp 16   Ht 5' 1" (1.549 m)   Wt 88.3 kg (194 lb 10.7 oz)   LMP 08/01/2022   SpO2 100%   Breastfeeding No   BMI 36.78 kg/m²     FHT: 135 bpm, +accels, -decels, moderate variability, Cat 1 (reassuring)  CTX: q1-2min  SVE: 9.5/90/0 @0915    Timeline:   2130: cl/th/hi, cytotec given   0130: cl/70/-3, blanton attempted but not able to be placed after several attempts, will give additional dose of cytotec   0530: cl/70/-3, incredible difficulty with attempted blanton placement, unable to place   0845: 1/70/-3, cervical blanton placed, pit @ 8  1150: 3/70/-3, pit @ 16  1700: 3/70/-3, pit @ 20, unable to rupture  2115: 3/70/-3, AROM clear, IUPC placed, pit @ 20  0140: 3/80/-3, pit decreased by nursing for tachysystole  0630: 8/90/0  0915: 9.5/90/0    PLAN:  Continue Close Maternal/Fetal Monitoring  Pitocin augmentation per protocol   Recheck in 2hr or PRN      Pippa Moran M.D.   OB/GYN  PGY-4    "

## 2023-04-29 NOTE — PROGRESS NOTES
"LABOR NOTE    MD to bedside for cervical check     S:  Complaints: No.  Epidural working:   yes    O: BP (!) 112/58   Pulse 65   Temp 98.7 °F (37.1 °C) (Oral)   Resp 16   Ht 5' 1" (1.549 m)   Wt 88.3 kg (194 lb 10.7 oz)   LMP 08/01/2022   SpO2 96%   Breastfeeding No   BMI 36.78 kg/m²     FHT: 130 bpm, +accels, -decels, moderate variability, Cat 1 (reassuring)  CTX: q1min  SVE: 8/90/0, check per Dr. Lopez    Timeline:   2130: cl/th/hi, cytotec given   0130: cl/70/-3, blanton attempted but not able to be placed after several attempts, will give additional dose of cytotec   0530: cl/70/-3, incredible difficulty with attempted blanton placement, unable to place   0845: 1/70/-3, cervical blanton placed, pit @ 8  1150: 3/70/-3, pit @ 16  1700: 3/70/-3, pit @ 20, unable to rupture  2115: 3/70/-3, AROM clear, IUPC placed, pit @ 20  0140: 3/80/-3, pit decreased by nursing for tachysystole  0630: 8/90/0    PLAN:  Continue Close Maternal/Fetal Monitoring  Pitocin augmentation per protocol   Recheck in 2hr or PRN      Pippa Moran M.D.   OB/GYN  PGY-4    "

## 2023-04-29 NOTE — PROGRESS NOTES
"LABOR NOTE    MD to bedside for cervical check     S:  Complaints: No.  Epidural working:   yes    O: /67   Pulse (!) 54   Temp 98.7 °F (37.1 °C) (Oral)   Resp 16   Ht 5' 1" (1.549 m)   Wt 88.3 kg (194 lb 10.7 oz)   LMP 08/01/2022   SpO2 100%   Breastfeeding No   BMI 36.78 kg/m²     FHT: 130 bpm, +accels, -decels, moderate variability, Cat 1 (reassuring)  CTX: q1-2min    SVE: 3/60/-3, AROM clear, IUPC placed, pit @ 20    Timeline:   2130: cl/th/hi, cytotec given   0130: cl/70/-3, blanton attempted but not able to be placed after several attempts, will give additional dose of cytotec   0530: cl/70/-3, incredible difficulty with attempted blanton placement, unable to place   0845: 1/70/-3, cervical blanton placed, pit @ 8  1150: 3/70/-3, pit @ 16  1700: 3/70/-3, pit @ 20, unable to rupture  2115: 3/70/-3, AROM clear, IUPC placed, pit @ 20    PLAN:  Continue Close Maternal/Fetal Monitoring  Pitocin augmentation per protocol   Recheck in 2-4hr or PRN      Raven Clark MD   OB/GYN PGY1  [facility]    "

## 2023-04-30 LAB
BASOPHILS NFR BLD: 0 % (ref 0–1.9)
DIFFERENTIAL METHOD: ABNORMAL
EOSINOPHIL NFR BLD: 0 % (ref 0–8)
ERYTHROCYTE [DISTWIDTH] IN BLOOD BY AUTOMATED COUNT: 14.8 % (ref 11.5–14.5)
GLUCOSE SERPL-MCNC: 83 MG/DL (ref 70–110)
HCT VFR BLD AUTO: 26.8 % (ref 37–48.5)
HGB BLD-MCNC: 8.8 G/DL (ref 12–16)
IMM GRANULOCYTES # BLD AUTO: ABNORMAL K/UL (ref 0–0.04)
IMM GRANULOCYTES NFR BLD AUTO: ABNORMAL % (ref 0–0.5)
LYMPHOCYTES NFR BLD: 8 % (ref 18–48)
MCH RBC QN AUTO: 27.4 PG (ref 27–31)
MCHC RBC AUTO-ENTMCNC: 32.8 G/DL (ref 32–36)
MCV RBC AUTO: 84 FL (ref 82–98)
MONOCYTES NFR BLD: 3 % (ref 4–15)
NEUTROPHILS NFR BLD: 89 % (ref 38–73)
NRBC BLD-RTO: 0 /100 WBC
PLATELET # BLD AUTO: 260 K/UL (ref 150–450)
PLATELET BLD QL SMEAR: ABNORMAL
PMV BLD AUTO: 11.3 FL (ref 9.2–12.9)
POCT GLUCOSE: 83 MG/DL (ref 70–110)
RBC # BLD AUTO: 3.21 M/UL (ref 4–5.4)
WBC # BLD AUTO: 25.94 K/UL (ref 3.9–12.7)

## 2023-04-30 PROCEDURE — 25000003 PHARM REV CODE 250

## 2023-04-30 PROCEDURE — 63600175 PHARM REV CODE 636 W HCPCS: Performed by: STUDENT IN AN ORGANIZED HEALTH CARE EDUCATION/TRAINING PROGRAM

## 2023-04-30 PROCEDURE — 85027 COMPLETE CBC AUTOMATED: CPT

## 2023-04-30 PROCEDURE — 11000001 HC ACUTE MED/SURG PRIVATE ROOM

## 2023-04-30 PROCEDURE — 63600175 PHARM REV CODE 636 W HCPCS: Performed by: OBSTETRICS & GYNECOLOGY

## 2023-04-30 PROCEDURE — 36415 COLL VENOUS BLD VENIPUNCTURE: CPT

## 2023-04-30 PROCEDURE — 63600175 PHARM REV CODE 636 W HCPCS

## 2023-04-30 PROCEDURE — 25000003 PHARM REV CODE 250: Performed by: STUDENT IN AN ORGANIZED HEALTH CARE EDUCATION/TRAINING PROGRAM

## 2023-04-30 PROCEDURE — 99232 SBSQ HOSP IP/OBS MODERATE 35: CPT | Mod: ,,, | Performed by: OBSTETRICS & GYNECOLOGY

## 2023-04-30 PROCEDURE — 85007 BL SMEAR W/DIFF WBC COUNT: CPT

## 2023-04-30 PROCEDURE — 99232 PR SUBSEQUENT HOSPITAL CARE,LEVL II: ICD-10-PCS | Mod: ,,, | Performed by: OBSTETRICS & GYNECOLOGY

## 2023-04-30 PROCEDURE — 82947 ASSAY GLUCOSE BLOOD QUANT: CPT

## 2023-04-30 RX ORDER — HYDROXYZINE HYDROCHLORIDE 25 MG/1
25 TABLET, FILM COATED ORAL NIGHTLY PRN
Status: DISCONTINUED | OUTPATIENT
Start: 2023-04-30 | End: 2023-05-02 | Stop reason: HOSPADM

## 2023-04-30 RX ORDER — IRON POLYSACCHARIDE COMPLEX 150 MG
150 CAPSULE ORAL DAILY
Status: DISCONTINUED | OUTPATIENT
Start: 2023-05-01 | End: 2023-05-01

## 2023-04-30 RX ADMIN — DOCUSATE SODIUM 200 MG: 100 CAPSULE, LIQUID FILLED ORAL at 09:04

## 2023-04-30 RX ADMIN — GENTAMICIN SULFATE 320 MG: 40 INJECTION, SOLUTION INTRAMUSCULAR; INTRAVENOUS at 01:04

## 2023-04-30 RX ADMIN — SODIUM CHLORIDE, POTASSIUM CHLORIDE, SODIUM LACTATE AND CALCIUM CHLORIDE 50 ML: 600; 310; 30; 20 INJECTION, SOLUTION INTRAVENOUS at 05:04

## 2023-04-30 RX ADMIN — CLINDAMYCIN PHOSPHATE 900 MG: 900 INJECTION, SOLUTION INTRAVENOUS at 02:04

## 2023-04-30 RX ADMIN — ACETAMINOPHEN 650 MG: 325 TABLET, FILM COATED ORAL at 01:04

## 2023-04-30 RX ADMIN — HYDROXYZINE HYDROCHLORIDE 25 MG: 25 TABLET ORAL at 12:04

## 2023-04-30 RX ADMIN — ACETAMINOPHEN 650 MG: 325 TABLET, FILM COATED ORAL at 12:04

## 2023-04-30 RX ADMIN — AMPICILLIN 2 G: 2 INJECTION, POWDER, FOR SOLUTION INTRAMUSCULAR; INTRAVENOUS at 04:04

## 2023-04-30 RX ADMIN — ESCITALOPRAM OXALATE 20 MG: 10 TABLET ORAL at 09:04

## 2023-04-30 RX ADMIN — OXYCODONE HYDROCHLORIDE AND ACETAMINOPHEN 1 TABLET: 10; 325 TABLET ORAL at 08:04

## 2023-04-30 RX ADMIN — AMPICILLIN 2 G: 2 INJECTION, POWDER, FOR SOLUTION INTRAMUSCULAR; INTRAVENOUS at 03:04

## 2023-04-30 RX ADMIN — ENOXAPARIN SODIUM 40 MG: 40 INJECTION SUBCUTANEOUS at 05:04

## 2023-04-30 RX ADMIN — SODIUM CHLORIDE, POTASSIUM CHLORIDE, SODIUM LACTATE AND CALCIUM CHLORIDE 50 ML: 600; 310; 30; 20 INJECTION, SOLUTION INTRAVENOUS at 03:04

## 2023-04-30 RX ADMIN — OXYCODONE HYDROCHLORIDE 10 MG: 5 TABLET ORAL at 09:04

## 2023-04-30 RX ADMIN — AMPICILLIN 2 G: 2 INJECTION, POWDER, FOR SOLUTION INTRAMUSCULAR; INTRAVENOUS at 10:04

## 2023-04-30 RX ADMIN — KETOROLAC TROMETHAMINE 30 MG: 30 INJECTION, SOLUTION INTRAMUSCULAR; INTRAVENOUS at 05:04

## 2023-04-30 RX ADMIN — PRENATAL VIT W/ FE FUMARATE-FA TAB 27-0.8 MG 1 TABLET: 27-0.8 TAB at 09:04

## 2023-04-30 RX ADMIN — ACETAMINOPHEN 650 MG: 325 TABLET, FILM COATED ORAL at 05:04

## 2023-04-30 RX ADMIN — KETOROLAC TROMETHAMINE 30 MG: 30 INJECTION, SOLUTION INTRAMUSCULAR; INTRAVENOUS at 01:04

## 2023-04-30 RX ADMIN — ACETAMINOPHEN 650 MG: 325 TABLET, FILM COATED ORAL at 06:04

## 2023-04-30 RX ADMIN — KETOROLAC TROMETHAMINE 30 MG: 30 INJECTION, SOLUTION INTRAMUSCULAR; INTRAVENOUS at 06:04

## 2023-04-30 RX ADMIN — KETOROLAC TROMETHAMINE 30 MG: 30 INJECTION, SOLUTION INTRAMUSCULAR; INTRAVENOUS at 12:04

## 2023-04-30 RX ADMIN — CLINDAMYCIN PHOSPHATE 900 MG: 900 INJECTION, SOLUTION INTRAVENOUS at 05:04

## 2023-04-30 NOTE — PROGRESS NOTES
POSTPARTUM PROGRESS NOTE    Subjective:     PPD/POD#: 1   Procedure: Primary LTCS (maternal exhaustion)   EGA: 38w5d   N/V: No   F/C: No   Abd Pain: Mild, well-controlled with oral pain medication   Lochia: Mild   Voiding: Yes via blanton   Ambulating: Yes   Bowel fnc: No   Breastfeeding: Yes   Contraception: Per primary OB   Circumcision: N/A, female     Objective:      Temp:  [97.6 °F (36.4 °C)-101.2 °F (38.4 °C)] 97.6 °F (36.4 °C)  Pulse:  [] 71  Resp:  [16-18] 18  SpO2:  [89 %-100 %] 96 %  BP: ()/(52-83) 126/64    Lung: Normal respiratory effort   Abdomen: Soft, appropriately tender   Uterus: Firm, no fundal tenderness   Incision: Bandage in place without shadowing   : Blanton in place   Extremities: Bilateral trace edema     Lab Review    Recent Labs   Lab 04/25/23  1207 04/27/23 2009   * 136   K 4.3 3.4*    108   CO2 20* 17*   BUN 9 11   CREATININE 0.8 0.8   GLU 69* 175*   PROT 6.8 6.8   BILITOT 0.3 0.2   ALKPHOS 144* 135   ALT 11 14   AST 18 15         Recent Labs   Lab 04/25/23  1207 04/27/23 2009   WBC 11.50 14.13*   HGB 12.3 12.2   HCT 38.4 37.7   MCV 84 83    304           I/O    Intake/Output Summary (Last 24 hours) at 4/30/2023 0406  Last data filed at 4/30/2023 0000  Gross per 24 hour   Intake 2807.15 ml   Output 2805 ml   Net 2.15 ml          Assessment and Plan:   Postpartum care:  - Patient doing well.  - Continue routine management and advances.  - Meets criteria for lovenox 40qd for PPX    gHTN  - BP as above  - asymptomatic  - preE labs as above  - UOP: 0.75 cc/kg/hr  - Mag: not indicated  - Hypertensive agent not indicated    Chorioamnionitis  - VS as above  - currently afebrile  - no fundal tenderness  - Ampicillin/Gentamicin: continue for 24 hours post delivery  - Clindamycin: continue for 24 hours    Mood Disorder  - Mood stable  - Medications: cont home lexapro  - Will need 1-2 week postpartum mood check    A1 GDM  - Fasting BG this AM pending  - Will need 2  hour gtt at postpartum visit     Obesity  - Prepreg BMI <40  - encourage ambulation    Chiari 1 malformation and syringomyelia  - followed by neurosurg outpatient  - asymptomatic    Melissa Lopez MD/MPH  OB/GYN PGY-2  [facility]

## 2023-04-30 NOTE — ANESTHESIA POSTPROCEDURE EVALUATION
Anesthesia Post Evaluation    Patient: Can Wright    Procedure(s) Performed: * No procedures listed *    Final Anesthesia Type: epidural      Patient location during evaluation: floor  Patient participation: Yes- Able to Participate  Level of consciousness: awake and alert  Post-procedure vital signs: reviewed and stable  Pain management: adequate  Airway patency: patent  ILIA mitigation strategies: Use of major conduction anesthesia (spinal/epidural) or peripheral nerve block and Multimodal analgesia  PONV status at discharge: No PONV  Anesthetic complications: no      Cardiovascular status: blood pressure returned to baseline and hemodynamically stable  Respiratory status: unassisted, spontaneous ventilation and room air  Hydration status: euvolemic  Follow-up not needed.          Vitals Value Taken Time   /61 04/30/23 1153   Temp 37.3 °C (99.2 °F) 04/30/23 1153   Pulse 69 04/30/23 1153   Resp 18 04/30/23 1153   SpO2 98 % 04/30/23 1153         No case tracking events are documented in the log.      Pain/Yonas Score: Pain Rating Prior to Med Admin: 5 (4/30/2023 12:28 PM)  Pain Rating Post Med Admin: 3 (4/29/2023  9:00 PM)

## 2023-04-30 NOTE — PLAN OF CARE
Patient safety maintained, side rails up, bed low and locked position. Pt ambulating and voiding independently.  Pain well controlled with scheduled and PRN pain medication. Fundus midline, firm, with scant  lochia. Patient responding to infant cues.  Incision site covered with hydrocolloid dressing; dressing clean, dry, and intact.  Significant other at bedside and assisting in patient's care. Will continue to monitor and intervene as necessary  Panic attack occurred during shift at 0040, MD notified, hydroxyzine ordered and given. .

## 2023-04-30 NOTE — LACTATION NOTE
04/30/23 1130   Maternal Assessment   Breast Shape Bilateral:;round   Breast Density Bilateral:;soft   Areola Bilateral:;elastic   Nipples Bilateral:;everted   Maternal Infant Feeding   Maternal Emotional State assist needed   Equipment Type   Breast Pump Type double electric, hospital grade   Breast Pump Flange Type hard   Breast Pump Flange Size 24 mm   Breast Pumping   Breast Pumping Interventions frequent pumping encouraged     Pt reports the baby can not beastfeed at this time bc of the monitoring of blood sugars. Assisted pt with use of breastpump and hand expression. Education provided. Colostrum drops collected in bottle with hand expression. Support and encouragement given.

## 2023-04-30 NOTE — PLAN OF CARE
Patient safety maintained, side rails up, bed low and locked position. Pt ambulating and voiding independently.  Pain well controlled with PRN pain medication. Fundus midline, firm, with moderate  lochia. Patient responding to infant cues.  Incision site dressing clean, dry, and intact. S/P antibiotics.  Significant other at bedside and assisting in patient's care. Will continue to monitor.      Problem: Infection (Labor)  Goal: Absence of Infection Signs and Symptoms  Outcome: Met     Problem:  Fall Injury Risk  Goal: Absence of Fall, Infant Drop and Related Injury  Outcome: Ongoing, Progressing     Problem: Adult Inpatient Plan of Care  Goal: Plan of Care Review  Outcome: Ongoing, Progressing  Goal: Patient-Specific Goal (Individualized)  Outcome: Ongoing, Progressing  Goal: Absence of Hospital-Acquired Illness or Injury  Outcome: Ongoing, Progressing  Goal: Optimal Comfort and Wellbeing  Outcome: Ongoing, Progressing  Goal: Readiness for Transition of Care  Outcome: Ongoing, Progressing     Problem: Infection  Goal: Absence of Infection Signs and Symptoms  Outcome: Ongoing, Progressing     Problem: Diabetes in Pregnancy  Goal: Blood Glucose Level Within Targeted Range  Outcome: Ongoing, Progressing     Problem: Device-Related Complication Risk (Anesthesia/Analgesia, Neuraxial)  Goal: Safe Infusion Delivery Completion  Outcome: Ongoing, Progressing     Problem: Infection (Anesthesia/Analgesia, Neuraxial)  Goal: Absence of Infection Signs and Symptoms  Outcome: Ongoing, Progressing     Problem: Nausea and Vomiting (Anesthesia/Analgesia, Neuraxial)  Goal: Nausea and Vomiting Relief  Outcome: Ongoing, Progressing     Problem: Pain (Anesthesia/Analgesia, Neuraxial)  Goal: Effective Pain Control  Outcome: Ongoing, Progressing     Problem: Respiratory Compromise (Anesthesia/Analgesia, Neuraxial)  Goal: Effective Oxygenation and Ventilation  Outcome: Ongoing, Progressing     Problem: Sensorimotor Impairment  (Anesthesia/Analgesia, Neuraxial)  Goal: Baseline Motor Function  Outcome: Ongoing, Progressing     Problem: Urinary Retention (Anesthesia/Analgesia, Neuraxial)  Goal: Effective Urinary Elimination  Outcome: Ongoing, Progressing     Problem: Fall Injury Risk  Goal: Absence of Fall and Fall-Related Injury  Outcome: Ongoing, Progressing     Problem: Pain Acute  Goal: Acceptable Pain Control and Functional Ability  Outcome: Ongoing, Progressing     Problem: Breastfeeding  Goal: Effective Breastfeeding  Outcome: Ongoing, Progressing     Problem: Adjustment to Role Transition (Postpartum  Delivery)  Goal: Successful Maternal Role Transition  Outcome: Ongoing, Progressing     Problem: Bleeding (Postpartum  Delivery)  Goal: Hemostasis  Outcome: Ongoing, Progressing     Problem: Infection (Postpartum  Delivery)  Goal: Absence of Infection Signs and Symptoms  Outcome: Ongoing, Progressing     Problem: Pain (Postpartum  Delivery)  Goal: Acceptable Pain Control  Outcome: Ongoing, Progressing     Problem: Postoperative Nausea and Vomiting (Postpartum  Delivery)  Goal: Nausea and Vomiting Relief  Outcome: Ongoing, Progressing     Problem: Postoperative Urinary Retention (Postpartum  Delivery)  Goal: Effective Urinary Elimination  Outcome: Ongoing, Progressing

## 2023-05-01 PROCEDURE — 25000003 PHARM REV CODE 250

## 2023-05-01 PROCEDURE — 99232 SBSQ HOSP IP/OBS MODERATE 35: CPT | Mod: ,,, | Performed by: OBSTETRICS & GYNECOLOGY

## 2023-05-01 PROCEDURE — 63600175 PHARM REV CODE 636 W HCPCS: Performed by: STUDENT IN AN ORGANIZED HEALTH CARE EDUCATION/TRAINING PROGRAM

## 2023-05-01 PROCEDURE — 25000003 PHARM REV CODE 250: Performed by: STUDENT IN AN ORGANIZED HEALTH CARE EDUCATION/TRAINING PROGRAM

## 2023-05-01 PROCEDURE — 99232 PR SUBSEQUENT HOSPITAL CARE,LEVL II: ICD-10-PCS | Mod: ,,, | Performed by: OBSTETRICS & GYNECOLOGY

## 2023-05-01 PROCEDURE — 11000001 HC ACUTE MED/SURG PRIVATE ROOM

## 2023-05-01 RX ORDER — OXYCODONE HYDROCHLORIDE 5 MG/1
10 TABLET ORAL EVERY 4 HOURS PRN
Status: DISCONTINUED | OUTPATIENT
Start: 2023-05-01 | End: 2023-05-02 | Stop reason: HOSPADM

## 2023-05-01 RX ORDER — IBUPROFEN 600 MG/1
600 TABLET ORAL EVERY 6 HOURS
Status: DISCONTINUED | OUTPATIENT
Start: 2023-05-01 | End: 2023-05-02 | Stop reason: HOSPADM

## 2023-05-01 RX ORDER — DOCUSATE SODIUM 100 MG/1
100 CAPSULE, LIQUID FILLED ORAL 2 TIMES DAILY
Qty: 30 CAPSULE | Refills: 1 | Status: SHIPPED | OUTPATIENT
Start: 2023-05-01 | End: 2023-07-25

## 2023-05-01 RX ORDER — ACETAMINOPHEN 325 MG/1
650 TABLET ORAL
Status: DISCONTINUED | OUTPATIENT
Start: 2023-05-01 | End: 2023-05-01

## 2023-05-01 RX ORDER — LANOLIN ALCOHOL/MO/W.PET/CERES
1 CREAM (GRAM) TOPICAL DAILY
Status: DISCONTINUED | OUTPATIENT
Start: 2023-05-01 | End: 2023-05-02 | Stop reason: HOSPADM

## 2023-05-01 RX ORDER — OXYCODONE HYDROCHLORIDE 5 MG/1
5 TABLET ORAL EVERY 4 HOURS PRN
Status: DISCONTINUED | OUTPATIENT
Start: 2023-05-01 | End: 2023-05-02 | Stop reason: HOSPADM

## 2023-05-01 RX ORDER — IBUPROFEN 600 MG/1
600 TABLET ORAL 3 TIMES DAILY
Qty: 30 TABLET | Refills: 1 | Status: SHIPPED | OUTPATIENT
Start: 2023-05-01 | End: 2023-07-25

## 2023-05-01 RX ORDER — ACETAMINOPHEN 325 MG/1
650 TABLET ORAL EVERY 6 HOURS
Status: DISCONTINUED | OUTPATIENT
Start: 2023-05-01 | End: 2023-05-02 | Stop reason: HOSPADM

## 2023-05-01 RX ORDER — OXYCODONE HYDROCHLORIDE 5 MG/1
5 TABLET ORAL EVERY 4 HOURS PRN
Status: DISCONTINUED | OUTPATIENT
Start: 2023-05-01 | End: 2023-05-01

## 2023-05-01 RX ORDER — HYDROCODONE BITARTRATE AND ACETAMINOPHEN 5; 325 MG/1; MG/1
1 TABLET ORAL EVERY 6 HOURS PRN
Qty: 20 TABLET | Refills: 0 | Status: SHIPPED | OUTPATIENT
Start: 2023-05-01 | End: 2023-05-08 | Stop reason: SDUPTHER

## 2023-05-01 RX ADMIN — ENOXAPARIN SODIUM 40 MG: 40 INJECTION SUBCUTANEOUS at 06:05

## 2023-05-01 RX ADMIN — IBUPROFEN 600 MG: 600 TABLET, FILM COATED ORAL at 07:05

## 2023-05-01 RX ADMIN — IBUPROFEN 600 MG: 600 TABLET, FILM COATED ORAL at 06:05

## 2023-05-01 RX ADMIN — IBUPROFEN 600 MG: 600 TABLET, FILM COATED ORAL at 11:05

## 2023-05-01 RX ADMIN — FERROUS SULFATE TAB 325 MG (65 MG ELEMENTAL FE) 1 EACH: 325 (65 FE) TAB at 09:05

## 2023-05-01 RX ADMIN — DOCUSATE SODIUM 200 MG: 100 CAPSULE, LIQUID FILLED ORAL at 09:05

## 2023-05-01 RX ADMIN — IBUPROFEN 600 MG: 600 TABLET, FILM COATED ORAL at 12:05

## 2023-05-01 RX ADMIN — OXYCODONE HYDROCHLORIDE 10 MG: 5 TABLET ORAL at 12:05

## 2023-05-01 RX ADMIN — OXYCODONE HYDROCHLORIDE AND ACETAMINOPHEN 1 TABLET: 10; 325 TABLET ORAL at 01:05

## 2023-05-01 RX ADMIN — SIMETHICONE 80 MG: 80 TABLET, CHEWABLE ORAL at 07:05

## 2023-05-01 RX ADMIN — OXYCODONE HYDROCHLORIDE 10 MG: 5 TABLET ORAL at 07:05

## 2023-05-01 RX ADMIN — ESCITALOPRAM OXALATE 20 MG: 10 TABLET ORAL at 09:05

## 2023-05-01 RX ADMIN — OXYCODONE HYDROCHLORIDE AND ACETAMINOPHEN 1 TABLET: 10; 325 TABLET ORAL at 06:05

## 2023-05-01 RX ADMIN — ACETAMINOPHEN 650 MG: 325 TABLET, FILM COATED ORAL at 06:05

## 2023-05-01 RX ADMIN — ACETAMINOPHEN 650 MG: 325 TABLET, FILM COATED ORAL at 09:05

## 2023-05-01 RX ADMIN — PRENATAL VIT W/ FE FUMARATE-FA TAB 27-0.8 MG 1 TABLET: 27-0.8 TAB at 09:05

## 2023-05-01 RX ADMIN — SIMETHICONE 80 MG: 80 TABLET, CHEWABLE ORAL at 09:05

## 2023-05-01 RX ADMIN — OXYCODONE HYDROCHLORIDE 5 MG: 5 TABLET ORAL at 11:05

## 2023-05-01 NOTE — DISCHARGE INSTRUCTIONS
Formula Feeding Discharge Instructions    Formula feeding discharge instructions given with Non-Nursing Engorgement and Community Resources reviewed in Formula Feeding Guide.  Baby is to be fed by the Baby Led bottle feeding method:  Feed on Cue:  Hunger cues - hands to mouth, bending arms and legs toward the body, sucking noises, puckered lips and rooting/searching for the nipple  Method of feeding the baby:  always hold the baby upright, never prop a bottle  brush the nipple across babys upper lip and wait to open  hold bottle in a flat position, only partly full  allow baby to pause and take breaks; burp as needed  feeding lasts about 15 - 20 minutes  Stop feeding with signs of fullness  Fullness cues - sucking slows or stops, relaxed hands and arms, pushes away, falls asleep  Preparing Powdered Formula:  Remove plastic lid and wash lid with soap and water, dry and label with date  Clean top of can & open.  Remove scoop.  Follow s instructions on quantity of water and powder  Follow pediatricians recommendation on the type of water to use  Shake well prior to feeding  For pre-mixed formula - Refrigerate and use within 24 hours.  Re-warm individual bottles immediately prior to use.  Formula expires 1 hour after in initiation of the feeding  Preparing Liquid Concentrate Formula:  Follow pediatricians recommendation on the type of water to use  Add equal amounts of liquid concentrate and formula to the bottle  Shake well prior to feeding  For pre-mixed formula - Refrigerate and use within 24 hours.  Re-warm individual bottles immediately prior to use  For formula remaining in the can, cover and refrigerate until needed.  Use within 48 hours  Formula expires 1 hour after in initiation of the feeding    Preparing Ready to Feed Formula:  Shake container well prior to opening  Pour enough formula for 1 feeding into a clean bottle  Do not add water or any other liquid  Attach nipple and cap  Shake well  prior to feeding  Feed immediately  For pre-mixed formula - Refrigerate and use within 24 hours.  Re-warm individual bottles immediately prior to use  For formula remaining in the can, cover and refrigerate until needed.  Use within 48 hours  Formula expires 1 hour after in initiation of the feeding  Cleaning and sterilization of equipment for formula preparation:  Clean and disinfect working surface  Wash hands, arms and under fingernails with soap and water; dry using a clean cloth  Use bottle/nipple brush to wash all bottles, nipples, rings, caps and preparation utensils in hot soapy water before initial use and rinse  Sterilize all parts/utensils in boiling water or with a sterilization device prior to use  Continue to wash all parts with warm soapy water and rinse after each use and sterilize daily  Appropriate storage of formula if more than 1 bottle is prepared:  Put a clean nipple right side up on the bottle and cover with a nipple cap  Label each bottle with the date and time prepared  Refrigerate until feeding time  Warm immediately prior to use by a bottle warmer or by running under warm water  Do NOT microwave bottles  For formula remaining in the can, cover and refrigerate until needed.  Use within 48 hours  Formula expires 1 hour after in initiation of the feeding  Safe formula feeding, preparation and transporting of pre-mixed feedings:  Always use thoroughly cleaned and sterilized BPA free bottles  Formula & water preference to be determined by the advice of the pediatrician  Use proper hand washing  Follow all s guidelines for preparing formula  Check all expiration dates  Clean all can tops with soap and water prior to opening; also use a clean can opener  All mixed formula should be refrigerated until immediately prior to transport  Transport in a cool insulated bag with ice packs and use within 2 hours or re-refrigerate at arrival destination  Re-warm feeding at the destination for  no longer than 15 minutes      [facility]     Women, Infants, and Children Nutrition Program   Provides free breastfeeding education, counseling, food coupons, and breast pumps for eligible women. Breastfeeding counseling is provided by peer counselors and mother-to-mother support.      101.520.1909   [email or web address].gov    [facility] Connects moms, babies, and families in [place] to free help, pregnancy resources, and information about healthy behaviors pre- and . Available .  7-215-925-BABY   www.1135988dvqb.org   info@1683248wcss.org    [facility]:  Provides preconception, pregnancy, and post discharge support through nutrition services, primary medical care for children, and many other services. Available on the phone and one-to-one.  114.725.4580   www.dcsno.org    AAPCC (Poison Control)   The American Association of Poison Control Centers supports the [place] [address] poison centers in their efforts to prevent and treat poison exposures. Poison centers offer free, confidential, expert medical advice 24 hours a day, seven days a week.  1-158.951.7936   www.aapcc.org/

## 2023-05-01 NOTE — DISCHARGE SUMMARY
Delivery Discharge Summary  Obstetrics      Primary OB Clinician: Julie R. Jeansonne, MD     Admission date: 2023  Discharge date: 2023    Disposition: To home, self care    Discharge Diagnosis List:  Patient Active Problem List   Diagnosis    Chiari malformation type I    Syringomyelia    Attention deficit hyperactivity disorder (ADHD), combined type    Dysthymic disorder    Anxiety    Episodic paroxysmal hemicrania, not intractable    Shellfish allergy    AMA (advanced maternal age) multigravida 35+, second trimester    Mixed anxiety depressive disorder    Mental disorder affecting pregnancy in third trimester    Diet controlled gestational diabetes mellitus (GDM) in third trimester    Class 2 obesity due to excess calories without serious comorbidity with body mass index (BMI) of 37.0 to 37.9 in adult    Obesity affecting pregnancy in third trimester    Postpartum hemorrhage    Gestational hypertension, third trimester       Procedure: , due to maternal exhaustion       Hospital Course:  Can Wright is a 37 y.o. now , POD #3 who was admitted on 2023 at 38w5d for IOL 2/2 gHTN. IUP complicated by gHTN, AMA, GDMA1, mood disorder, chiari malformation, obesity Patient was subsequently admitted to labor and delivery unit with signed consents.     Labor course was complicated by maternal exhaustion and arrest of descent after 2 hours of pushing, and decision was made to proceed with delivery via  which was complicated by PPH.    Please see delivery note for further details. Her postpartum course was uncomplicated. On discharge day, patient's pain is controlled with oral pain medications. Pt is tolerating ambulation without SOB or CP, and regular diet without N/V. Reports lochia is mild. Denies any HA, vision changes, F/C, LE swelling. Denies any breast pain/soreness.    Pt in stable condition and ready for discharge. She has been instructed to start and/or continue  medications and follow up with her obstetrics provider as listed below.    Pertinent studies:  CBC  Recent Labs   Lab 23  1207 23  0552   WBC 11.50 14.13* 25.94*   HGB 12.3 12.2 8.8*   HCT 38.4 37.7 26.8*   MCV 84 83 84    304 260      Immunization History   Administered Date(s) Administered    COVID-19, MRNA, LN-S, PF (MODERNA FULL 0.5 ML DOSE) 2021, 2021    DTaP 2018    Influenza 10/24/2014    Meningococcal C Conjugate 2007    Meningococcal Conjugate (MCV4P) 2007    Tdap 2018, 2023        Delivery:    Episiotomy:     Lacerations:     Repair suture:     Repair # of packets:     Blood loss (ml):       Birth information:  YOB: 2024   Time of birth: 2:31 PM   Sex: female   Delivery type: , Low Transverse   Gestational Age: 38w5d    Delivery Clinician:      Other providers:       Additional  information:  Forceps:    Vacuum:    Breech:    Observed anomalies      Living?:           APGARS  One minute Five minutes Ten minutes   Skin color:         Heart rate:         Grimace:         Muscle tone:         Breathing:         Totals: 7  9        Placenta: Delivered:       appearance  Patient Instructions:   Current Discharge Medication List        START taking these medications    Details   docusate sodium (COLACE) 100 MG capsule Take 1 capsule (100 mg total) by mouth 2 (two) times daily.  Qty: 30 capsule, Refills: 1      HYDROcodone-acetaminophen (NORCO) 5-325 mg per tablet Take 1 tablet by mouth every 6 (six) hours as needed for Pain.  Qty: 20 tablet, Refills: 0    Comments: Quantity prescribed more than 7 day supply? No      ibuprofen (ADVIL,MOTRIN) 600 MG tablet Take 1 tablet (600 mg total) by mouth 3 (three) times daily.  Qty: 30 tablet, Refills: 1           CONTINUE these medications which have NOT CHANGED    Details   EScitalopram oxalate (LEXAPRO) 20 MG tablet TAKE 1 TABLET BY MOUTH EVERY DAY  Qty: 90 tablet, Refills:  1      ferrous sulfate 325 (65 FE) MG EC tablet Take 1 tablet (325 mg total) by mouth once daily.  Qty: 90 tablet, Refills: 1           STOP taking these medications       blood sugar diagnostic Strp Comments:   Reason for Stopping:         blood-glucose meter Misc Comments:   Reason for Stopping:         EPINEPHrine (EPIPEN) 0.3 mg/0.3 mL AtIn Comments:   Reason for Stopping:         fluticasone propionate (FLONASE) 50 mcg/actuation nasal spray Comments:   Reason for Stopping:         folic acid (FOLVITE) 1 MG tablet Comments:   Reason for Stopping:         gabapentin (NEURONTIN) 400 MG capsule Comments:   Reason for Stopping:         NATHAN FE 1/20, 28, 1 mg-20 mcg (21)/75 mg (7) per tablet Comments:   Reason for Stopping:         TRUEPLUS LANCETS 30 gauge Misc Comments:   Reason for Stopping:               Discharge Procedure Orders   Diet Adult Regular     Lifting restrictions   Order Comments: No lifting anything larger than baby for 6 weeks     No driving until:   Order Comments: No driving while taking narcotics     Pelvic Rest   Order Comments: Until seen in clinic     Notify your health care provider if you experience any of the following:  temperature >100.4     Notify your health care provider if you experience any of the following:  persistent nausea and vomiting or diarrhea     Notify your health care provider if you experience any of the following:  severe uncontrolled pain     Notify your health care provider if you experience any of the following:  redness, tenderness, or signs of infection (pain, swelling, redness, odor or green/yellow discharge around incision site)     Notify your health care provider if you experience any of the following:  difficulty breathing or increased cough     Notify your health care provider if you experience any of the following:  severe persistent headache     Notify your health care provider if you experience any of the following:  persistent dizziness,  light-headedness, or visual disturbances     Activity as tolerated        Follow-up Information       Julie R Jeansonne, MD Follow up in 1 week(s).    Specialty: Obstetrics and Gynecology  Why: Post partum blood pressure check  Contact information:  [address] [id number] [phone]                              Lelia Burgess MD PGY-1  Obstetrics and Gynecology  [facility]

## 2023-05-01 NOTE — PLAN OF CARE
"Plan of care:  Parents will hold baby skin-to-skin as much as possible; will feed baby on cue "8 or more in 24" but not longer than 3hrs between feedings; will achieve a deep latch and nurse  for 20-30min; will observe for signs of milk transfer; will then offer baby donor milk ad siomara by Paced bottle feeding; will double pump breasts "8 or more 24" using the Initiation pumping pattern c the most suction that is comfortable; will apply lanolin to nipples pc; will care for double collection kit as instructed; will call for assistance prn; will increase calories, fluids, and rest.  "

## 2023-05-01 NOTE — LACTATION NOTE
05/01/23 1406   Maternal Assessment   Breast Shape Bilateral:;pendulous   Breast Density Bilateral:;soft   Areola Bilateral:;dense   Nipples retracting;flat   Left Nipple Symptoms scabbed;tender   Right Nipple Symptoms scabbed;tender   Maternal Infant Feeding   Maternal Emotional State assist needed   Infant Positioning clutch/football   Signs of Milk Transfer audible swallow;infant jaw motion present   Pain with Feeding no   Comfort Measures Before/During Feeding infant position adjusted;maternal position adjusted   Nipple Shape After Feeding, Left long, round   Latch Assistance yes  (to achieve dddp latch c wide gape)   Breastfeeding Supplementation   Infant Indication for Supplementation   (hx of hypoglycemia)   Breastfeeding Supplementation Type donor breast milk   Method of Supplementation paced bottle   Equipment Type   Breast Pump Type double electric, hospital grade   Breast Pump Flange Type hard   Breast Pump Flange Size 21 mm   Breast Pumping   Breast Pumping Interventions post-feed pumping encouraged   Breast Pumping double electric breast pump utilized     Visited patient in room, grandmother holding fussy baby in arms. Assisted patient to comfortable position, moderate postioning and latch assistance provided, L breast, football position, deep latch achieved x2, stimulation required to keep baby actively sucking, occasional audible swallows noted.  Paced bottle feeding technique taught to patient and grandmother, grandmother able to return demonstrated correct technique. Assisted patient to double pump breasts using the Initiation pumping pattern c the most suction that is comfortable, changed to 21mm flanges and had patient apply lanolin to nipples and areolae prior to pumping. Noted very thin circular scabs on the areolae prior to breastfeeding and pumping.  Colostrum dampened flanges on both breasts.  Reviewed basic breastfeeding education, supply-demand principle, and use and care of the pump and  "double collection kit.  Patient stated, "This is harder than I thought".  Praise and encouragement provided.  "

## 2023-05-01 NOTE — PLAN OF CARE
VSS. Pain controlled with PRN oral pain medication. Patient feeding baby donor milk. Fundus firm and midline with lochia rubra. LTV dressing in place, clean/dry/intact. Voiding spontaneously with adequate output. Passing gas. No concerns at this time. Will continue to monitor the patient and intervene as necessary.        Problem:  Fall Injury Risk  Goal: Absence of Fall, Infant Drop and Related Injury  Outcome: Ongoing, Progressing     Problem: Adult Inpatient Plan of Care  Goal: Plan of Care Review  Outcome: Ongoing, Progressing  Goal: Patient-Specific Goal (Individualized)  Outcome: Ongoing, Progressing  Goal: Absence of Hospital-Acquired Illness or Injury  Outcome: Ongoing, Progressing  Goal: Optimal Comfort and Wellbeing  Outcome: Ongoing, Progressing  Goal: Readiness for Transition of Care  Outcome: Ongoing, Progressing     Problem: Infection  Goal: Absence of Infection Signs and Symptoms  Outcome: Ongoing, Progressing     Problem: Diabetes in Pregnancy  Goal: Blood Glucose Level Within Targeted Range  Outcome: Ongoing, Progressing     Problem: Fall Injury Risk  Goal: Absence of Fall and Fall-Related Injury  Outcome: Ongoing, Progressing     Problem: Pain Acute  Goal: Acceptable Pain Control and Functional Ability  Outcome: Ongoing, Progressing     Problem: Breastfeeding  Goal: Effective Breastfeeding  Outcome: Ongoing, Progressing     Problem: Adjustment to Role Transition (Postpartum  Delivery)  Goal: Successful Maternal Role Transition  Outcome: Ongoing, Progressing     Problem: Bleeding (Postpartum  Delivery)  Goal: Hemostasis  Outcome: Ongoing, Progressing     Problem: Infection (Postpartum  Delivery)  Goal: Absence of Infection Signs and Symptoms  Outcome: Ongoing, Progressing     Problem: Pain (Postpartum  Delivery)  Goal: Acceptable Pain Control  Outcome: Ongoing, Progressing     Problem: Postoperative Nausea and Vomiting (Postpartum  Delivery)  Goal:  Nausea and Vomiting Relief  Outcome: Ongoing, Progressing     Problem: Postoperative Urinary Retention (Postpartum  Delivery)  Goal: Effective Urinary Elimination  Outcome: Ongoing, Progressing

## 2023-05-01 NOTE — PLAN OF CARE
Patient safety maintained, side rails up, bed low and locked position. Pt ambulating and voiding independently.  Pain well controlled with PRN pain medication. Fundus midline, firm, with moderate  lochia. Patient responding to infant cues.  Incision site dressing clean, dry, and intact.  Significant other at bedside and assisting in patient's care. Will continue to monitor.      Problem: Diabetes in Pregnancy  Goal: Blood Glucose Level Within Targeted Range  Outcome: Met     Problem:  Fall Injury Risk  Goal: Absence of Fall, Infant Drop and Related Injury  Outcome: Ongoing, Progressing     Problem: Adult Inpatient Plan of Care  Goal: Plan of Care Review  Outcome: Ongoing, Progressing  Goal: Patient-Specific Goal (Individualized)  Outcome: Ongoing, Progressing  Goal: Absence of Hospital-Acquired Illness or Injury  Outcome: Ongoing, Progressing  Goal: Optimal Comfort and Wellbeing  Outcome: Ongoing, Progressing  Goal: Readiness for Transition of Care  Outcome: Ongoing, Progressing     Problem: Infection  Goal: Absence of Infection Signs and Symptoms  Outcome: Ongoing, Progressing     Problem: Fall Injury Risk  Goal: Absence of Fall and Fall-Related Injury  Outcome: Ongoing, Progressing     Problem: Pain Acute  Goal: Acceptable Pain Control and Functional Ability  Outcome: Ongoing, Progressing     Problem: Breastfeeding  Goal: Effective Breastfeeding  Outcome: Ongoing, Progressing     Problem: Adjustment to Role Transition (Postpartum  Delivery)  Goal: Successful Maternal Role Transition  Outcome: Ongoing, Progressing     Problem: Bleeding (Postpartum  Delivery)  Goal: Hemostasis  Outcome: Ongoing, Progressing     Problem: Infection (Postpartum  Delivery)  Goal: Absence of Infection Signs and Symptoms  Outcome: Ongoing, Progressing     Problem: Pain (Postpartum  Delivery)  Goal: Acceptable Pain Control  Outcome: Ongoing, Progressing     Problem: Postoperative Nausea and Vomiting  (Postpartum  Delivery)  Goal: Nausea and Vomiting Relief  Outcome: Ongoing, Progressing     Problem: Postoperative Urinary Retention (Postpartum  Delivery)  Goal: Effective Urinary Elimination  Outcome: Ongoing, Progressing

## 2023-05-01 NOTE — PROGRESS NOTES
POSTPARTUM PROGRESS NOTE    Subjective:     PPD/POD#: 2   Procedure: Primary LTCS (maternal exhaustion)   EGA: 38w5d   N/V: No   F/C: No   Abd Pain: Mild, well-controlled with oral pain medication   Lochia: Mild   Voiding: Yes   Ambulating: Yes   Bowel fnc: Yes   Breastfeeding: Yes   Contraception: Per primary OB   Circumcision: N/A, female     Objective:      Temp:  [96.6 °F (35.9 °C)-99.2 °F (37.3 °C)] 96.6 °F (35.9 °C)  Pulse:  [69-83] 80  Resp:  [16-19] 18  SpO2:  [96 %-98 %] 96 %  BP: (102-113)/(54-66) 112/65    Lung: Normal respiratory effort   Abdomen: Soft, appropriately tender   Uterus: Firm, no fundal tenderness   Incision: Bandage in place without shadowing   : Deferred   Extremities: Bilateral trace edema     Lab Review    Recent Labs   Lab 04/25/23 1207 04/27/23 2009   * 136   K 4.3 3.4*    108   CO2 20* 17*   BUN 9 11   CREATININE 0.8 0.8   GLU 69* 175*   PROT 6.8 6.8   BILITOT 0.3 0.2   ALKPHOS 144* 135   ALT 11 14   AST 18 15       Recent Labs   Lab 04/25/23 1207 04/27/23 2009 04/30/23  0552   WBC 11.50 14.13* 25.94*   HGB 12.3 12.2 8.8*   HCT 38.4 37.7 26.8*   MCV 84 83 84    304 260         I/O    Intake/Output Summary (Last 24 hours) at 5/1/2023 0637  Last data filed at 5/1/2023 0140  Gross per 24 hour   Intake 988.32 ml   Output 750 ml   Net 238.32 ml        Assessment and Plan:   Postpartum care:  - Patient doing well.  - Continue routine management and advances.  - Meets criteria for lovenox 40qd for PPX    gHTN  - BP as above  - asymptomatic  - preE labs as above  - UOP: 0.71 cc/kg/hr  - Mag: not indicated  - Hypertensive agent not indicated    Chorioamnionitis  - VS as above  - currently afebrile  - no fundal tenderness  - Ampicillin/Gentamicin: status post  - Clindamycin:status post    Anemia/PPH  - H/H as above  - QBL: 1355  - asymptomatic  - iron/colace     Mood Disorder  - Mood stable  - Medications: cont home lexapro  - Will need 1-2 week postpartum mood  check    A1 GDM  - Fasting BG on POD#1 83  - Will need 2 hour gtt at postpartum visit     Obesity  - Prepreg BMI <40  - encourage ambulation    Chiari 1 malformation and syringomyelia  - followed by neurosurg outpatient  - asymptomatic    Suzi Daniels MD  OB/GYN PGY-1

## 2023-05-01 NOTE — LACTATION NOTE
Safe formula feeding handout given and reviewed.  Discussed proper hand washing, expiration time of formula, position of baby, position of nipple and bottle while feeding, baby led feeding and fullness cues.  Pt verbalized understanding and verbalized appropriate recall.

## 2023-05-02 VITALS
DIASTOLIC BLOOD PRESSURE: 71 MMHG | BODY MASS INDEX: 36.76 KG/M2 | HEART RATE: 79 BPM | SYSTOLIC BLOOD PRESSURE: 120 MMHG | HEIGHT: 61 IN | OXYGEN SATURATION: 98 % | WEIGHT: 194.69 LBS | RESPIRATION RATE: 18 BRPM | TEMPERATURE: 98 F

## 2023-05-02 PROCEDURE — 25000003 PHARM REV CODE 250

## 2023-05-02 PROCEDURE — 99238 PR HOSPITAL DISCHARGE DAY,<30 MIN: ICD-10-PCS | Mod: ,,, | Performed by: OBSTETRICS & GYNECOLOGY

## 2023-05-02 PROCEDURE — 25000003 PHARM REV CODE 250: Performed by: STUDENT IN AN ORGANIZED HEALTH CARE EDUCATION/TRAINING PROGRAM

## 2023-05-02 PROCEDURE — 99238 HOSP IP/OBS DSCHRG MGMT 30/<: CPT | Mod: ,,, | Performed by: OBSTETRICS & GYNECOLOGY

## 2023-05-02 RX ADMIN — OXYCODONE HYDROCHLORIDE 10 MG: 5 TABLET ORAL at 03:05

## 2023-05-02 RX ADMIN — FERROUS SULFATE TAB 325 MG (65 MG ELEMENTAL FE) 1 EACH: 325 (65 FE) TAB at 08:05

## 2023-05-02 RX ADMIN — OXYCODONE HYDROCHLORIDE 10 MG: 5 TABLET ORAL at 08:05

## 2023-05-02 RX ADMIN — DOCUSATE SODIUM 200 MG: 100 CAPSULE, LIQUID FILLED ORAL at 08:05

## 2023-05-02 RX ADMIN — PRENATAL VIT W/ FE FUMARATE-FA TAB 27-0.8 MG 1 TABLET: 27-0.8 TAB at 08:05

## 2023-05-02 RX ADMIN — IBUPROFEN 600 MG: 600 TABLET, FILM COATED ORAL at 12:05

## 2023-05-02 RX ADMIN — IBUPROFEN 600 MG: 600 TABLET, FILM COATED ORAL at 05:05

## 2023-05-02 RX ADMIN — ACETAMINOPHEN 650 MG: 325 TABLET, FILM COATED ORAL at 08:05

## 2023-05-02 RX ADMIN — ACETAMINOPHEN 650 MG: 325 TABLET, FILM COATED ORAL at 03:05

## 2023-05-02 NOTE — PLAN OF CARE
Patient in no apparent distress. VSS. Ambulating without difficulty. No needs at this time. Discharge papers signed. Mother Baby care guide reviewed. All questions answered. Awaiting escort.     Problem:  Fall Injury Risk  Goal: Absence of Fall, Infant Drop and Related Injury  Outcome: Met     Problem: Adult Inpatient Plan of Care  Goal: Plan of Care Review  Outcome: Met  Goal: Patient-Specific Goal (Individualized)  Outcome: Met  Goal: Absence of Hospital-Acquired Illness or Injury  Outcome: Met  Goal: Optimal Comfort and Wellbeing  Outcome: Met  Goal: Readiness for Transition of Care  Outcome: Met     Problem: Infection  Goal: Absence of Infection Signs and Symptoms  Outcome: Met     Problem: Fall Injury Risk  Goal: Absence of Fall and Fall-Related Injury  Outcome: Met     Problem: Pain Acute  Goal: Acceptable Pain Control and Functional Ability  Outcome: Met     Problem: Breastfeeding  Goal: Effective Breastfeeding  Outcome: Met     Problem: Adjustment to Role Transition (Postpartum  Delivery)  Goal: Successful Maternal Role Transition  Outcome: Met     Problem: Bleeding (Postpartum  Delivery)  Goal: Hemostasis  Outcome: Met     Problem: Infection (Postpartum  Delivery)  Goal: Absence of Infection Signs and Symptoms  Outcome: Met     Problem: Pain (Postpartum  Delivery)  Goal: Acceptable Pain Control  Outcome: Met

## 2023-05-02 NOTE — PLAN OF CARE
VSS. Uterus firm w/o massage, bleeding continues to improve. The dressing over the Incision is intact with a scant amount of drainage. Pt has edema lower extremities bilaterally that got worse overnight, Dr. Will was at bedside to talk to patient and significant other. Pt ambulating independently, voiding spontaneously, passing flatus. Pain managed adequately w/medication. Plan of care reviewed w/pt and significant other. No new concerns expressed at this time. Pt would like to go home today. Will continue to monitor appropriately.

## 2023-05-02 NOTE — PROGRESS NOTES
POSTPARTUM PROGRESS NOTE    Subjective:     PPD/POD#: 3   Procedure: Primary LTCS (maternal exhaustion)   EGA: 38w5d   N/V: No   F/C: No   Abd Pain: Mild, well-controlled with oral pain medication   Lochia: Mild   Voiding: Yes   Ambulating: Yes   Bowel fnc: Yes   Breastfeeding: Yes   Contraception: Per primary OB   Circumcision: N/A, female     Objective:      Temp:  [97.5 °F (36.4 °C)-98.3 °F (36.8 °C)] 97.5 °F (36.4 °C)  Pulse:  [65-89] 65  Resp:  [16-20] 20  SpO2:  [97 %-100 %] 100 %  BP: (105-122)/(50-70) 122/70    Lung: Normal respiratory effort   Abdomen: Soft, appropriately tender   Uterus: Firm, no fundal tenderness   Incision: Bandage in place with some shadowing   : Deferred   Extremities: Bilateral non-pitting edema from knees down present. Symmetrical without erythema, warmth, or visible abnormalities.     Lab Review    Recent Labs   Lab 04/25/23  1207 04/27/23 2009   * 136   K 4.3 3.4*    108   CO2 20* 17*   BUN 9 11   CREATININE 0.8 0.8   GLU 69* 175*   PROT 6.8 6.8   BILITOT 0.3 0.2   ALKPHOS 144* 135   ALT 11 14   AST 18 15         Recent Labs   Lab 04/25/23  1207 04/27/23 2009 04/30/23  0552   WBC 11.50 14.13* 25.94*   HGB 12.3 12.2 8.8*   HCT 38.4 37.7 26.8*   MCV 84 83 84    304 260           I/O  No intake or output data in the 24 hours ending 05/02/23 0421       Assessment and Plan:   Postpartum care:  - Patient doing well.  - Continue routine management and advances.  - Meets criteria for lovenox 40qd for PPX    gHTN  - BP as above  - asymptomatic  - preE labs as above  - UOP: adequate  - Mag: not indicated  - Hypertensive agent not indicated    Lower extremity edema  -no pain or erythema  -bilateral with symmetry  -discussed with patient that following delivery there is increased perfusion and distribution of fluid throughout the body and swelling is normal after delivery due to the fluid shifts and physiologic changes. Pt expressed understanding.  -recommend more  ambulation, elevation of legs when seated, and staying hydrated.     Chorioamnionitis  - VS as above  - currently afebrile  - no fundal tenderness  - Ampicillin/Gentamicin: status post  - Clindamycin:status post    Anemia/PPH  - H/H as above  - QBL: 1355  - asymptomatic  - iron/colace     Mood Disorder  - Mood stable  - Medications: cont home lexapro  - Will need 1-2 week postpartum mood check    A1 GDM  - Fasting BG on POD#1 83  - Will need 2 hour gtt at postpartum visit     Obesity  - Prepreg BMI <40  - encourage ambulation    Chiari 1 malformation and syringomyelia  - followed by neurosurg outpatient  - asymptomatic    Paula Will MD PGY-1  Obstetrics and Gynecology

## 2023-05-02 NOTE — PROGRESS NOTES
HTN education given with handout, pt states understanding and will send BP through [facility] in 3-5 days.   GDM education given with handout, pt states understanding and will make a follow up glucose tolerance test in 6 weeks before her PP visit.

## 2023-05-04 ENCOUNTER — PATIENT MESSAGE (OUTPATIENT)
Dept: OBSTETRICS AND GYNECOLOGY | Facility: CLINIC | Age: 38
End: 2023-05-04
Payer: MEDICAID

## 2023-05-04 ENCOUNTER — PATIENT MESSAGE (OUTPATIENT)
Dept: PRIMARY CARE CLINIC | Facility: CLINIC | Age: 38
End: 2023-05-04
Payer: MEDICAID

## 2023-05-04 DIAGNOSIS — F90.0 ATTENTION DEFICIT HYPERACTIVITY DISORDER (ADHD), PREDOMINANTLY INATTENTIVE TYPE: ICD-10-CM

## 2023-05-04 NOTE — TELEPHONE ENCOUNTER
Pt would like to know when would it be safe to get refill and get back on her adhd meds? She has decided not to breastfeed.

## 2023-05-05 NOTE — TELEPHONE ENCOUNTER
Pt would like meds sent to [facility] and [facility]. Didn't pend anything. Not sure what you wanted to send.

## 2023-05-08 ENCOUNTER — PATIENT MESSAGE (OUTPATIENT)
Dept: PRIMARY CARE CLINIC | Facility: CLINIC | Age: 38
End: 2023-05-08
Payer: MEDICAID

## 2023-05-08 ENCOUNTER — PATIENT MESSAGE (OUTPATIENT)
Dept: OBSTETRICS AND GYNECOLOGY | Facility: CLINIC | Age: 38
End: 2023-05-08
Payer: MEDICAID

## 2023-05-08 DIAGNOSIS — Z98.890 POSTOPERATIVE STATE: Primary | ICD-10-CM

## 2023-05-08 RX ORDER — GABAPENTIN 400 MG/1
400 CAPSULE ORAL NIGHTLY
Qty: 90 CAPSULE | Refills: 3 | Status: SHIPPED | OUTPATIENT
Start: 2023-05-08 | End: 2023-06-04 | Stop reason: SDUPTHER

## 2023-05-08 RX ORDER — DEXTROAMPHETAMINE SACCHARATE, AMPHETAMINE ASPARTATE MONOHYDRATE, DEXTROAMPHETAMINE SULFATE AND AMPHETAMINE SULFATE 5; 5; 5; 5 MG/1; MG/1; MG/1; MG/1
20 CAPSULE, EXTENDED RELEASE ORAL EVERY MORNING
Qty: 30 CAPSULE | Refills: 0 | Status: SHIPPED | OUTPATIENT
Start: 2023-05-08 | End: 2023-06-06 | Stop reason: SDUPTHER

## 2023-05-08 RX ORDER — HYDROCODONE BITARTRATE AND ACETAMINOPHEN 5; 325 MG/1; MG/1
1 TABLET ORAL EVERY 6 HOURS PRN
Qty: 15 TABLET | Refills: 0 | Status: SHIPPED | OUTPATIENT
Start: 2023-05-08 | End: 2023-05-08

## 2023-05-08 NOTE — TELEPHONE ENCOUNTER
No care due was identified.  [facility] Embedded Care Due Messages. Reference number: [id number].   5/08/2023 10:41:13 AM CDT

## 2023-05-08 NOTE — TELEPHONE ENCOUNTER
Pt states she had 1 more refill on her gabapentin. It was discontinued when she was discharged from the hospital. She is requesting a refill.   no

## 2023-05-09 ENCOUNTER — PATIENT MESSAGE (OUTPATIENT)
Dept: OBSTETRICS AND GYNECOLOGY | Facility: CLINIC | Age: 38
End: 2023-05-09
Payer: MEDICAID

## 2023-05-09 RX ORDER — FERROUS SULFATE 325(65) MG
325 TABLET, DELAYED RELEASE (ENTERIC COATED) ORAL DAILY
Qty: 90 TABLET | Refills: 1 | Status: SHIPPED | OUTPATIENT
Start: 2023-05-09 | End: 2023-05-12

## 2023-05-10 ENCOUNTER — TELEPHONE (OUTPATIENT)
Dept: OBSTETRICS AND GYNECOLOGY | Facility: CLINIC | Age: 38
End: 2023-05-10
Payer: MEDICAID

## 2023-05-10 NOTE — TELEPHONE ENCOUNTER
Called pt in regards to scheduling a pp appt. Appt was made and pt vu.  ----- Message from Malu Benavides sent at 5/10/2023 11:34 AM CDT -----  Patient is trying to schedule her appointment with Dr. Jeansonne    for her postpartum visit. Patient would like for someone to give her   a call back in regards to this matter.  Contact# [phone]                           Thank You!!!!

## 2023-05-12 ENCOUNTER — PATIENT MESSAGE (OUTPATIENT)
Dept: OBSTETRICS AND GYNECOLOGY | Facility: CLINIC | Age: 38
End: 2023-05-12
Payer: MEDICAID

## 2023-05-12 RX ORDER — FERROUS SULFATE 325(65) MG
325 TABLET, DELAYED RELEASE (ENTERIC COATED) ORAL DAILY
Qty: 90 TABLET | Refills: 1 | Status: SHIPPED | OUTPATIENT
Start: 2023-05-12

## 2023-05-13 ENCOUNTER — PATIENT MESSAGE (OUTPATIENT)
Dept: OBSTETRICS AND GYNECOLOGY | Facility: CLINIC | Age: 38
End: 2023-05-13
Payer: MEDICAID

## 2023-05-15 DIAGNOSIS — Z98.890 POSTOPERATIVE STATE: Primary | ICD-10-CM

## 2023-05-15 RX ORDER — HYDROCODONE BITARTRATE AND ACETAMINOPHEN 5; 325 MG/1; MG/1
1 TABLET ORAL EVERY 6 HOURS PRN
Qty: 15 TABLET | Refills: 0 | Status: SHIPPED | OUTPATIENT
Start: 2023-05-15 | End: 2023-05-22 | Stop reason: SDUPTHER

## 2023-05-16 ENCOUNTER — PATIENT MESSAGE (OUTPATIENT)
Dept: PRIMARY CARE CLINIC | Facility: CLINIC | Age: 38
End: 2023-05-16
Payer: MEDICAID

## 2023-05-16 ENCOUNTER — PATIENT MESSAGE (OUTPATIENT)
Dept: OBSTETRICS AND GYNECOLOGY | Facility: CLINIC | Age: 38
End: 2023-05-16
Payer: MEDICAID

## 2023-05-16 RX ORDER — ONDANSETRON 4 MG/1
4 TABLET, ORALLY DISINTEGRATING ORAL EVERY 6 HOURS PRN
Qty: 30 TABLET | Refills: 1 | Status: SHIPPED | OUTPATIENT
Start: 2023-05-16 | End: 2023-07-13

## 2023-05-17 RX ORDER — NORTRIPTYLINE HYDROCHLORIDE 50 MG/1
50 CAPSULE ORAL NIGHTLY
Qty: 90 CAPSULE | Refills: 3 | Status: SHIPPED | OUTPATIENT
Start: 2023-05-17

## 2023-05-17 RX ORDER — NORTRIPTYLINE HYDROCHLORIDE 50 MG/1
50 CAPSULE ORAL NIGHTLY
Qty: 90 CAPSULE | Refills: 3 | Status: SHIPPED | OUTPATIENT
Start: 2023-05-17 | End: 2023-05-17 | Stop reason: SDUPTHER

## 2023-05-20 ENCOUNTER — PATIENT MESSAGE (OUTPATIENT)
Dept: OBSTETRICS AND GYNECOLOGY | Facility: CLINIC | Age: 38
End: 2023-05-20
Payer: MEDICAID

## 2023-05-21 ENCOUNTER — PATIENT MESSAGE (OUTPATIENT)
Dept: OBSTETRICS AND GYNECOLOGY | Facility: CLINIC | Age: 38
End: 2023-05-21
Payer: MEDICAID

## 2023-05-22 DIAGNOSIS — Z98.890 POSTOPERATIVE STATE: ICD-10-CM

## 2023-05-22 RX ORDER — HYDROCODONE BITARTRATE AND ACETAMINOPHEN 5; 325 MG/1; MG/1
1 TABLET ORAL EVERY 6 HOURS PRN
Qty: 15 TABLET | Refills: 0 | Status: SHIPPED | OUTPATIENT
Start: 2023-05-22 | End: 2023-07-25

## 2023-05-29 ENCOUNTER — POSTPARTUM VISIT (OUTPATIENT)
Dept: OBSTETRICS AND GYNECOLOGY | Facility: CLINIC | Age: 38
End: 2023-05-29
Payer: MEDICAID

## 2023-05-29 VITALS — HEIGHT: 61 IN | BODY MASS INDEX: 31.63 KG/M2 | WEIGHT: 167.56 LBS

## 2023-05-29 DIAGNOSIS — Z48.89 ENCOUNTER FOR POST SURGICAL WOUND CHECK: ICD-10-CM

## 2023-05-29 PROCEDURE — 99999 PR PBB SHADOW E&M-EST. PATIENT-LVL III: ICD-10-PCS | Mod: PBBFAC,,, | Performed by: OBSTETRICS & GYNECOLOGY

## 2023-05-29 PROCEDURE — 59430 PR CARE AFTER DELIVERY ONLY: ICD-10-PCS | Mod: ,,, | Performed by: OBSTETRICS & GYNECOLOGY

## 2023-05-29 PROCEDURE — 99999 PR PBB SHADOW E&M-EST. PATIENT-LVL III: CPT | Mod: PBBFAC,,, | Performed by: OBSTETRICS & GYNECOLOGY

## 2023-05-29 PROCEDURE — 99213 OFFICE O/P EST LOW 20 MIN: CPT | Mod: PBBFAC | Performed by: OBSTETRICS & GYNECOLOGY

## 2023-05-30 NOTE — PROGRESS NOTES
Can Wright is a 37 y.o. female  presents for a postpartum visit.  She is status post PCS 4 weeks ago.  Her hospitalization was complicated by GHTN, GDM.  She is not breastfeeding.  She desires oral contraceptives (estrogen/progesterone) for contraception - already taking.  She denies postpartum depression. EPDS score is 5. She ha snot resumed menses since delivery. She has not resumed intercourse since delivery. Still having some back pain since delivery.    Her last pap was 2020neg, HPV neg.     ROS: per HPI    Past Medical History:   Diagnosis Date    ADD (attention deficit disorder)     Allergy     Anxiety     nortriptyline 50 mg nightly, Lexapro 20 q.d., propranolol 10 mg prn    Attention deficit hyperactivity disorder (ADHD), combined type 2014    Carpal tunnel syndrome on both sides     Neurontin 400 mg 3 times a day for carpal tunnel & arthritis. Bilateral injections Dr Talbert, Celebrex expensive    Chiari I malformation     Dysthymic disorder     lexapro    Episodic paroxysmal hemicrania, not intractable 2022    Hyperlipidemia     Shellfish allergy     epi pen    Syringomyelia     asymptomatic, released from Dr Lyon, steroids worked initially, tx Celebrex, Neurontin, Tyl #3 (helps prn), pamelor, Dr Gracia recommended surgery     Past Surgical History:   Procedure Laterality Date     SECTION N/A 2023    Procedure:  SECTION;  Surgeon: Sohan Graf MD;  [facility];  Service: OB/GYN;  Laterality: N/A;    WISDOM TOOTH EXTRACTION       Review of patient's allergies indicates:   Allergen Reactions    Wellbutrin [bupropion hcl] Anxiety     Worsened anxiety    Amoxicillin trihydrate      Other reaction(s): severe stomach pain    Augmentin [amoxicillin-pot clavulanate] Nausea Only    Banana extract      Other reaction(s): swelling, itchy lips    Cat hair standardized allergenic extract      Other reaction(s): itchy eyes    Corn syrup      Other  reaction(s): Unknown    Pollen extracts      Other reaction(s): sneezing, stuffy nose    Potassium clavulanate      Other reaction(s): severe stomach pain    Shellfish derived      Other reaction(s): Anaphylaxis    Conover      Other reaction(s): itchy throat       Current Outpatient Medications:     dextroamphetamine-amphetamine (ADDERALL XR) 20 MG 24 hr capsule, Take 1 capsule (20 mg total) by mouth every morning., Disp: 30 capsule, Rfl: 0    EScitalopram oxalate (LEXAPRO) 20 MG tablet, TAKE 1 TABLET BY MOUTH EVERY DAY, Disp: 90 tablet, Rfl: 1    ferrous sulfate 325 (65 FE) MG EC tablet, Take 1 tablet (325 mg total) by mouth once daily., Disp: 90 tablet, Rfl: 1    gabapentin (NEURONTIN) 400 MG capsule, Take 1 capsule (400 mg total) by mouth every evening., Disp: 90 capsule, Rfl: 3    HYDROcodone-acetaminophen (NORCO) 5-325 mg per tablet, Take 1 tablet by mouth every 6 (six) hours as needed for Pain., Disp: 15 tablet, Rfl: 0    ibuprofen (ADVIL,MOTRIN) 600 MG tablet, Take 1 tablet (600 mg total) by mouth 3 (three) times daily., Disp: 30 tablet, Rfl: 1    nortriptyline (PAMELOR) 50 MG capsule, Take 1 capsule (50 mg total) by mouth every evening., Disp: 90 capsule, Rfl: 3    ondansetron (ZOFRAN-ODT) 4 MG TbDL, Take 1 tablet (4 mg total) by mouth every 6 (six) hours as needed (nausea)., Disp: 30 tablet, Rfl: 1    docusate sodium (COLACE) 100 MG capsule, Take 1 capsule (100 mg total) by mouth 2 (two) times daily. (Patient not taking: Reported on 5/29/2023), Disp: 30 capsule, Rfl: 1      There were no vitals filed for this visit.    GENERAL: healthy, alert, no distress, cooperative, smiling  RESP: nl effort  ABDOMEN: Normal, benign. and no masses, hepatosplenomegaly, no hernias. Incision healing well.   PSYCH: nl affect    Assessment:  Normal postpartum exam    -advised about recommendation to wait 18 months between delivery and conceiving another pregnancy if <35 and 12 months if greater than 35 years old.  -Pt  cleared to return to normal activity, bathing, intercourse.   -counseled on birth control options.   -Pt requesting more norco refils. Advised to take ibuprofen for back pain.   -initial BP elevated, recheck improved. Follow up PCP for this.  -Will need 2 hour GDM screen in the future.     Plan:  Routine follow up 3 months for annual exam.

## 2023-06-01 ENCOUNTER — PATIENT MESSAGE (OUTPATIENT)
Dept: OBSTETRICS AND GYNECOLOGY | Facility: CLINIC | Age: 38
End: 2023-06-01
Payer: MEDICAID

## 2023-06-02 DIAGNOSIS — F41.8 POSTPARTUM ANXIETY: Primary | ICD-10-CM

## 2023-06-04 ENCOUNTER — PATIENT MESSAGE (OUTPATIENT)
Dept: PRIMARY CARE CLINIC | Facility: CLINIC | Age: 38
End: 2023-06-04
Payer: MEDICAID

## 2023-06-04 NOTE — TELEPHONE ENCOUNTER
No care due was identified.  Health [facility] Embedded Care Due Messages. Reference number: [id number].   6/04/2023 12:59:27 PM CDT

## 2023-06-05 RX ORDER — GABAPENTIN 400 MG/1
400 CAPSULE ORAL NIGHTLY
Qty: 90 CAPSULE | Refills: 3 | OUTPATIENT
Start: 2023-06-05 | End: 2024-05-30

## 2023-06-05 RX ORDER — GABAPENTIN 400 MG/1
400 CAPSULE ORAL 3 TIMES DAILY
Qty: 90 CAPSULE | Refills: 0 | Status: SHIPPED | OUTPATIENT
Start: 2023-06-05 | End: 2023-06-30 | Stop reason: SDUPTHER

## 2023-06-05 NOTE — TELEPHONE ENCOUNTER
No care due was identified.  [facility] Embedded Care Due Messages. Reference number: [id number].   6/05/2023 8:45:37 AM CDT

## 2023-06-06 DIAGNOSIS — F90.0 ATTENTION DEFICIT HYPERACTIVITY DISORDER (ADHD), PREDOMINANTLY INATTENTIVE TYPE: ICD-10-CM

## 2023-06-06 NOTE — TELEPHONE ENCOUNTER
No care due was identified.  [facility] Embedded Care Due Messages. Reference number: [id number].   6/06/2023 11:30:55 AM CDT

## 2023-06-07 RX ORDER — DEXTROAMPHETAMINE SACCHARATE, AMPHETAMINE ASPARTATE MONOHYDRATE, DEXTROAMPHETAMINE SULFATE AND AMPHETAMINE SULFATE 5; 5; 5; 5 MG/1; MG/1; MG/1; MG/1
20 CAPSULE, EXTENDED RELEASE ORAL EVERY MORNING
Qty: 30 CAPSULE | Refills: 0 | Status: SHIPPED | OUTPATIENT
Start: 2023-06-07 | End: 2023-06-30 | Stop reason: SDUPTHER

## 2023-06-19 ENCOUNTER — TELEPHONE (OUTPATIENT)
Dept: PRIMARY CARE CLINIC | Facility: CLINIC | Age: 38
End: 2023-06-19
Payer: MEDICAID

## 2023-06-19 ENCOUNTER — PATIENT MESSAGE (OUTPATIENT)
Dept: PRIMARY CARE CLINIC | Facility: CLINIC | Age: 38
End: 2023-06-19
Payer: MEDICAID

## 2023-06-20 ENCOUNTER — TELEPHONE (OUTPATIENT)
Dept: PRIMARY CARE CLINIC | Facility: CLINIC | Age: 38
End: 2023-06-20
Payer: MEDICAID

## 2023-06-20 NOTE — TELEPHONE ENCOUNTER
LVM trying to see if pt would be available for a virtual visit tomorrow at 8:20 with Dr Witt to discuss quitting smoking.

## 2023-06-30 DIAGNOSIS — F90.0 ATTENTION DEFICIT HYPERACTIVITY DISORDER (ADHD), PREDOMINANTLY INATTENTIVE TYPE: ICD-10-CM

## 2023-07-03 RX ORDER — DEXTROAMPHETAMINE SACCHARATE, AMPHETAMINE ASPARTATE MONOHYDRATE, DEXTROAMPHETAMINE SULFATE AND AMPHETAMINE SULFATE 5; 5; 5; 5 MG/1; MG/1; MG/1; MG/1
20 CAPSULE, EXTENDED RELEASE ORAL EVERY MORNING
Qty: 30 CAPSULE | Refills: 0 | Status: SHIPPED | OUTPATIENT
Start: 2023-07-03 | End: 2023-07-29 | Stop reason: SDUPTHER

## 2023-07-03 RX ORDER — GABAPENTIN 400 MG/1
400 CAPSULE ORAL 3 TIMES DAILY
Qty: 90 CAPSULE | Refills: 0 | Status: SHIPPED | OUTPATIENT
Start: 2023-07-03 | End: 2023-07-29 | Stop reason: SDUPTHER

## 2023-07-13 RX ORDER — ONDANSETRON 4 MG/1
TABLET, ORALLY DISINTEGRATING ORAL
Qty: 30 TABLET | Refills: 1 | Status: SHIPPED | OUTPATIENT
Start: 2023-07-13

## 2023-07-13 RX ORDER — ESCITALOPRAM OXALATE 20 MG/1
TABLET ORAL
Qty: 90 TABLET | Refills: 0 | Status: SHIPPED | OUTPATIENT
Start: 2023-07-13 | End: 2023-10-11

## 2023-07-13 NOTE — TELEPHONE ENCOUNTER
No care due was identified.  [facility] Embedded Care Due Messages. Reference number: [id number].   7/13/2023 12:59:20 AM CDT

## 2023-07-25 ENCOUNTER — OFFICE VISIT (OUTPATIENT)
Dept: PRIMARY CARE CLINIC | Facility: CLINIC | Age: 38
End: 2023-07-25
Payer: MEDICAID

## 2023-07-25 ENCOUNTER — LAB VISIT (OUTPATIENT)
Dept: LAB | Facility: HOSPITAL | Age: 38
End: 2023-07-25
Attending: FAMILY MEDICINE
Payer: MEDICAID

## 2023-07-25 VITALS
OXYGEN SATURATION: 99 % | HEART RATE: 115 BPM | WEIGHT: 171.94 LBS | SYSTOLIC BLOOD PRESSURE: 124 MMHG | BODY MASS INDEX: 32.46 KG/M2 | DIASTOLIC BLOOD PRESSURE: 79 MMHG | TEMPERATURE: 99 F | HEIGHT: 61 IN

## 2023-07-25 DIAGNOSIS — R73.09 ELEVATED GLUCOSE: ICD-10-CM

## 2023-07-25 DIAGNOSIS — G56.03 CARPAL TUNNEL SYNDROME ON BOTH SIDES: ICD-10-CM

## 2023-07-25 DIAGNOSIS — Z00.00 ROUTINE GENERAL MEDICAL EXAMINATION AT A HEALTH CARE FACILITY: ICD-10-CM

## 2023-07-25 DIAGNOSIS — Z00.00 ROUTINE GENERAL MEDICAL EXAMINATION AT A HEALTH CARE FACILITY: Primary | ICD-10-CM

## 2023-07-25 DIAGNOSIS — F41.9 ANXIETY: ICD-10-CM

## 2023-07-25 DIAGNOSIS — F90.2 ATTENTION DEFICIT HYPERACTIVITY DISORDER (ADHD), COMBINED TYPE: ICD-10-CM

## 2023-07-25 PROBLEM — O09.522 AMA (ADVANCED MATERNAL AGE) MULTIGRAVIDA 35+, SECOND TRIMESTER: Status: RESOLVED | Noted: 2022-12-20 | Resolved: 2023-07-25

## 2023-07-25 LAB
ALBUMIN SERPL BCP-MCNC: 3.5 G/DL (ref 3.5–5.2)
ALP SERPL-CCNC: 91 U/L (ref 55–135)
ALT SERPL W/O P-5'-P-CCNC: 16 U/L (ref 10–44)
ANION GAP SERPL CALC-SCNC: 11 MMOL/L (ref 8–16)
AST SERPL-CCNC: 18 U/L (ref 10–40)
BASOPHILS # BLD AUTO: 0.05 K/UL (ref 0–0.2)
BASOPHILS NFR BLD: 0.5 % (ref 0–1.9)
BILIRUB SERPL-MCNC: 0.2 MG/DL (ref 0.1–1)
BUN SERPL-MCNC: 11 MG/DL (ref 6–20)
CALCIUM SERPL-MCNC: 9.7 MG/DL (ref 8.7–10.5)
CHLORIDE SERPL-SCNC: 107 MMOL/L (ref 95–110)
CHOLEST SERPL-MCNC: 242 MG/DL (ref 120–199)
CHOLEST/HDLC SERPL: 4.8 {RATIO} (ref 2–5)
CO2 SERPL-SCNC: 24 MMOL/L (ref 23–29)
CREAT SERPL-MCNC: 0.8 MG/DL (ref 0.5–1.4)
DIFFERENTIAL METHOD: ABNORMAL
EOSINOPHIL # BLD AUTO: 0.1 K/UL (ref 0–0.5)
EOSINOPHIL NFR BLD: 0.8 % (ref 0–8)
ERYTHROCYTE [DISTWIDTH] IN BLOOD BY AUTOMATED COUNT: 13.7 % (ref 11.5–14.5)
EST. GFR  (NO RACE VARIABLE): >60 ML/MIN/1.73 M^2
ESTIMATED AVG GLUCOSE: 97 MG/DL (ref 68–131)
GLUCOSE SERPL-MCNC: 94 MG/DL (ref 70–110)
HBA1C MFR BLD: 5 % (ref 4–5.6)
HCT VFR BLD AUTO: 43.6 % (ref 37–48.5)
HDLC SERPL-MCNC: 50 MG/DL (ref 40–75)
HDLC SERPL: 20.7 % (ref 20–50)
HGB BLD-MCNC: 13.2 G/DL (ref 12–16)
IMM GRANULOCYTES # BLD AUTO: 0.03 K/UL (ref 0–0.04)
IMM GRANULOCYTES NFR BLD AUTO: 0.3 % (ref 0–0.5)
LDLC SERPL CALC-MCNC: 145.8 MG/DL (ref 63–159)
LYMPHOCYTES # BLD AUTO: 2.6 K/UL (ref 1–4.8)
LYMPHOCYTES NFR BLD: 28.1 % (ref 18–48)
MCH RBC QN AUTO: 26.1 PG (ref 27–31)
MCHC RBC AUTO-ENTMCNC: 30.3 G/DL (ref 32–36)
MCV RBC AUTO: 86 FL (ref 82–98)
MONOCYTES # BLD AUTO: 0.4 K/UL (ref 0.3–1)
MONOCYTES NFR BLD: 4 % (ref 4–15)
NEUTROPHILS # BLD AUTO: 6.1 K/UL (ref 1.8–7.7)
NEUTROPHILS NFR BLD: 66.3 % (ref 38–73)
NONHDLC SERPL-MCNC: 192 MG/DL
NRBC BLD-RTO: 0 /100 WBC
PLATELET # BLD AUTO: 530 K/UL (ref 150–450)
PMV BLD AUTO: 10.2 FL (ref 9.2–12.9)
POTASSIUM SERPL-SCNC: 4.2 MMOL/L (ref 3.5–5.1)
PROT SERPL-MCNC: 7.6 G/DL (ref 6–8.4)
RBC # BLD AUTO: 5.05 M/UL (ref 4–5.4)
SODIUM SERPL-SCNC: 142 MMOL/L (ref 136–145)
TRIGL SERPL-MCNC: 231 MG/DL (ref 30–150)
WBC # BLD AUTO: 9.21 K/UL (ref 3.9–12.7)

## 2023-07-25 PROCEDURE — 3008F PR BODY MASS INDEX (BMI) DOCUMENTED: ICD-10-PCS | Mod: CPTII,,, | Performed by: FAMILY MEDICINE

## 2023-07-25 PROCEDURE — 83036 HEMOGLOBIN GLYCOSYLATED A1C: CPT | Performed by: FAMILY MEDICINE

## 2023-07-25 PROCEDURE — 99395 PR PREVENTIVE VISIT,EST,18-39: ICD-10-PCS | Mod: S$PBB,,, | Performed by: FAMILY MEDICINE

## 2023-07-25 PROCEDURE — 99395 PREV VISIT EST AGE 18-39: CPT | Mod: S$PBB,,, | Performed by: FAMILY MEDICINE

## 2023-07-25 PROCEDURE — 85025 COMPLETE CBC W/AUTO DIFF WBC: CPT | Performed by: FAMILY MEDICINE

## 2023-07-25 PROCEDURE — 80053 COMPREHEN METABOLIC PANEL: CPT | Performed by: FAMILY MEDICINE

## 2023-07-25 PROCEDURE — 99214 OFFICE O/P EST MOD 30 MIN: CPT | Mod: PBBFAC,PN | Performed by: FAMILY MEDICINE

## 2023-07-25 PROCEDURE — 3078F DIAST BP <80 MM HG: CPT | Mod: CPTII,,, | Performed by: FAMILY MEDICINE

## 2023-07-25 PROCEDURE — 3074F PR MOST RECENT SYSTOLIC BLOOD PRESSURE < 130 MM HG: ICD-10-PCS | Mod: CPTII,,, | Performed by: FAMILY MEDICINE

## 2023-07-25 PROCEDURE — 3074F SYST BP LT 130 MM HG: CPT | Mod: CPTII,,, | Performed by: FAMILY MEDICINE

## 2023-07-25 PROCEDURE — 1159F PR MEDICATION LIST DOCUMENTED IN MEDICAL RECORD: ICD-10-PCS | Mod: CPTII,,, | Performed by: FAMILY MEDICINE

## 2023-07-25 PROCEDURE — 1159F MED LIST DOCD IN RCRD: CPT | Mod: CPTII,,, | Performed by: FAMILY MEDICINE

## 2023-07-25 PROCEDURE — 3008F BODY MASS INDEX DOCD: CPT | Mod: CPTII,,, | Performed by: FAMILY MEDICINE

## 2023-07-25 PROCEDURE — 80061 LIPID PANEL: CPT | Performed by: FAMILY MEDICINE

## 2023-07-25 PROCEDURE — 1160F RVW MEDS BY RX/DR IN RCRD: CPT | Mod: CPTII,,, | Performed by: FAMILY MEDICINE

## 2023-07-25 PROCEDURE — 1160F PR REVIEW ALL MEDS BY PRESCRIBER/CLIN PHARMACIST DOCUMENTED: ICD-10-PCS | Mod: CPTII,,, | Performed by: FAMILY MEDICINE

## 2023-07-25 PROCEDURE — 99999 PR PBB SHADOW E&M-EST. PATIENT-LVL IV: CPT | Mod: PBBFAC,,, | Performed by: FAMILY MEDICINE

## 2023-07-25 PROCEDURE — 99999 PR PBB SHADOW E&M-EST. PATIENT-LVL IV: ICD-10-PCS | Mod: PBBFAC,,, | Performed by: FAMILY MEDICINE

## 2023-07-25 PROCEDURE — 36415 COLL VENOUS BLD VENIPUNCTURE: CPT | Mod: PN | Performed by: FAMILY MEDICINE

## 2023-07-25 PROCEDURE — 3078F PR MOST RECENT DIASTOLIC BLOOD PRESSURE < 80 MM HG: ICD-10-PCS | Mod: CPTII,,, | Performed by: FAMILY MEDICINE

## 2023-07-25 RX ORDER — NORETHINDRONE ACETATE AND ETHINYL ESTRADIOL AND FERROUS FUMARATE 1MG-20(21)
1 KIT ORAL
COMMUNITY
Start: 2023-06-08

## 2023-07-25 NOTE — ASSESSMENT & PLAN NOTE
Stable, continue medicine.  Virtual visit every 6 months for refills.   Let me know if you have side effects -  palpitations, weight loss, chest pain, elevated blood pressure.  If you are female & are contemplating pregnancy or become pregnant, STOP this medication.

## 2023-07-25 NOTE — PROGRESS NOTES
Assessment:     1. Routine general medical examination at a health care facility    2. Attention deficit hyperactivity disorder (ADHD), combined type    3. Carpal tunnel syndrome on both sides    4. Anxiety      Plan:     1. Routine general medical examination at a health care facility  Assessment & Plan:  Follow with GYN for female health & cancer prevention  Move more, low fat, high fiber foods  Eat more food grown from the earth (picked from trees or out of the ground)  Follow up yearly with LABS ONE WEEK PRIOR so we can discuss at your visit      Orders:  -     CBC Auto Differential; Future; Expected date: 07/25/2023  -     Comprehensive Metabolic Panel; Future; Expected date: 07/25/2023  -     Lipid Panel; Future; Expected date: 07/25/2023    2. Attention deficit hyperactivity disorder (ADHD), combined type  Assessment & Plan:  Stable, continue medicine.  Virtual visit every 6 months for refills.   Let me know if you have side effects -  palpitations, weight loss, chest pain, elevated blood pressure.  If you are female & are contemplating pregnancy or become pregnant, STOP this medication.         3. Carpal tunnel syndrome on both sides  Assessment & Plan:  Neurontin 400 mg 3 times a day for carpal tunnel & arthritis      4. Anxiety  Assessment & Plan:  Stable, continue medicines, I can refill Nortriptyline 50 mg qhs, Lexapro 20 daily          CHIEF COMPLAINT: General exam    HPI: Can Wright is a 37 y.o. female with is here today for general exam.     Daughter b orn May 2023. Induced due to high BP at 38 weeks. Never took med during pregnancy for HTN. Restarted OCP & Adderall.     Discussion concerning ADD medication Adderall XR 20 qd  It works well for focus, concentration, completing tasks.  Denies any side effects of palpitations, chest pain, shortness of breath, weight loss, decreased appetite, sweating    Denies chest pain, shortness of breath    Current Outpatient Medications   Medication  Instructions    dextroamphetamine-amphetamine (ADDERALL XR) 20 MG 24 hr capsule 20 mg, Oral, Every morning    EScitalopram oxalate (LEXAPRO) 20 MG tablet TAKE 1 TABLET BY MOUTH EVERY DAY    ferrous sulfate 325 mg, Oral, Daily    gabapentin (NEURONTIN) 400 mg, Oral, 3 times daily    JUNEL FE 1/20, 28, 1 mg-20 mcg (21)/75 mg (7) per tablet 1 tablet, Oral    nortriptyline (PAMELOR) 50 mg, Oral, Nightly    ondansetron (ZOFRAN-ODT) 4 MG TbDL DISSOLVE 1 TABLET(4 MG) ON THE TONGUE EVERY 6 HOURS AS NEEDED FOR NAUSEA       No results found for: HGBA1C  No results found for: MICALBCREAT  Lab Results   Component Value Date    LDLCALC 121.0 12/13/2021    LDLCALC 133.4 08/14/2020    CHOL 226 (H) 12/13/2021    HDL 47 12/13/2021    TRIG 290 (H) 12/13/2021       Lab Results   Component Value Date     04/27/2023    K 3.4 (L) 04/27/2023     04/27/2023    CO2 17 (L) 04/27/2023     (H) 04/27/2023    BUN 11 04/27/2023    CREATININE 0.8 04/27/2023    CALCIUM 9.4 04/27/2023    PROT 6.8 04/27/2023    ALBUMIN 2.5 (L) 04/27/2023    BILITOT 0.2 04/27/2023    ALKPHOS 135 04/27/2023    AST 15 04/27/2023    ALT 14 04/27/2023    ANIONGAP 11 04/27/2023    ESTGFRAFRICA >60.0 12/13/2021    EGFRNONAA >60.0 12/13/2021    WBC 25.94 (H) 04/30/2023    HGB 8.8 (L) 04/30/2023    HGB 12.2 04/27/2023    HCT 26.8 (L) 04/30/2023    MCV 84 04/30/2023     04/30/2023    TSH 2.430 12/13/2021    HEPCAB Non-reactive 10/18/2022       No results found for: LH, FSH, TOTALTESTOST, PROGESTERONE, ESTRADIOL, ZKOLCNUJ14LZ, UGHNQIAI26, FERRITIN, IRON, TRANSFERRIN, TIBC, FESATURATED, ZINC      Past Medical History:   Diagnosis Date    ADD (attention deficit disorder)     Allergy     Anxiety     nortriptyline 50 mg nightly, Lexapro 20 q.d., propranolol 10 mg prn    Attention deficit hyperactivity disorder (ADHD), combined type 04/24/2014    Carpal tunnel syndrome on both sides 2012    Neurontin 400 mg 3 times a day for carpal tunnel & arthritis.  "Bilateral injections Dr Talbert, Celebrex expensive    Chiari I malformation     Dysthymic disorder     lexapro    Episodic paroxysmal hemicrania, not intractable 2022    Hyperlipidemia     Shellfish allergy     epi pen    Syringomyelia     asymptomatic, released from Dr Lyon, steroids worked initially, tx Celebrex, Neurontin, Tyl #3 (helps prn), Dr Samia braswell recommended surgery     Past Surgical History:   Procedure Laterality Date     SECTION N/A 2023    Procedure:  SECTION;  Surgeon: Sohan Graf MD;  Location: [facility] L&D;  Service: OB/GYN;  Laterality: N/A;    WISDOM TOOTH EXTRACTION       Vitals:    23 1010 23 1050   BP: (!) 140/94 124/79   Pulse: (!) 115    Temp: 99.1 °F (37.3 °C)    TempSrc: Oral    SpO2: 99%    Weight: 78 kg (171 lb 15.3 oz)    Height: 5' 1" (1.549 m)    PainSc: 0-No pain      Objective:   Physical Exam                                "

## 2023-07-26 NOTE — PROGRESS NOTES
CHOLESTEROL is elevated    Your tests are NORMAL -  Sugar (Glucose) & Hga1c  Kidney (Creatinine)  and liver (AST, ALT) filters   Hemoglobin (carries oxygen) - you are not anemic    Recheck labs PRIOR to your visit with me  -- so we can discuss results face to face

## 2023-07-27 NOTE — ADDENDUM NOTE
Addendum  created 07/27/23 1517 by Diana Arthur MD    Attestation recorded in Intraprocedure, Intraprocedure Attestations filed

## 2023-07-29 DIAGNOSIS — F90.0 ATTENTION DEFICIT HYPERACTIVITY DISORDER (ADHD), PREDOMINANTLY INATTENTIVE TYPE: ICD-10-CM

## 2023-07-29 NOTE — TELEPHONE ENCOUNTER
No care due was identified.  Health [facility] Embedded Care Due Messages. Reference number: [id number].   7/29/2023 9:50:38 AM CDT

## 2023-07-31 PROBLEM — O13.3 GESTATIONAL HYPERTENSION, THIRD TRIMESTER: Status: RESOLVED | Noted: 2023-04-29 | Resolved: 2023-07-31

## 2023-07-31 RX ORDER — DEXTROAMPHETAMINE SACCHARATE, AMPHETAMINE ASPARTATE MONOHYDRATE, DEXTROAMPHETAMINE SULFATE AND AMPHETAMINE SULFATE 5; 5; 5; 5 MG/1; MG/1; MG/1; MG/1
20 CAPSULE, EXTENDED RELEASE ORAL EVERY MORNING
Qty: 30 CAPSULE | Refills: 0 | Status: SHIPPED | OUTPATIENT
Start: 2023-07-31 | End: 2023-08-04 | Stop reason: SDUPTHER

## 2023-07-31 RX ORDER — GABAPENTIN 400 MG/1
400 CAPSULE ORAL 3 TIMES DAILY
Qty: 90 CAPSULE | Refills: 0 | Status: SHIPPED | OUTPATIENT
Start: 2023-07-31 | End: 2023-08-27 | Stop reason: SDUPTHER

## 2023-08-04 ENCOUNTER — PATIENT MESSAGE (OUTPATIENT)
Dept: PRIMARY CARE CLINIC | Facility: CLINIC | Age: 38
End: 2023-08-04
Payer: MEDICAID

## 2023-08-04 DIAGNOSIS — F90.0 ATTENTION DEFICIT HYPERACTIVITY DISORDER (ADHD), PREDOMINANTLY INATTENTIVE TYPE: ICD-10-CM

## 2023-08-04 RX ORDER — DEXTROAMPHETAMINE SACCHARATE, AMPHETAMINE ASPARTATE MONOHYDRATE, DEXTROAMPHETAMINE SULFATE AND AMPHETAMINE SULFATE 5; 5; 5; 5 MG/1; MG/1; MG/1; MG/1
20 CAPSULE, EXTENDED RELEASE ORAL EVERY MORNING
Qty: 30 CAPSULE | Refills: 0 | Status: SHIPPED | OUTPATIENT
Start: 2023-08-04 | End: 2023-08-27 | Stop reason: SDUPTHER

## 2023-08-04 NOTE — TELEPHONE ENCOUNTER
No care due was identified.  [facility] Embedded Care Due Messages. Reference number: [id number].   8/04/2023 9:13:09 AM CDT

## 2023-08-27 DIAGNOSIS — F90.0 ATTENTION DEFICIT HYPERACTIVITY DISORDER (ADHD), PREDOMINANTLY INATTENTIVE TYPE: ICD-10-CM

## 2023-08-27 NOTE — TELEPHONE ENCOUNTER
No care due was identified.  [facility] Embedded Care Due Messages. Reference number: [id number].   8/27/2023 1:29:44 PM CDT

## 2023-08-28 RX ORDER — GABAPENTIN 400 MG/1
400 CAPSULE ORAL 3 TIMES DAILY
Qty: 90 CAPSULE | Refills: 0 | Status: SHIPPED | OUTPATIENT
Start: 2023-08-28 | End: 2023-09-21 | Stop reason: SDUPTHER

## 2023-08-28 RX ORDER — DEXTROAMPHETAMINE SACCHARATE, AMPHETAMINE ASPARTATE MONOHYDRATE, DEXTROAMPHETAMINE SULFATE AND AMPHETAMINE SULFATE 5; 5; 5; 5 MG/1; MG/1; MG/1; MG/1
20 CAPSULE, EXTENDED RELEASE ORAL EVERY MORNING
Qty: 30 CAPSULE | Refills: 0 | Status: SHIPPED | OUTPATIENT
Start: 2023-08-28 | End: 2023-10-03 | Stop reason: SDUPTHER

## 2023-09-03 ENCOUNTER — PATIENT MESSAGE (OUTPATIENT)
Dept: PRIMARY CARE CLINIC | Facility: CLINIC | Age: 38
End: 2023-09-03
Payer: MEDICAID

## 2023-09-04 ENCOUNTER — PATIENT MESSAGE (OUTPATIENT)
Dept: OBSTETRICS AND GYNECOLOGY | Facility: CLINIC | Age: 38
End: 2023-09-04
Payer: MEDICAID

## 2023-09-21 RX ORDER — GABAPENTIN 400 MG/1
400 CAPSULE ORAL 3 TIMES DAILY
Qty: 90 CAPSULE | Refills: 2 | Status: SHIPPED | OUTPATIENT
Start: 2023-09-21 | End: 2023-12-19 | Stop reason: SDUPTHER

## 2023-09-21 NOTE — TELEPHONE ENCOUNTER
No care due was identified.  [facility] Embedded Care Due Messages. Reference number: [id number].   9/21/2023 9:19:18 AM CDT

## 2023-09-28 ENCOUNTER — PATIENT MESSAGE (OUTPATIENT)
Dept: PRIMARY CARE CLINIC | Facility: CLINIC | Age: 38
End: 2023-09-28
Payer: MEDICAID

## 2023-09-28 DIAGNOSIS — Z71.3 WEIGHT LOSS COUNSELING, ENCOUNTER FOR: Primary | ICD-10-CM

## 2023-10-03 DIAGNOSIS — F90.0 ATTENTION DEFICIT HYPERACTIVITY DISORDER (ADHD), PREDOMINANTLY INATTENTIVE TYPE: ICD-10-CM

## 2023-10-03 NOTE — TELEPHONE ENCOUNTER
No care due was identified.  Health [facility] Embedded Care Due Messages. Reference number: [id number].   10/03/2023 3:24:07 PM CDT

## 2023-10-04 RX ORDER — DEXTROAMPHETAMINE SACCHARATE, AMPHETAMINE ASPARTATE MONOHYDRATE, DEXTROAMPHETAMINE SULFATE AND AMPHETAMINE SULFATE 5; 5; 5; 5 MG/1; MG/1; MG/1; MG/1
20 CAPSULE, EXTENDED RELEASE ORAL EVERY MORNING
Qty: 30 CAPSULE | Refills: 0 | Status: SHIPPED | OUTPATIENT
Start: 2023-10-04 | End: 2023-11-01 | Stop reason: SDUPTHER

## 2023-10-11 RX ORDER — ESCITALOPRAM OXALATE 20 MG/1
TABLET ORAL
Qty: 90 TABLET | Refills: 3 | Status: SHIPPED | OUTPATIENT
Start: 2023-10-11

## 2023-10-11 NOTE — TELEPHONE ENCOUNTER
Refill Decision Note   Can Wright  is requesting a refill authorization.  Brief Assessment and Rationale for Refill:  Approve     Medication Therapy Plan:         Comments:     Note composed:4:48 AM 10/11/2023

## 2023-10-11 NOTE — TELEPHONE ENCOUNTER
No care due was identified.  [facility] Embedded Care Due Messages. Reference number: [id number].   10/11/2023 12:14:18 AM CDT

## 2023-10-30 PROBLEM — Z00.00 ROUTINE GENERAL MEDICAL EXAMINATION AT A HEALTH CARE FACILITY: Status: RESOLVED | Noted: 2023-07-25 | Resolved: 2023-10-30

## 2023-11-01 DIAGNOSIS — F90.0 ATTENTION DEFICIT HYPERACTIVITY DISORDER (ADHD), PREDOMINANTLY INATTENTIVE TYPE: ICD-10-CM

## 2023-11-01 RX ORDER — DEXTROAMPHETAMINE SACCHARATE, AMPHETAMINE ASPARTATE MONOHYDRATE, DEXTROAMPHETAMINE SULFATE AND AMPHETAMINE SULFATE 5; 5; 5; 5 MG/1; MG/1; MG/1; MG/1
20 CAPSULE, EXTENDED RELEASE ORAL EVERY MORNING
Qty: 30 CAPSULE | Refills: 0 | Status: SHIPPED | OUTPATIENT
Start: 2023-11-01 | End: 2023-11-27 | Stop reason: SDUPTHER

## 2023-11-01 NOTE — TELEPHONE ENCOUNTER
No care due was identified.  [facility] Embedded Care Due Messages. Reference number: [id number].   11/01/2023 8:41:03 AM CDT

## 2023-11-27 DIAGNOSIS — F90.0 ATTENTION DEFICIT HYPERACTIVITY DISORDER (ADHD), PREDOMINANTLY INATTENTIVE TYPE: ICD-10-CM

## 2023-11-27 NOTE — TELEPHONE ENCOUNTER
No care due was identified.  Health [facility] Embedded Care Due Messages. Reference number: [id number].   11/27/2023 11:36:22 AM CST

## 2023-11-29 RX ORDER — DEXTROAMPHETAMINE SACCHARATE, AMPHETAMINE ASPARTATE MONOHYDRATE, DEXTROAMPHETAMINE SULFATE AND AMPHETAMINE SULFATE 5; 5; 5; 5 MG/1; MG/1; MG/1; MG/1
20 CAPSULE, EXTENDED RELEASE ORAL EVERY MORNING
Qty: 30 CAPSULE | Refills: 0 | Status: SHIPPED | OUTPATIENT
Start: 2023-11-29 | End: 2024-01-01 | Stop reason: SDUPTHER

## 2023-12-19 NOTE — TELEPHONE ENCOUNTER
No care due was identified.  [facility] Embedded Care Due Messages. Reference number: [id number].   12/19/2023 3:19:32 PM CST

## 2023-12-20 RX ORDER — GABAPENTIN 400 MG/1
400 CAPSULE ORAL 3 TIMES DAILY
Qty: 90 CAPSULE | Refills: 0 | Status: SHIPPED | OUTPATIENT
Start: 2023-12-20 | End: 2024-01-14 | Stop reason: SDUPTHER

## 2024-01-01 DIAGNOSIS — F90.0 ATTENTION DEFICIT HYPERACTIVITY DISORDER (ADHD), PREDOMINANTLY INATTENTIVE TYPE: ICD-10-CM

## 2024-01-01 NOTE — TELEPHONE ENCOUNTER
No care due was identified.  [facility] Embedded Care Due Messages. Reference number: [id number].   1/01/2024 5:12:26 PM CST

## 2024-01-03 RX ORDER — DEXTROAMPHETAMINE SACCHARATE, AMPHETAMINE ASPARTATE MONOHYDRATE, DEXTROAMPHETAMINE SULFATE AND AMPHETAMINE SULFATE 5; 5; 5; 5 MG/1; MG/1; MG/1; MG/1
20 CAPSULE, EXTENDED RELEASE ORAL EVERY MORNING
Qty: 30 CAPSULE | Refills: 0 | Status: SHIPPED | OUTPATIENT
Start: 2024-01-03 | End: 2024-02-01 | Stop reason: SDUPTHER

## 2024-01-15 NOTE — TELEPHONE ENCOUNTER
No care due was identified.  [facility] Embedded Care Due Messages. Reference number: [id number].   1/14/2024 6:47:17 PM CST

## 2024-01-18 RX ORDER — GABAPENTIN 400 MG/1
400 CAPSULE ORAL 3 TIMES DAILY
Qty: 270 CAPSULE | Refills: 1 | Status: SHIPPED | OUTPATIENT
Start: 2024-01-18 | End: 2024-03-16 | Stop reason: SDUPTHER

## 2024-01-18 RX ORDER — GABAPENTIN 400 MG/1
400 CAPSULE ORAL 3 TIMES DAILY
Qty: 90 CAPSULE | Refills: 0 | OUTPATIENT
Start: 2024-01-18 | End: 2024-02-17

## 2024-01-18 RX ORDER — GABAPENTIN 400 MG/1
400 CAPSULE ORAL 3 TIMES DAILY
Qty: 90 CAPSULE | Refills: 1 | Status: SHIPPED | OUTPATIENT
Start: 2024-01-18 | End: 2024-01-18

## 2024-01-18 NOTE — TELEPHONE ENCOUNTER
No care due was identified.  [facility] Embedded Care Due Messages. Reference number: [id number].   1/18/2024 7:21:03 AM CST

## 2024-02-01 ENCOUNTER — PATIENT MESSAGE (OUTPATIENT)
Dept: PRIMARY CARE CLINIC | Facility: CLINIC | Age: 39
End: 2024-02-01
Payer: MEDICAID

## 2024-02-01 DIAGNOSIS — F90.0 ATTENTION DEFICIT HYPERACTIVITY DISORDER (ADHD), PREDOMINANTLY INATTENTIVE TYPE: ICD-10-CM

## 2024-02-01 RX ORDER — DEXTROAMPHETAMINE SACCHARATE, AMPHETAMINE ASPARTATE MONOHYDRATE, DEXTROAMPHETAMINE SULFATE AND AMPHETAMINE SULFATE 5; 5; 5; 5 MG/1; MG/1; MG/1; MG/1
20 CAPSULE, EXTENDED RELEASE ORAL EVERY MORNING
Qty: 30 CAPSULE | Refills: 0 | OUTPATIENT
Start: 2024-02-01

## 2024-02-01 RX ORDER — DEXTROAMPHETAMINE SACCHARATE, AMPHETAMINE ASPARTATE MONOHYDRATE, DEXTROAMPHETAMINE SULFATE AND AMPHETAMINE SULFATE 5; 5; 5; 5 MG/1; MG/1; MG/1; MG/1
20 CAPSULE, EXTENDED RELEASE ORAL EVERY MORNING
Qty: 30 CAPSULE | Refills: 0 | Status: SHIPPED | OUTPATIENT
Start: 2024-02-01 | End: 2024-03-19 | Stop reason: SDUPTHER

## 2024-02-01 NOTE — TELEPHONE ENCOUNTER
No care due was identified.  Health [facility] Embedded Care Due Messages. Reference number: [id number].   2/01/2024 3:37:44 PM CST

## 2024-02-03 ENCOUNTER — PATIENT MESSAGE (OUTPATIENT)
Dept: OBSTETRICS AND GYNECOLOGY | Facility: CLINIC | Age: 39
End: 2024-02-03
Payer: MEDICAID

## 2024-02-05 RX ORDER — NORETHINDRONE ACETATE AND ETHINYL ESTRADIOL 1MG-20(21)
1 KIT ORAL DAILY
Qty: 90 TABLET | Refills: 0 | Status: SHIPPED | OUTPATIENT
Start: 2024-02-05 | End: 2024-04-05

## 2024-02-23 ENCOUNTER — PATIENT MESSAGE (OUTPATIENT)
Dept: PRIMARY CARE CLINIC | Facility: CLINIC | Age: 39
End: 2024-02-23
Payer: MEDICAID

## 2024-02-23 DIAGNOSIS — F90.2 ATTENTION DEFICIT HYPERACTIVITY DISORDER (ADHD), COMBINED TYPE: Primary | ICD-10-CM

## 2024-03-16 NOTE — TELEPHONE ENCOUNTER
No care due was identified.  Health [facility] Embedded Care Due Messages. Reference number: [id number].   3/16/2024 11:07:35 AM CDT

## 2024-03-19 ENCOUNTER — PATIENT MESSAGE (OUTPATIENT)
Dept: PRIMARY CARE CLINIC | Facility: CLINIC | Age: 39
End: 2024-03-19
Payer: MEDICAID

## 2024-03-19 DIAGNOSIS — F90.0 ATTENTION DEFICIT HYPERACTIVITY DISORDER (ADHD), PREDOMINANTLY INATTENTIVE TYPE: ICD-10-CM

## 2024-03-19 RX ORDER — DEXTROAMPHETAMINE SACCHARATE, AMPHETAMINE ASPARTATE MONOHYDRATE, DEXTROAMPHETAMINE SULFATE AND AMPHETAMINE SULFATE 5; 5; 5; 5 MG/1; MG/1; MG/1; MG/1
20 CAPSULE, EXTENDED RELEASE ORAL EVERY MORNING
Qty: 30 CAPSULE | Refills: 0 | Status: SHIPPED | OUTPATIENT
Start: 2024-03-19 | End: 2024-04-30 | Stop reason: SDUPTHER

## 2024-03-19 RX ORDER — GABAPENTIN 400 MG/1
400 CAPSULE ORAL 3 TIMES DAILY
Qty: 270 CAPSULE | Refills: 0 | Status: SHIPPED | OUTPATIENT
Start: 2024-03-19 | End: 2024-06-17

## 2024-03-19 NOTE — TELEPHONE ENCOUNTER
No care due was identified.  [facility] Embedded Care Due Messages. Reference number: [id number].   3/19/2024 8:46:12 AM CDT   Simple: Patient demonstrates quick and easy understanding/Verbalized Understanding

## 2024-04-04 RX ORDER — NORTRIPTYLINE HYDROCHLORIDE 50 MG/1
50 CAPSULE ORAL NIGHTLY
Qty: 90 CAPSULE | Refills: 0 | Status: SHIPPED | OUTPATIENT
Start: 2024-04-04

## 2024-04-04 NOTE — TELEPHONE ENCOUNTER
No care due was identified.  Health [facility] Embedded Care Due Messages. Reference number: [id number].   4/04/2024 9:10:32 AM CDT

## 2024-04-05 ENCOUNTER — TELEPHONE (OUTPATIENT)
Dept: OBSTETRICS AND GYNECOLOGY | Facility: CLINIC | Age: 39
End: 2024-04-05
Payer: MEDICAID

## 2024-04-05 RX ORDER — NORETHINDRONE ACETATE AND ETHINYL ESTRADIOL 1MG-20(21)
1 KIT ORAL
Qty: 84 TABLET | Refills: 0 | Status: SHIPPED | OUTPATIENT
Start: 2024-04-05

## 2024-04-05 RX ORDER — NORETHINDRONE ACETATE AND ETHINYL ESTRADIOL 1MG-20(21)
1 KIT ORAL DAILY
Qty: 90 TABLET | Refills: 0 | OUTPATIENT
Start: 2024-04-05 | End: 2025-04-05

## 2024-04-05 NOTE — TELEPHONE ENCOUNTER
Attempted to call pt to schedule wwe no answer LVM     ----- Message from Julie R. Jeansonne, MD sent at 4/5/2024 10:48 AM CDT -----  Regarding: apt  Needs annual exam. I am sending in some BC refills for now.

## 2024-04-05 NOTE — TELEPHONE ENCOUNTER
Refill Routing Note   Medication(s) are not appropriate for processing by [facility] for the following reason(s):        New or recently adjusted medication  Drug-disease interaction    [facility] action(s):  Defer        Medication Therapy Plan: Patient is  an active smoker      Appointments  past 12m or future 3m with PCP    Date Provider   Last Visit   4/25/2023 Jeansonne, Julie R., MD   Next Visit   Visit date not found Jeansonne, Julie R., MD   ED visits in past 90 days: 0        Note composed:8:25 AM 04/05/2024

## 2024-04-05 NOTE — TELEPHONE ENCOUNTER
Refill Decision Note  Quick DC. Duplicate Request-  med pended in previous encounter, awaiting assessment       Can Kyle  is requesting a refill authorization.  Brief Assessment and Rationale for Refill:  Quick Discontinue     Medication Therapy Plan:         Comments:     Note composed:8:26 AM 04/05/2024

## 2024-04-25 ENCOUNTER — PATIENT MESSAGE (OUTPATIENT)
Dept: PRIMARY CARE CLINIC | Facility: CLINIC | Age: 39
End: 2024-04-25
Payer: MEDICAID

## 2024-04-25 DIAGNOSIS — F90.0 ATTENTION DEFICIT HYPERACTIVITY DISORDER (ADHD), PREDOMINANTLY INATTENTIVE TYPE: ICD-10-CM

## 2024-04-25 NOTE — TELEPHONE ENCOUNTER
LOV 7/25/23    On 3/19/24 pt asked you to fill her adderall one more time until she can find a psychiatrist. You refilled her medication. Now she is asking that you refill her medication one more time again because she have to move her psych appt back because of insurance reasons.

## 2024-04-30 RX ORDER — DEXTROAMPHETAMINE SACCHARATE, AMPHETAMINE ASPARTATE MONOHYDRATE, DEXTROAMPHETAMINE SULFATE AND AMPHETAMINE SULFATE 5; 5; 5; 5 MG/1; MG/1; MG/1; MG/1
20 CAPSULE, EXTENDED RELEASE ORAL EVERY MORNING
Qty: 30 CAPSULE | Refills: 0 | Status: SHIPPED | OUTPATIENT
Start: 2024-04-30

## 2024-06-23 RX ORDER — NORTRIPTYLINE HYDROCHLORIDE 50 MG/1
CAPSULE ORAL
Qty: 90 CAPSULE | Refills: 0 | Status: SHIPPED | OUTPATIENT
Start: 2024-06-23

## 2024-06-23 NOTE — TELEPHONE ENCOUNTER
No care due was identified.  [facility] Embedded Care Due Messages. Reference number: [id number].   6/23/2024 6:17:28 PM CDT

## 2024-06-23 NOTE — TELEPHONE ENCOUNTER
Refill Decision Note   Can Ugaldever  is requesting a refill authorization.  Brief Assessment and Rationale for Refill:  Approve     Medication Therapy Plan:         Comments:     Note composed:6:29 PM 06/23/2024

## 2024-09-02 NOTE — TELEPHONE ENCOUNTER
Care Due:                  Date            Visit Type   Department     Provider  --------------------------------------------------------------------------------                                EP -                              PRIMARY      [facility] PRIMARY  Last Visit: 07-      CARE ([facility])   CARE           Bertha Witt  Next Visit: None Scheduled  None         None Found                                                            Last  Test          Frequency    Reason                     Performed    Due Date  --------------------------------------------------------------------------------    Office Visit  15 months..  EScitalopram,              07-   10-                             nortriptyline............    [employer] Embedded Care Due Messages. Reference number: [id number].   9/02/2024 11:11:12 AM CDT

## 2024-09-03 NOTE — TELEPHONE ENCOUNTER
LOV 7/25/23    Pt read message sent on 4/4/24 to schedule annual and fasting lab appt. She has not scheduled yet.

## 2024-09-04 RX ORDER — NORTRIPTYLINE HYDROCHLORIDE 50 MG/1
50 CAPSULE ORAL DAILY
Qty: 90 CAPSULE | Refills: 0 | Status: SHIPPED | OUTPATIENT
Start: 2024-09-04

## 2024-11-25 NOTE — TELEPHONE ENCOUNTER
Care Due:                  Date            Visit Type   Department     Provider  --------------------------------------------------------------------------------                                EP -                              PRIMARY      [facility] PRIMARY  Last Visit: 07-      CARE ([facility])   CARE           Bertha Witt  Next Visit: None Scheduled  None         None Found                                                            Last  Test          Frequency    Reason                     Performed    Due Date  --------------------------------------------------------------------------------    Office Visit  15 months..  EScitalopram,              07-   10-                             nortriptyline............    [employer] Embedded Care Due Messages. Reference number: [id number].   11/25/2024 5:35:35 AM CST

## 2024-11-26 ENCOUNTER — PATIENT MESSAGE (OUTPATIENT)
Dept: PRIMARY CARE CLINIC | Facility: CLINIC | Age: 39
End: 2024-11-26
Payer: MEDICAID

## 2024-11-26 DIAGNOSIS — R73.09 ELEVATED GLUCOSE: ICD-10-CM

## 2024-11-26 DIAGNOSIS — Z00.00 ANNUAL PHYSICAL EXAM: Primary | ICD-10-CM

## 2024-11-26 RX ORDER — NORTRIPTYLINE HYDROCHLORIDE 50 MG/1
CAPSULE ORAL
Qty: 90 CAPSULE | Refills: 0 | Status: SHIPPED | OUTPATIENT
Start: 2024-11-26

## 2024-11-26 RX ORDER — GABAPENTIN 400 MG/1
400 CAPSULE ORAL 3 TIMES DAILY
Qty: 270 CAPSULE | Refills: 0 | Status: SHIPPED | OUTPATIENT
Start: 2024-11-26 | End: 2025-02-24

## 2024-11-26 RX ORDER — ESCITALOPRAM OXALATE 20 MG/1
20 TABLET ORAL DAILY
Qty: 90 TABLET | Refills: 0 | Status: SHIPPED | OUTPATIENT
Start: 2024-11-26

## 2024-11-26 NOTE — TELEPHONE ENCOUNTER
LOV: 07/2023, Annual scheduled for 01/07/2025 10:20am bc her insurance doesn't start til Jan 2025. Pt asks if she can have a temp refill but if not it's okay, she'd rather have lexapro

## 2024-11-26 NOTE — TELEPHONE ENCOUNTER
LOV: 07/2023, Annual scheduled for 01/07/2025 10:20am bc her insurance doesn't start til Jan 2025. Pt asks if she can have a temp refill on Lexapro 20 mg for the holidays since its an antidepressant & needs that.

## 2024-11-26 NOTE — TELEPHONE ENCOUNTER
No care due was identified.  Health [facility] Embedded Care Due Messages. Reference number: [id number].   11/26/2024 9:20:02 AM CST

## 2024-11-26 NOTE — TELEPHONE ENCOUNTER
No care due was identified.  [facility] Embedded Care Due Messages. Reference number: [id number].   11/26/2024 9:19:32 AM CST

## 2025-02-15 NOTE — TELEPHONE ENCOUNTER
Care Due:                  Date            Visit Type   Department     Provider  --------------------------------------------------------------------------------                                EP -                              PRIMARY      [facility] PRIMARY  Last Visit: 07-      CARE ([facility])   CARE           Bertha Witt  Next Visit: None Scheduled  None         None Found                                                            Last  Test          Frequency    Reason                     Performed    Due Date  --------------------------------------------------------------------------------    Office Visit  15 months..  EScitalopram,              07-   10-                             nortriptyline............    [employer] Embedded Care Due Messages. Reference number: [id number].   2/15/2025 3:51:45 PM CST

## 2025-02-17 NOTE — TELEPHONE ENCOUNTER
No care due was identified.  Health [facility] Embedded Care Due Messages. Reference number: [id number].   2/17/2025 4:22:59 PM CST

## 2025-02-17 NOTE — TELEPHONE ENCOUNTER
LOV 07/25/23    I called and sw pt, she canceled appt for 03/18/25 and is keeping the one for 03/13/25. Fasting labs scheduled for 03/07/25.   estephania no info

## 2025-02-18 RX ORDER — NORTRIPTYLINE HYDROCHLORIDE 50 MG/1
CAPSULE ORAL
Qty: 90 CAPSULE | Refills: 0 | OUTPATIENT
Start: 2025-02-18

## 2025-02-18 RX ORDER — NORTRIPTYLINE HYDROCHLORIDE 50 MG/1
CAPSULE ORAL
Qty: 90 CAPSULE | Refills: 0 | Status: SHIPPED | OUTPATIENT
Start: 2025-02-18

## 2025-02-19 DIAGNOSIS — Z12.31 OTHER SCREENING MAMMOGRAM: ICD-10-CM

## 2025-03-10 ENCOUNTER — LAB VISIT (OUTPATIENT)
Dept: LAB | Facility: HOSPITAL | Age: 40
End: 2025-03-10
Attending: FAMILY MEDICINE
Payer: COMMERCIAL

## 2025-03-10 DIAGNOSIS — R73.09 ELEVATED GLUCOSE: ICD-10-CM

## 2025-03-10 DIAGNOSIS — Z00.00 ANNUAL PHYSICAL EXAM: ICD-10-CM

## 2025-03-10 LAB
ALBUMIN SERPL BCP-MCNC: 3.6 G/DL (ref 3.5–5.2)
ALP SERPL-CCNC: 111 U/L (ref 40–150)
ALT SERPL W/O P-5'-P-CCNC: 14 U/L (ref 10–44)
ANION GAP SERPL CALC-SCNC: 11 MMOL/L (ref 8–16)
AST SERPL-CCNC: 26 U/L (ref 10–40)
BILIRUB SERPL-MCNC: 0.6 MG/DL (ref 0.1–1)
BUN SERPL-MCNC: 13 MG/DL (ref 6–20)
CALCIUM SERPL-MCNC: 8.9 MG/DL (ref 8.7–10.5)
CHLORIDE SERPL-SCNC: 101 MMOL/L (ref 95–110)
CHOLEST SERPL-MCNC: 243 MG/DL (ref 120–199)
CHOLEST/HDLC SERPL: 4.8 {RATIO} (ref 2–5)
CO2 SERPL-SCNC: 25 MMOL/L (ref 23–29)
CREAT SERPL-MCNC: 0.8 MG/DL (ref 0.5–1.4)
ERYTHROCYTE [DISTWIDTH] IN BLOOD BY AUTOMATED COUNT: 13.9 % (ref 11.5–14.5)
EST. GFR  (NO RACE VARIABLE): >60 ML/MIN/1.73 M^2
ESTIMATED AVG GLUCOSE: 100 MG/DL (ref 68–131)
GLUCOSE SERPL-MCNC: 78 MG/DL (ref 70–110)
HBA1C MFR BLD: 5.1 % (ref 4–5.6)
HCT VFR BLD AUTO: 45.3 % (ref 37–48.5)
HDLC SERPL-MCNC: 51 MG/DL (ref 40–75)
HDLC SERPL: 21 % (ref 20–50)
HGB BLD-MCNC: 14.4 G/DL (ref 12–16)
LDLC SERPL CALC-MCNC: 117 MG/DL (ref 63–159)
MCH RBC QN AUTO: 28.1 PG (ref 27–31)
MCHC RBC AUTO-ENTMCNC: 31.8 G/DL (ref 32–36)
MCV RBC AUTO: 89 FL (ref 82–98)
NONHDLC SERPL-MCNC: 192 MG/DL
PLATELET # BLD AUTO: 417 K/UL (ref 150–450)
PMV BLD AUTO: 9.6 FL (ref 9.2–12.9)
POTASSIUM SERPL-SCNC: 3.8 MMOL/L (ref 3.5–5.1)
PROT SERPL-MCNC: 7.4 G/DL (ref 6–8.4)
RBC # BLD AUTO: 5.12 M/UL (ref 4–5.4)
SODIUM SERPL-SCNC: 137 MMOL/L (ref 136–145)
TRIGL SERPL-MCNC: 375 MG/DL (ref 30–150)
WBC # BLD AUTO: 12.76 K/UL (ref 3.9–12.7)

## 2025-03-10 PROCEDURE — 85027 COMPLETE CBC AUTOMATED: CPT | Performed by: FAMILY MEDICINE

## 2025-03-10 PROCEDURE — 36415 COLL VENOUS BLD VENIPUNCTURE: CPT | Performed by: FAMILY MEDICINE

## 2025-03-10 PROCEDURE — 80061 LIPID PANEL: CPT | Performed by: FAMILY MEDICINE

## 2025-03-10 PROCEDURE — 83036 HEMOGLOBIN GLYCOSYLATED A1C: CPT | Performed by: FAMILY MEDICINE

## 2025-03-10 PROCEDURE — 80053 COMPREHEN METABOLIC PANEL: CPT | Performed by: FAMILY MEDICINE

## 2025-03-13 ENCOUNTER — OFFICE VISIT (OUTPATIENT)
Dept: PRIMARY CARE CLINIC | Facility: CLINIC | Age: 40
End: 2025-03-13
Payer: COMMERCIAL

## 2025-03-13 VITALS
TEMPERATURE: 98 F | OXYGEN SATURATION: 97 % | WEIGHT: 204.13 LBS | BODY MASS INDEX: 38.54 KG/M2 | SYSTOLIC BLOOD PRESSURE: 132 MMHG | HEART RATE: 82 BPM | HEIGHT: 61 IN | DIASTOLIC BLOOD PRESSURE: 88 MMHG

## 2025-03-13 DIAGNOSIS — Z00.00 ROUTINE GENERAL MEDICAL EXAMINATION AT A HEALTH CARE FACILITY: Primary | ICD-10-CM

## 2025-03-13 DIAGNOSIS — G56.03 CARPAL TUNNEL SYNDROME ON BOTH SIDES: ICD-10-CM

## 2025-03-13 DIAGNOSIS — Z86.32 HISTORY OF GESTATIONAL DIABETES: ICD-10-CM

## 2025-03-13 DIAGNOSIS — F41.9 ANXIETY: ICD-10-CM

## 2025-03-13 DIAGNOSIS — E78.1 HYPERTRIGLYCERIDEMIA: ICD-10-CM

## 2025-03-13 DIAGNOSIS — G95.0 SYRINGOMYELIA: ICD-10-CM

## 2025-03-13 PROCEDURE — 3075F SYST BP GE 130 - 139MM HG: CPT | Mod: CPTII,S$GLB,, | Performed by: FAMILY MEDICINE

## 2025-03-13 PROCEDURE — 3044F HG A1C LEVEL LT 7.0%: CPT | Mod: CPTII,S$GLB,, | Performed by: FAMILY MEDICINE

## 2025-03-13 PROCEDURE — 3008F BODY MASS INDEX DOCD: CPT | Mod: CPTII,S$GLB,, | Performed by: FAMILY MEDICINE

## 2025-03-13 PROCEDURE — 1159F MED LIST DOCD IN RCRD: CPT | Mod: CPTII,S$GLB,, | Performed by: FAMILY MEDICINE

## 2025-03-13 PROCEDURE — 3079F DIAST BP 80-89 MM HG: CPT | Mod: CPTII,S$GLB,, | Performed by: FAMILY MEDICINE

## 2025-03-13 PROCEDURE — 99999 PR PBB SHADOW E&M-EST. PATIENT-LVL III: CPT | Mod: PBBFAC,,, | Performed by: FAMILY MEDICINE

## 2025-03-13 PROCEDURE — 1160F RVW MEDS BY RX/DR IN RCRD: CPT | Mod: CPTII,S$GLB,, | Performed by: FAMILY MEDICINE

## 2025-03-13 PROCEDURE — 99396 PREV VISIT EST AGE 40-64: CPT | Mod: S$GLB,,, | Performed by: FAMILY MEDICINE

## 2025-03-13 RX ORDER — NORTRIPTYLINE HYDROCHLORIDE 10 MG/1
30 CAPSULE ORAL NIGHTLY
Qty: 270 CAPSULE | Refills: 2 | Status: SHIPPED | OUTPATIENT
Start: 2025-03-13

## 2025-03-13 RX ORDER — ESCITALOPRAM OXALATE 20 MG/1
20 TABLET ORAL DAILY
Qty: 90 TABLET | Refills: 2 | Status: SHIPPED | OUTPATIENT
Start: 2025-03-13

## 2025-03-13 RX ORDER — GABAPENTIN 300 MG/1
300 CAPSULE ORAL 2 TIMES DAILY
Qty: 180 CAPSULE | Refills: 2 | Status: SHIPPED | OUTPATIENT
Start: 2025-03-13

## 2025-03-13 NOTE — PROGRESS NOTES
Assessment:     1. Routine general medical examination at a health care facility    2. Hypertriglyceridemia    3. Carpal tunnel syndrome on both sides    4. History of gestational diabetes    5. Syringomyelia    6. Anxiety      Plan:     Routine general medical examination at a health care facility  We discussed Fertility Awareness & discussed tracking her ovulation & body clues .     [employer] - Women's Health Tab- go to      Try the FEM[facility] [place]    ===========      Follow with GYN for female health & cancer prevention  Move more, High fiber, good fat (avocado, olive oil, nuts) foods  Eat more food grown from the earth (picked from trees or out of the ground)  Colon cancer screening at 45 yo  Follow up yearly with LABS ONE WEEK PRIOR so we can discuss at your visit      Hypertriglyceridemia  This often leads to Diabetes    I highly recommend my Lifestyle Medicine colleague    Dr Seth Le,  Lifestyle Medicine Board Certified,   Pulmonologist & Critical Care specialist  [email or web address]  [phone]    =====================    Recommended by Dr Le  Un Do It - Dr Jan Le [facility] on [employer]    ====    PREVIOUS MEDS: statin - intolerable myalgia, wellchol no side effects    Move more, sit less -  Try 1 minute STARR [employer] PUFF (walk/dance/steps/bike/chair exercise) DAILY for the next month  High fiber 30 grams a day, good fat (avocado, olive oil, nuts) foods  Eat less processed foods (if you can't read the ingredients or if you don't have that ingredient in your pantry, it's processed)  Try to eat 5 fresh COLORS a day    ================     Low carb, Mediterranean style diet:      A sensible diet that limits the intake of sugars, saturated (bad) fats and trans fats while increasing the intake of unsaturated (good) fats and plant proteins is the basis of the current dietary recommendations.      We now recommend drastically reducing  "the intake of sugar and sugary drinks. There is less emphasis on excluding all fat and more emphasis on the types of different fats. We continue to recommend eating more vegetables.     Cholesterol in our food is generally present in relatively small amounts. New dietary guidelines are less obsessed with the amount of cholesterol. However please do not confuse this with the role of cholesterol in our blood and arteries. The liver converts certain foods into cholesterol.  It is this cholesterol and other fats that clog up our arteries.       Most foods that are high in cholesterol are also high in saturated fat. But there is much more saturated fat than cholesterol in these foods. The saturated fat content matters more than cholesterol. On the other hand, there are a handful of foods that are high in cholesterol but do not contain much saturated fat: eggs, shrimp, crab legs and crawfish are OK to eat in small quantities as long as you do not deep grande them. So a few (2-3) eggs a week are fine (both the white and the yolk), but you can eat as many egg whites as you want. Also, some of these same foods irritate the inner lining of blood vessels by inducing inflammation (see below).  This occurs even if your blood cholesterol levels are "normal". So you should avoid foods that are high in saturated fat and sugar even if your blood cholesterol levels are normal.       Saturated fat is the bad fat - you should limit your intake of this. Deep fried foods, meats and other animal fats are high in saturated fat. Cookies, donuts and most dessert and cakes are usually high in both saturated fat and sugar.        Unsaturated fat is the good fat. It contains the same number of calories as saturated fat but these fats do not get deposited in our arteries. The Mediterranean style diet encourages the intake of unsaturated fat - olive oil, avocado and unsalted nuts. So instead of baking a piece of fish, consider pan-frying it using " "olive oil.      You should eat a few servings of vegetables (and fruit as long as you are not diabetic) everyday. Substitute some plant proteins in place of meat: beans, lentils, quinoa and oatmeal. They are lean proteins.      Vegetables (particularly green vegetables such as broccoli, kale and spinach) have anti-inflammatory properties. Some fruits (certain berries) have anti-oxidant properties. However I think foods that reduce vascular inflammation are much  more important than the anti-oxidant effect of fruits. Eat more of the vegetables with anti-oxidant properties.      I recommend using plant based butter  spreads that come in a tub. Brands such as [employer] make olive oil butter or avocado butter. Do not use margarine that comes in a stick.        Trans fats should definitely be avoided. Most foods that are labelled as containing 0 gms of trans fat may still contain several hundred milligrams of trans fat: creamer, margarine, dough, deep fried foods, ready made frosting, potato, corn and torilla chips, cakes, cookies, pie crusts and crackers containing shortening made with hydrogenated vegetable oil.               Carpal tunnel syndrome on both sides      Neurontin 300 mg BID prn  for carpal tunnel & arthritis    Syringomyelia  Stable, follow w Neuro prn    History of gestational diabetes  She will check sugars periodically again , as this aided in overall well being & better food choices during pregnancy.     Anxiety  Wean off Nortriptyline 50 mg, previously prescribed by Neurologist for pain & depression. She was able to stop during her pregnancy, but then restarted, unsure if she needs it "I'm not in pain".     CONTINUE  Lexapro 20 daily          HPI: Can Wright is a 39 y.o. female with is here today for general exam.     History of Present Illness    CHIEF COMPLAINT:  Patient presents today for follow up and concern about cholesterol.    LABS:  Triglycerides were elevated at 375. Blood " "sugar was normal, without evidence of diabetes.    MEDICATIONS:  She continues Citalopram (Lexapro) 20mg daily. She takes Nortriptyline 50mg at night, originally prescribed by neurosurgeon for pain and depression, which was temporarily discontinued during pregnancy but has since been restarted without noticeable effects. Gabapentin is prescribed 3 times daily for carpal tunnel syndrome and hand arthritis, though she reports variable adherence, typically taking it twice daily. She notes it causes mild irritability.    WEIGHT MANAGEMENT:  She reports weight gain since discontinuing Adderall. She attributes decreased self-care to demands of full-time work and infant care, acknowledging the need to increase physical activity.      ROS:  ROS as indicated in HPI.             Current Outpatient Medications   Medication Instructions    EScitalopram oxalate (LEXAPRO) 20 mg, Oral, Daily    gabapentin (NEURONTIN) 300 mg, Oral, 2 times daily, For hand arthritis & carpal tunnel    nortriptyline (PAMELOR) 30 mg, Oral, Nightly    ondansetron (ZOFRAN-ODT) 4 MG TbDL DISSOLVE 1 TABLET(4 MG) ON THE TONGUE EVERY 6 HOURS AS NEEDED FOR NAUSEA       Lab Results   Component Value Date    HGBA1C 5.1 03/10/2025    HGBA1C 5.0 07/25/2023     No results found for: "MICALBCREAT"  Lab Results   Component Value Date    LDLCALC 117.0 03/10/2025    LDLCALC 145.8 07/25/2023    CHOL 243 (H) 03/10/2025    HDL 51 03/10/2025    TRIG 375 (H) 03/10/2025       Lab Results   Component Value Date     03/10/2025    K 3.8 03/10/2025     03/10/2025    CO2 25 03/10/2025    GLU 78 03/10/2025    BUN 13 03/10/2025    CREATININE 0.8 03/10/2025    CALCIUM 8.9 03/10/2025    PROT 7.4 03/10/2025    ALBUMIN 3.6 03/10/2025    BILITOT 0.6 03/10/2025    ALKPHOS 111 03/10/2025    AST 26 03/10/2025    ALT 14 03/10/2025    ANIONGAP 11 03/10/2025    ESTGFRAFRICA >60.0 12/13/2021    EGFRNONAA >60.0 12/13/2021    WBC 12.76 (H) 03/10/2025    HGB 14.4 03/10/2025    HGB " "13.2 2023    HCT 45.3 03/10/2025    MCV 89 03/10/2025     03/10/2025    TSH 2.430 2021    HEPCAB Non-reactive 10/18/2022       No results found for: "LH", "FSH", "TOTALTESTOST", "PROGESTERONE", "ESTRADIOL", "CHOAFTJW09LB", "YQITRBJQ66", "FERRITIN", "IRON", "TRANSFERRIN", "TIBC", "FESATURATED", "ZINC"      Past Medical History:   Diagnosis Date    ADD (attention deficit disorder)     Allergy     Anxiety     nortriptyline 50 mg nightly, Lexapro 20 q.d., propranolol 10 mg prn    Attention deficit hyperactivity disorder (ADHD), combined type 2014    Carpal tunnel syndrome on both sides     Neurontin 400 mg 3 times a day for carpal tunnel & arthritis. Bilateral injections Dr Talbert, Celebrex expensive    Chiari I malformation     Dysthymic disorder     lexapro    Episodic paroxysmal hemicrania, not intractable 2022    History of gestational diabetes 2025    Hyperlipidemia     Shellfish allergy     epi pen    Syringomyelia     asymptomatic, released from Dr Lyon, steroids worked initially, tx Celebrex, Neurontin, Tyl #3 (helps prn), pamhusseinor, Dr Graica recommended surgery     Past Surgical History:   Procedure Laterality Date     SECTION N/A 2023    Procedure:  SECTION;  Surgeon: Sohan Graf MD;  Location: [facility];  Service: OB/GYN;  Laterality: N/A;    WISDOM TOOTH EXTRACTION       Vitals:    25 0850 25 0910   BP: 132/88    Pulse: (!) 111 82   Temp: 98.4 °F (36.9 °C)    TempSrc: Oral    SpO2: 97%    Weight: 92.6 kg (204 lb 2.3 oz)    Height: 5' 1" (1.549 m)    PainSc: 0-No pain      Wt Readings from Last 5 Encounters:   25 92.6 kg (204 lb 2.3 oz)   23 78 kg (171 lb 15.3 oz)   23 76 kg (167 lb 8.8 oz)   23 88.3 kg (194 lb 10.7 oz)   23 88.3 kg (194 lb 10.7 oz)     Objective:   Physical Exam  Constitutional:       Appearance: She is well-developed.   Eyes:      Pupils: Pupils are equal, round, and reactive to " light.   Cardiovascular:      Rate and Rhythm: Normal rate and regular rhythm.      Heart sounds: Normal heart sounds. No murmur heard.  Pulmonary:      Effort: Pulmonary effort is normal.      Breath sounds: Normal breath sounds. No wheezing.   Abdominal:      General: Bowel sounds are normal. There is no distension.      Palpations: Abdomen is soft. There is no mass.      Tenderness: There is no abdominal tenderness. There is no guarding or rebound.   Musculoskeletal:      Cervical back: Neck supple.   Skin:     General: Skin is warm and dry.   Neurological:      Mental Status: She is alert.   Psychiatric:         Behavior: Behavior normal.

## 2025-03-13 NOTE — ASSESSMENT & PLAN NOTE
"Wean off Nortriptyline 50 mg, previously prescribed by Neurologist for pain & depression. She was able to stop during her pregnancy, but then restarted, unsure if she needs it "I'm not in pain".     CONTINUE  Lexapro 20 daily  "

## 2025-03-13 NOTE — ASSESSMENT & PLAN NOTE
This often leads to Diabetes    I highly recommend my Lifestyle Medicine colleague    Dr Seth Le,  Lifestyle Medicine Board Certified,   Pulmonologist & Critical Care specialist  [email or web address]  [phone]    =====================    Recommended by Dr Le  Un Do It - Dr Jan Grey [facility] on [employer]    ====    PREVIOUS MEDS: statin - intolerable myalgia, wellchol no side effects    Move more, sit less -  Try 1 minute STARR [employer] PUFF (walk/dance/steps/bike/chair exercise) DAILY for the next month  High fiber 30 grams a day, good fat (avocado, olive oil, nuts) foods  Eat less processed foods (if you can't read the ingredients or if you don't have that ingredient in your pantry, it's processed)  Try to eat 5 fresh COLORS a day    ================     Low carb, Mediterranean style diet:      A sensible diet that limits the intake of sugars, saturated (bad) fats and trans fats while increasing the intake of unsaturated (good) fats and plant proteins is the basis of the current dietary recommendations.      We now recommend drastically reducing the intake of sugar and sugary drinks. There is less emphasis on excluding all fat and more emphasis on the types of different fats. We continue to recommend eating more vegetables.     Cholesterol in our food is generally present in relatively small amounts. New dietary guidelines are less obsessed with the amount of cholesterol. However please do not confuse this with the role of cholesterol in our blood and arteries. The liver converts certain foods into cholesterol.  It is this cholesterol and other fats that clog up our arteries.       Most foods that are high in cholesterol are also high in saturated fat. But there is much more saturated fat than cholesterol in these foods. The saturated fat content matters more than cholesterol. On the other hand, there are a handful of foods that are high in cholesterol  "but do not contain much saturated fat: eggs, shrimp, crab legs and crawfish are OK to eat in small quantities as long as you do not deep grande them. So a few (2-3) eggs a week are fine (both the white and the yolk), but you can eat as many egg whites as you want. Also, some of these same foods irritate the inner lining of blood vessels by inducing inflammation (see below).  This occurs even if your blood cholesterol levels are "normal". So you should avoid foods that are high in saturated fat and sugar even if your blood cholesterol levels are normal.       Saturated fat is the bad fat - you should limit your intake of this. Deep fried foods, meats and other animal fats are high in saturated fat. Cookies, donuts and most dessert and cakes are usually high in both saturated fat and sugar.        Unsaturated fat is the good fat. It contains the same number of calories as saturated fat but these fats do not get deposited in our arteries. The Mediterranean style diet encourages the intake of unsaturated fat - olive oil, avocado and unsalted nuts. So instead of baking a piece of fish, consider pan-frying it using olive oil.      You should eat a few servings of vegetables (and fruit as long as you are not diabetic) everyday. Substitute some plant proteins in place of meat: beans, lentils, quinoa and oatmeal. They are lean proteins.      Vegetables (particularly green vegetables such as broccoli, kale and spinach) have anti-inflammatory properties. Some fruits (certain berries) have anti-oxidant properties. However I think foods that reduce vascular inflammation are much  more important than the anti-oxidant effect of fruits. Eat more of the vegetables with anti-oxidant properties.      I recommend using plant based butter  spreads that come in a tub. Brands such as [employer] make olive oil butter or avocado butter. Do not use margarine that comes in a stick.        Trans fats should definitely be avoided. Most foods " that are labelled as containing 0 gms of trans fat may still contain several hundred milligrams of trans fat: creamer, margarine, dough, deep fried foods, ready made frosting, potato, corn and torilla chips, cakes, cookies, pie crusts and crackers containing shortening made with hydrogenated vegetable oil.

## 2025-03-13 NOTE — ASSESSMENT & PLAN NOTE
We discussed Fertility Awareness & discussed tracking her ovulation & body clues .     [employer] - Women's Health Tab- go to  K[address]    Try the FEM[facility] [place]    ===========      Follow with GYN for female health & cancer prevention  Move more, High fiber, good fat (avocado, olive oil, nuts) foods  Eat more food grown from the earth (picked from trees or out of the ground)  Colon cancer screening at 43 yo  Follow up yearly with LABS ONE WEEK PRIOR so we can discuss at your visit

## 2025-03-13 NOTE — ASSESSMENT & PLAN NOTE
She will check sugars periodically again , as this aided in overall well being & better food choices during pregnancy.

## 2025-06-11 ENCOUNTER — PATIENT MESSAGE (OUTPATIENT)
Dept: PRIMARY CARE CLINIC | Facility: CLINIC | Age: 40
End: 2025-06-11
Payer: COMMERCIAL

## 2025-06-11 RX ORDER — NORTRIPTYLINE HYDROCHLORIDE 10 MG/1
30 CAPSULE ORAL NIGHTLY
Qty: 270 CAPSULE | Refills: 2 | Status: CANCELLED | OUTPATIENT
Start: 2025-06-11

## 2025-06-11 NOTE — TELEPHONE ENCOUNTER
Pr requesting to stay on notriptyline 50 MG for now. You previously discussed her getting off. She is requesting a refill.

## 2025-06-11 NOTE — TELEPHONE ENCOUNTER
No care due was identified.  [facility] Embedded Care Due Messages. Reference number: [id number].   6/11/2025 3:45:13 PM CDT

## 2025-06-12 RX ORDER — NORTRIPTYLINE HYDROCHLORIDE 50 MG/1
50 CAPSULE ORAL NIGHTLY
Qty: 90 CAPSULE | Refills: 2 | Status: SHIPPED | OUTPATIENT
Start: 2025-06-12

## (undated) DEVICE — PILLOW FETAL